# Patient Record
Sex: FEMALE | Race: WHITE | NOT HISPANIC OR LATINO | Employment: FULL TIME | URBAN - METROPOLITAN AREA
[De-identification: names, ages, dates, MRNs, and addresses within clinical notes are randomized per-mention and may not be internally consistent; named-entity substitution may affect disease eponyms.]

---

## 2017-01-23 ENCOUNTER — OFFICE VISIT (OUTPATIENT)
Dept: NEUROLOGY | Facility: MEDICAL CENTER | Age: 47
End: 2017-01-23
Payer: COMMERCIAL

## 2017-01-23 ENCOUNTER — TELEPHONE (OUTPATIENT)
Dept: NEUROLOGY | Facility: MEDICAL CENTER | Age: 47
End: 2017-01-23

## 2017-01-23 VITALS
DIASTOLIC BLOOD PRESSURE: 88 MMHG | SYSTOLIC BLOOD PRESSURE: 124 MMHG | TEMPERATURE: 98.2 F | WEIGHT: 207.8 LBS | BODY MASS INDEX: 32.62 KG/M2 | RESPIRATION RATE: 16 BRPM | HEART RATE: 96 BPM | HEIGHT: 67 IN | OXYGEN SATURATION: 97 %

## 2017-01-23 DIAGNOSIS — R56.9 SEIZURE (HCC): ICD-10-CM

## 2017-01-23 PROCEDURE — 99214 OFFICE O/P EST MOD 30 MIN: CPT | Performed by: NURSE PRACTITIONER

## 2017-01-23 RX ORDER — LAMOTRIGINE 200 MG/1
200 TABLET, EXTENDED RELEASE ORAL EVERY MORNING
Qty: 30 TAB | Refills: 5 | Status: SHIPPED | OUTPATIENT
Start: 2017-01-23 | End: 2017-07-25 | Stop reason: SDUPTHER

## 2017-01-23 RX ORDER — LAMOTRIGINE 25 MG/1
TABLET ORAL
Qty: 120 TAB | Refills: 5 | Status: SHIPPED | OUTPATIENT
Start: 2017-01-23 | End: 2018-01-24

## 2017-01-23 RX ORDER — LAMOTRIGINE 100 MG/1
100 TABLET, EXTENDED RELEASE ORAL EVERY MORNING
Qty: 30 TAB | Refills: 5 | Status: SHIPPED | OUTPATIENT
Start: 2017-01-23 | End: 2017-09-14 | Stop reason: SDUPTHER

## 2017-01-23 NOTE — PATIENT INSTRUCTIONS
Lamictal:    First transition from 400mg each night to 200mg 2 times per day X 4-5 days then 400mg each morning X 1 week.    Then reduce from 400mg each morning as follows:    Morning  375mg X 2 weeks  350mg X 2 weeks  325mg X 2 weeks  300mg thereafter    Call with an update-- Joanna [phone].

## 2017-01-23 NOTE — TELEPHONE ENCOUNTER
Pt saw Dr. Ordoñez in 2015, she is requesting a refill on Lamotrigine 200 MG tabs, per Dr. Ordoñez refill is denied as pt is no longer under provider care. I called pt left her a vm informing her of this. Either pt can make a f/v with one of our other providers or can get this refilled by her PCP. Pt called back states she has made an appt with Cici appt is not until 3/8/17 soonest she could get in. Also has left a message with her PCP. Her rx on file was verified with the pt. Will you please refill her rx ?

## 2017-01-23 NOTE — TELEPHONE ENCOUNTER
No, I'm sorry I cannot.  She can get a refill from PCP or perhaps the UC.  I can't assume the responsibility of someone I don't know.

## 2017-01-23 NOTE — TELEPHONE ENCOUNTER
Pt left voice mail this morning, regarding refill on the lamotrigine, called pt back. Left her a vm informing her that she was seen in our office in 2015. Per Dr. Ordoñez's response refill was denied, as pt is no longer under provider care. I informed the pt she can try to get this refilled by her PCP. Or she can see if she can be seen sooner by another provider in our office for this refill.

## 2017-01-23 NOTE — MR AVS SNAPSHOT
"Mir Cisneros   2017 2:00 PM   Office Visit   MRN: [id number]    Department:  [facility]   Dept Phone:  [phone]    Description:  Female : 1970   Provider:  ELODIA Awan           Reason for Visit     Follow-Up Localization-related focal epilepsy with complex partial seizures      Allergies as of 2017     No Known Allergies      You were diagnosed with     Seizure (CMS-HCC)   [391770]         Vital Signs     Blood Pressure Pulse Temperature Respirations Height Weight    124/88 mmHg 96 36.8 °C (98.2 °F) 16 1.702 m (5' 7\") 94.257 kg (207 lb 12.8 oz)    Body Mass Index Oxygen Saturation Smoking Status             32.54 kg/m2 97% Former Smoker         Basic Information     Date Of Birth Sex Race Ethnicity Preferred Language    1970 Female White Non- English      Your appointments     2017  9:20 AM   Established Patient Pul with SYLVIE Rahmna   [facility] (--)    [address]  [address]  [facility] [id number] [phone]            Mar 08, 2017 10:20 AM   Follow Up Visit with ELODIA Awan   [facility] (--)    [address], [address]  [facility] [id number] [phone]           You will be receiving a confirmation call a few days before your appointment from our automated call confirmation system.              Problem List              ICD-10-CM Priority Class Noted - Resolved    Asthma in adult J45.909   Unknown - Present    Seizure (CMS-HCC) R56.9   Unknown - Present    Heart burn R12   Unknown - Present    Disorder of thyroid E07.9   Unknown - Present    Sprain of right shoulder girdle S43.91XA   2014 - Present    Cervicalgia M54.2   2014 - Present    Right shoulder pain M25.511   2014 - Present    GAIL (obstructive sleep apnea) G47.33   10/31/2016 - Present    Insomnia G47.00   2016 - Present    BMI 32.0-32.9,adult Z68.32   2016 - Present  "      Health Maintenance        Date Due Completion Dates    IMM DTaP/Tdap/Td Vaccine (1 - Tdap) 8/3/1988 ---    IMM PNEUMOCOCCAL 19-64 (ADULT) MEDIUM RISK SERIES (1 of 1 - PPSV23) 8/3/1988 ---    PAP SMEAR 8/3/1990 ---    IMM INFLUENZA (1) 9/1/2016 4/30/2014    MAMMOGRAM 3/24/2017 3/24/2016, 4/18/2014, 10/28/2008, 10/28/2008, 6/9/2005            Current Immunizations     Influenza Vaccine Quad Inj (Preserved) 4/30/2014    Tuberculin Skin Test 5/13/2014, 4/30/2014      Below and/or attached are the medications your provider expects you to take. Review all of your home medications and newly ordered medications with your provider and/or pharmacist. Follow medication instructions as directed by your provider and/or pharmacist. Please keep your medication list with you and share with your provider. Update the information when medications are discontinued, doses are changed, or new medications (including over-the-counter products) are added; and carry medication information at all times in the event of emergency situations     Allergies:  No Known Allergies          Medications  Valid as of: January 23, 2017 -  4:17 PM    Generic Name Brand Name Tablet Size Instructions for use    Albuterol Sulfate (Aero Soln) albuterol 108 (90 BASE) MCG/ACT Inhale 2 Puffs by mouth every four hours as needed for Shortness of Breath (wheezing).        Cholecalciferol (Tab) Vitamin D (Cholecalciferol) 1000 UNITS Take  by mouth.        Coenzyme Q10   Take  by mouth every bedtime. Unsure of dose         Flaxseed (Linseed)   Take  by mouth every bedtime. Unsure of dose         LamoTRIgine (TABLET SR 24 HR) LamoTRIgine 200 MG TAKE 2 TABLETS BY MOUTH EVERY DAY        LamoTRIgine (TABLET SR 24 HR) LamoTRIgine 200 MG Take 200 mg by mouth every morning.        LamoTRIgine (TABLET SR 24 HR) LamoTRIgine 100 MG Take 100 mg by mouth every morning.        LamoTRIgine (Tab) LAMICTAL 25 MG Take 4 tablets po qam.  Taper per written physician instructions.         Levothyroxine Sodium (Tab) SYNTHROID 75 MCG Take 75 mcg by mouth every day.        Liothyronine Sodium (Tab) CYTOMEL 25 MCG Take 12.5 mcg by mouth every day. Takes 1/2 tab-12.5mcg        Multiple Vitamins-Minerals   Take  by mouth every bedtime.        Probiotic Product   Take  by mouth every day.        Zolpidem Tartrate (Tab) AMBIEN 5 MG Take 1-2 tablets by mouth every evening as needed for insomnia. Bring to sleep study.        .                 Medicines prescribed today were sent to:     [facility]/PHARMACY #[id number] - [place], [place] - [address] [place] [address]    [address]. [place] [address]    Phone: [phone] Fax: [phone]    Open 24 Hours?: No      Medication refill instructions:       If your prescription bottle indicates you have medication refills left, it is not necessary to call your provider’s office. Please contact your pharmacy and they will refill your medication.    If your prescription bottle indicates you do not have any refills left, you may request refills at any time through one of the following ways: The online [employer] system (except Urgent Care), by calling your provider’s office, or by asking your pharmacy to contact your provider’s office with a refill request. Medication refills are processed only during regular business hours and may not be available until the next business day. Your provider may request additional information or to have a follow-up visit with you prior to refilling your medication.   *Please Note: Medication refills are assigned a new Rx number when refilled electronically. Your pharmacy may indicate that no refills were authorized even though a new prescription for the same medication is available at the pharmacy. Please request the medicine by name with the pharmacy before contacting your provider for a refill.        Instructions    Lamictal:    First transition from 400mg each night to 200mg 2 times per day X 4-5 days then 400mg each morning X 1 week.    Then reduce  from 400mg each morning as follows:    Morning  375mg X 2 weeks  350mg X 2 weeks  325mg X 2 weeks  300mg thereafter    Call with an update-- Joanna [phone].          IdentityForge Access Code: Activation code not generated  Current IdentityForge Status: Active

## 2017-01-23 NOTE — PROGRESS NOTES
Subjective:      Mir Cisneros is a 46 y.o. female who presents with Follow-Up for Possible Focal Seizures.  Last evaluated by Dr. Ordoñez, September 2015.  Here today for evaluation of her treatment.        HPI   History, events began 13 years ago.  Had an event while sitting on a stool at the counter.  Felt as if there is slow dizziness and fell off the stool and could not speak.      She had subsequent events with the last occurring in 2015.  She denies any ongoing concerns for lapse of awareness, memory changes, emotional changes, etc.  She states that she has never had an awakening during the night due to one of these spells.  She has however experienced them in all positions including sitting, standing, and leaning down.    She has been maintained on lamotrigine for many years now and she is considering whether she can be tapered off of the medication or at least have her lamotrigine level reduced.    Brain MRI:  1.  Incidental right frontal venous angioma. No evidence of recent or remote hemorrhage or hemosiderin deposition.  2.  Otherwise, unremarkable MRI of the brain without and with contrast.    Routine EEG and 24 and will turn EEG have both been normal.    Current Outpatient Prescriptions   Medication Sig Dispense Refill   • albuterol (PROAIR HFA) 108 (90 BASE) MCG/ACT Aero Soln inhalation aerosol Inhale 2 Puffs by mouth every four hours as needed for Shortness of Breath (wheezing). 1 Inhaler 1   • LamoTRIgine 200 MG TABLET SR 24 HR TAKE 2 TABLETS BY MOUTH EVERY DAY 60 Tab 5   • Vitamin D, Cholecalciferol, 1000 UNITS TABS Take  by mouth.     • levothyroxine (SYNTHROID) 75 MCG TABS Take 75 mcg by mouth every day.     • liothyronine (CYTOMEL) 25 MCG TABS Take 12.5 mcg by mouth every day. Takes 1/2 tab-12.5mcg     • Probiotic Product (PROBIOTIC PO) Take  by mouth every day.     • Flaxseed, Linseed, (FLAXSEED OIL PO) Take  by mouth every bedtime. Unsure of dose      • Coenzyme Q10 (CO Q 10 PO) Take   "by mouth every bedtime. Unsure of dose      • Multiple Vitamin (MULTIVITAMIN PO) Take  by mouth every bedtime.     • zolpidem (AMBIEN) 5 MG Tab Take 1-2 tablets by mouth every evening as needed for insomnia. Bring to sleep study. 3 Tab 0     No current facility-administered medications for this visit.       Review of Systems   Constitutional: Negative.    HENT: Negative for hearing loss, nosebleeds and sore throat.         No recent head injury.   Eyes: Negative for double vision.        No new loss of vision.   Respiratory: Negative for cough.         No recent lung infections.   Cardiovascular: Negative for chest pain.   Gastrointestinal: Negative for nausea, vomiting, abdominal pain and diarrhea.   Genitourinary: Negative.    Musculoskeletal: Negative.    Skin: Negative.    Neurological: Negative for dizziness, seizures and headaches.   Endo/Heme/Allergies:        No history of endocrine dysfunction.  No new problems.   Psychiatric/Behavioral: Negative for depression. The patient is not nervous/anxious.         No recent mood changes.          Objective:     /88 mmHg  Pulse 96  Temp(Src) 36.8 °C (98.2 °F)  Resp 16  Ht 1.702 m (5' 7\")  Wt 94.257 kg (207 lb 12.8 oz)  BMI 32.54 kg/m2  SpO2 97%     Physical Exam   Constitutional: She is oriented to person, place, and time. She appears well-developed and well-nourished.   HENT:   Head: Normocephalic and atraumatic.   Nose: Nose normal.   Eyes: EOM are normal.   Neck: Normal range of motion.   Cardiovascular: Normal rate and regular rhythm.    Pulmonary/Chest: Effort normal.   Musculoskeletal: Normal range of motion.   Neurological: She is alert and oriented to person, place, and time. She exhibits normal muscle tone. Gait normal.   No observable changes in neurologic status.  See initial new patient examination for details.    Skin: Skin is warm.   Psychiatric: She has a normal mood and affect.           Assessment/Plan:     Probable localization onset " epilepsy:  Long-standing history of event suspicious for focal epilepsy.  She has been treated with lamotrigine for many years as an desires to taper the dose.    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  Routine EEG and 24 hour amateur EEG have both been normal.    Last known event suspicious for a seizure was in 2015.    Through lengthy conversation with this patient, the decided to begin to slowly taper her lamotrigine.  We will slowly taper from a current dose of 200 mg twice a day.  I first asked that she transition her dosing of 400 mg are all of the dose per day in each morning dosing.  She is struggling with her sleep and has most recently been given a CPAP which she is struggling to wear.  She does recognize that sleep deprivation is an issue for her.    Lengthy instructions for tapering of her lamotrigine are provided.  I asked that she call with any questions or concerns.    Return for follow-up in 2 months.   I spent 35 minutes with this patient, over fifty percent was spent counseling patient on their condition, best management practices, reviewing test results and risks and benefits of treatment.

## 2017-01-26 ASSESSMENT — ENCOUNTER SYMPTOMS
ABDOMINAL PAIN: 0
SEIZURES: 0
COUGH: 0
CONSTITUTIONAL NEGATIVE: 1
DOUBLE VISION: 0
HEADACHES: 0
DEPRESSION: 0
DIZZINESS: 0
NAUSEA: 0
MUSCULOSKELETAL NEGATIVE: 1
SORE THROAT: 0
VOMITING: 0
DIARRHEA: 0
NERVOUS/ANXIOUS: 0

## 2017-02-06 ENCOUNTER — OFFICE VISIT (OUTPATIENT)
Dept: PULMONOLOGY | Facility: HOSPICE | Age: 47
End: 2017-02-06
Payer: COMMERCIAL

## 2017-02-06 VITALS
HEIGHT: 67 IN | RESPIRATION RATE: 15 BRPM | BODY MASS INDEX: 31.71 KG/M2 | WEIGHT: 202 LBS | HEART RATE: 86 BPM | DIASTOLIC BLOOD PRESSURE: 82 MMHG | SYSTOLIC BLOOD PRESSURE: 120 MMHG | TEMPERATURE: 97.1 F

## 2017-02-06 DIAGNOSIS — G47.33 OSA (OBSTRUCTIVE SLEEP APNEA): ICD-10-CM

## 2017-02-06 DIAGNOSIS — J45.20 ASTHMA IN ADULT, MILD INTERMITTENT, UNCOMPLICATED: ICD-10-CM

## 2017-02-06 PROCEDURE — 99213 OFFICE O/P EST LOW 20 MIN: CPT | Performed by: NURSE PRACTITIONER

## 2017-02-06 RX ORDER — LEVOTHYROXINE SODIUM 88 MCG
TABLET ORAL
Refills: 5 | COMMUNITY
Start: 2016-12-16 | End: 2019-03-19

## 2017-02-06 NOTE — PATIENT INSTRUCTIONS
1. Continue CPAP nightly  2. Clean mask and tubing weekly  3. Continue albuterol if needed  4. Follow-up in 6 months, sooner if necessary

## 2017-02-06 NOTE — PROGRESS NOTES
"Chief Complaint   Patient presents with   • Follow-Up     Chip DL         HPI: This patient is a 46 y.o. female, who presents for follow-up of GAIL. PSG from 11/2016 indicated an AHI of 15.5, REM index of 59, low oxygen saturations of 82%. Titration study, December 10, 2016 indicates successful titration to CPAP 7 cm H2O. She was initiated on this at her last visit. Compliance download today indicates 90% compliance over the past 30 days, averages 5 hours 16 minutes per night AHI of 1.7. She has tried a couple of different masks since her last visit but has one that is very comfortable. She is using a nasal mask. She does feel that she sleeps better at night, has more energy during the day. Denies morning headaches. Overall she is happy and benefiting from therapy. She has also started taking her Lamictal in the mornings, which she feels has helped with her insomnia. She has a history of asthma. No recent PFTs. She denies URIs. Denies symptoms of dyspnea, cough or wheeze. She has proair HFA to use as needed but has not required use in some time.    Past Medical History   Diagnosis Date   • Breath shortness    • Angioma, venous    • Asthma in adult      mild   • Anesthesia      \"severe nausea\"   • Seizure (CMS-[facility])      \"simple, partial\", last seizure 01/2013   • Unspecified disorder of thyroid      hypothyroid   • Heart burn    • Pain 05-09-12     neck to left wrist, 4/10   • Pain 5/27/2014     right shoulder   • Hypothyroidism    • Chickenpox        Social History   Substance Use Topics   • Smoking status: Former Smoker -- 0.25 packs/day for 5 years     Types: Cigarettes     Quit date: 01/01/1995   • Smokeless tobacco: Never Used   • Alcohol Use: Yes      Comment: 1 per week       Family History   Problem Relation Age of Onset   • Hypertension     • Asthma Mother    • Other Mother      A Fib   • Other Father      Parkinsons       Current medications as of today   Current Outpatient Prescriptions   Medication Sig " "Dispense Refill   • SYNTHROID 88 MCG Tab TAKE 1 TABLET BY MOUTH EVERY DAY ALTERNATING DAYS WITH 75MCG  5   • LamoTRIgine (LAMICTAL XR) 200 MG TABLET SR 24 HR Take 200 mg by mouth every morning. (Patient taking differently: Take 200 mg by mouth 2 Times a Day.) 30 Tab 5   • lamotrigine (LAMICTAL) 25 MG Tab Take 4 tablets po qam.  Taper per written physician instructions. 120 Tab 5   • albuterol (PROAIR HFA) 108 (90 BASE) MCG/ACT Aero Soln inhalation aerosol Inhale 2 Puffs by mouth every four hours as needed for Shortness of Breath (wheezing). 1 Inhaler 1   • Vitamin D, Cholecalciferol, 1000 UNITS TABS Take  by mouth.     • liothyronine (CYTOMEL) 25 MCG TABS Take 12.5 mcg by mouth every day. Takes 1/2 tab-12.5mcg     • Flaxseed, Linseed, (FLAXSEED OIL PO) Take  by mouth every bedtime. Unsure of dose      • Coenzyme Q10 (CO Q 10 PO) Take  by mouth every bedtime. Unsure of dose      • Multiple Vitamin (MULTIVITAMIN PO) Take  by mouth every bedtime.     • LamoTRIgine 100 MG TABLET SR 24 HR Take 100 mg by mouth every morning. 30 Tab 5   • LamoTRIgine 200 MG TABLET SR 24 HR TAKE 2 TABLETS BY MOUTH EVERY DAY 60 Tab 5   • levothyroxine (SYNTHROID) 75 MCG TABS Take 75 mcg by mouth every day.     • Probiotic Product (PROBIOTIC PO) Take  by mouth every day.       No current facility-administered medications for this visit.       Allergies: Review of patient's allergies indicates no known allergies.    Blood pressure 120/82, pulse 86, temperature 36.2 °C (97.1 °F), temperature source Temporal, resp. rate 15, height 1.702 m (5' 7.01\"), weight 91.627 kg (202 lb).      ROS:   Constitutional: Denies fevers, chills, night sweats, weight loss or fatigue  HEENT: Denies earache, difficulty hearing, tinnitus, nasal congestion, hoarseness  Cardiovascular: Denies chest pain, tightness, palpitations, orthopnea or edema  Respiratory: Denies cough, wheeze, dyspnea, hemoptysis  Sleep: See HPI  GI: Denies heartburn, dysphagia, nausea, abdominal " pain, diarrhea or constipation  : Denies frequent urination, hematuria, discharge or painful urination  Musculoskeletal: Denies back pain, painful joints, sore muscles  Neurological: Denies weakness or headaches  Skin: No rashes    Physical exam:   Appearance: Well-nourished, well-developed, in no acute distress  HEENT: Normocephalic, atraumatic, white sclera, PERRLA  Respiratory: no intercostal retractions or accessory muscle use   Lungs auscultation: Clear to auscultation bilaterally  Cardiovascular: Regular rate rhythm no murmurs, rubs or gallops  Gait: Normal  Digits: No clubbing, cyanosis  Motor: No focal deficits  Orientation: Oriented to time, person and place    Diagnosis:  1. BMI 32.0-32.9,adult     2. Asthma in adult, mild intermittent, uncomplicated     3. GAIL (obstructive sleep apnea)         Plan:    1. Continue CPAP nightly  2. Clean mask and tubing weekly  3. Continue albuterol if needed  4. Follow-up in 6 months, sooner if necessary

## 2017-02-06 NOTE — MR AVS SNAPSHOT
"Mir Naik Mayfloreskaterina   2017 9:20 AM   Office Visit   MRN: [id number]    Department:  [facility]   Dept Phone:  [phone]    Description:  Female : 1970   Provider:  SYLVIE Rahman           Reason for Visit     Follow-Up Chip DL      Allergies as of 2017     No Known Allergies      You were diagnosed with     BMI 32.0-32.9,adult   [762938]       Asthma in adult, mild intermittent, uncomplicated   [6204398]       GALI (obstructive sleep apnea)   [609111]         Vital Signs     Blood Pressure Pulse Temperature Respirations Height Weight    120/82 mmHg 86 36.2 °C (97.1 °F) (Temporal) 15 1.702 m (5' 7.01\") 91.627 kg (202 lb)    Body Mass Index Smoking Status                31.63 kg/m2 Former Smoker          Basic Information     Date Of Birth Sex Race Ethnicity Preferred Language    1970 Female White Non- English      Your appointments     Mar 08, 2017 10:20 AM   Follow Up Visit with ELODIA Awan   [facility] (--)    [address], [address]  [facility] [address] [phone]           You will be receiving a confirmation call a few days before your appointment from our automated call confirmation system.              Problem List              ICD-10-CM Priority Class Noted - Resolved    Asthma in adult J45.909   Unknown - Present    Seizure (CMS-HCC) R56.9   Unknown - Present    Heart burn R12   Unknown - Present    Disorder of thyroid E07.9   Unknown - Present    Sprain of right shoulder girdle S43.91XA   2014 - Present    Cervicalgia M54.2   2014 - Present    Right shoulder pain M25.511   2014 - Present    GAIL (obstructive sleep apnea) G47.33   10/31/2016 - Present    Insomnia G47.00   2016 - Present    BMI 32.0-32.9,adult Z68.32   2016 - Present      Health Maintenance        Date Due Completion Dates    IMM DTaP/Tdap/Td Vaccine (1 - Tdap) 8/3/1988 ---    IMM PNEUMOCOCCAL 19-64 (ADULT) MEDIUM " RISK SERIES (1 of 1 - PPSV23) 8/3/1988 ---    PAP SMEAR 8/3/1990 ---    IMM INFLUENZA (1) 9/1/2016 4/30/2014    MAMMOGRAM 3/24/2017 3/24/2016, 4/18/2014, 10/28/2008, 10/28/2008, 6/9/2005            Current Immunizations     Influenza Vaccine Quad Inj (Preserved) 4/30/2014    Tuberculin Skin Test 5/13/2014, 4/30/2014      Below and/or attached are the medications your provider expects you to take. Review all of your home medications and newly ordered medications with your provider and/or pharmacist. Follow medication instructions as directed by your provider and/or pharmacist. Please keep your medication list with you and share with your provider. Update the information when medications are discontinued, doses are changed, or new medications (including over-the-counter products) are added; and carry medication information at all times in the event of emergency situations     Allergies:  No Known Allergies          Medications  Valid as of: February 06, 2017 -  9:36 AM    Generic Name Brand Name Tablet Size Instructions for use    Albuterol Sulfate (Aero Soln) albuterol 108 (90 BASE) MCG/ACT Inhale 2 Puffs by mouth every four hours as needed for Shortness of Breath (wheezing).        Cholecalciferol (Tab) Vitamin D (Cholecalciferol) 1000 UNITS Take  by mouth.        Coenzyme Q10   Take  by mouth every bedtime. Unsure of dose         Flaxseed (Linseed)   Take  by mouth every bedtime. Unsure of dose         LamoTRIgine (TABLET SR 24 HR) LamoTRIgine 200 MG TAKE 2 TABLETS BY MOUTH EVERY DAY        LamoTRIgine (TABLET SR 24 HR) LamoTRIgine 200 MG Take 200 mg by mouth every morning.        LamoTRIgine (TABLET SR 24 HR) LamoTRIgine 100 MG Take 100 mg by mouth every morning.        LamoTRIgine (Tab) LAMICTAL 25 MG Take 4 tablets po qam.  Taper per written physician instructions.        Levothyroxine Sodium (Tab) SYNTHROID 75 MCG Take 75 mcg by mouth every day.        Levothyroxine Sodium (Tab) SYNTHROID 88 MCG TAKE 1 TABLET  BY MOUTH EVERY DAY ALTERNATING DAYS WITH 75MCG        Liothyronine Sodium (Tab) CYTOMEL 25 MCG Take 12.5 mcg by mouth every day. Takes 1/2 tab-12.5mcg        Multiple Vitamins-Minerals   Take  by mouth every bedtime.        Probiotic Product   Take  by mouth every day.        .                 Medicines prescribed today were sent to:     [facility]/PHARMACY #[id number] - [place], [place] - [address]    [address] [address] [place] [address]    Phone: [phone] Fax: [phone]    Open 24 Hours?: No      Medication refill instructions:       If your prescription bottle indicates you have medication refills left, it is not necessary to call your provider’s office. Please contact your pharmacy and they will refill your medication.    If your prescription bottle indicates you do not have any refills left, you may request refills at any time through one of the following ways: The online Friendsignia system (except Urgent Care), by calling your provider’s office, or by asking your pharmacy to contact your provider’s office with a refill request. Medication refills are processed only during regular business hours and may not be available until the next business day. Your provider may request additional information or to have a follow-up visit with you prior to refilling your medication.   *Please Note: Medication refills are assigned a new Rx number when refilled electronically. Your pharmacy may indicate that no refills were authorized even though a new prescription for the same medication is available at the pharmacy. Please request the medicine by name with the pharmacy before contacting your provider for a refill.        Instructions    1. Continue CPAP nightly  2. Clean mask and tubing weekly  3. Continue albuterol if needed  4. Follow-up in 6 months, sooner if necessary          Friendsignia Access Code: Activation code not generated  Current Friends[employer]ia Status: Active

## 2017-04-14 ENCOUNTER — HOSPITAL ENCOUNTER (OUTPATIENT)
Dept: LAB | Facility: MEDICAL CENTER | Age: 47
End: 2017-04-14
Attending: FAMILY MEDICINE
Payer: COMMERCIAL

## 2017-04-14 LAB
T3FREE SERPL-MCNC: 3.21 PG/ML (ref 2.4–4.2)
T4 SERPL-MCNC: 7.8 UG/DL (ref 4–12)
TSH SERPL DL<=0.005 MIU/L-ACNC: 4.53 UIU/ML (ref 0.3–3.7)

## 2017-04-14 PROCEDURE — 84443 ASSAY THYROID STIM HORMONE: CPT

## 2017-04-14 PROCEDURE — 84436 ASSAY OF TOTAL THYROXINE: CPT

## 2017-04-14 PROCEDURE — 84481 FREE ASSAY (FT-3): CPT

## 2017-04-14 PROCEDURE — 36415 COLL VENOUS BLD VENIPUNCTURE: CPT

## 2017-08-02 ENCOUNTER — HOSPITAL ENCOUNTER (OUTPATIENT)
Dept: LAB | Facility: MEDICAL CENTER | Age: 47
End: 2017-08-02
Attending: FAMILY MEDICINE
Payer: COMMERCIAL

## 2017-08-02 LAB — TSH SERPL DL<=0.005 MIU/L-ACNC: 1.59 UIU/ML (ref 0.3–3.7)

## 2017-08-02 PROCEDURE — 84443 ASSAY THYROID STIM HORMONE: CPT

## 2017-08-02 PROCEDURE — 36415 COLL VENOUS BLD VENIPUNCTURE: CPT

## 2017-08-08 ENCOUNTER — RX ONLY (OUTPATIENT)
Age: 47
Setting detail: RX ONLY
End: 2017-08-08

## 2017-09-18 ENCOUNTER — TELEPHONE (OUTPATIENT)
Dept: NEUROLOGY | Facility: MEDICAL CENTER | Age: 47
End: 2017-09-18

## 2017-09-25 DIAGNOSIS — G40.219 PARTIAL SYMPTOMATIC EPILEPSY WITH COMPLEX PARTIAL SEIZURES, INTRACTABLE, WITHOUT STATUS EPILEPTICUS (HCC): ICD-10-CM

## 2017-09-25 DIAGNOSIS — R56.9 SEIZURE (HCC): ICD-10-CM

## 2017-09-25 NOTE — TELEPHONE ENCOUNTER
Patient phoned today asking if it would be possible to have a script for the 300mg tablets instead of 1 for the 100mg and 1 for the 200mg tablets. Patient states that her pharmacist said that the 300mg would be a better option for her. Thanks    Was the patient seen in the last year in this department? Yes  1/23/17    Does patient have an active prescription for medications requested? Yes     Received Request Via: Pharmacy     Next scheduled follow up appointment: 1/2/18

## 2017-09-26 DIAGNOSIS — R56.9 SEIZURE (HCC): ICD-10-CM

## 2017-09-26 RX ORDER — LAMOTRIGINE 100 MG/1
1 TABLET, EXTENDED RELEASE ORAL
Qty: 30 TAB | Refills: 3 | Status: CANCELLED | OUTPATIENT
Start: 2017-09-26

## 2017-09-26 RX ORDER — LAMOTRIGINE 300 MG/1
1 TABLET, EXTENDED RELEASE ORAL EVERY MORNING
Qty: 30 TAB | Refills: 3 | Status: SHIPPED | OUTPATIENT
Start: 2017-09-26 | End: 2019-03-19

## 2017-09-26 RX ORDER — LAMOTRIGINE 200 MG/1
2 TABLET, EXTENDED RELEASE ORAL
Qty: 60 TAB | Refills: 5 | OUTPATIENT
Start: 2017-09-26

## 2018-01-24 ENCOUNTER — OFFICE VISIT (OUTPATIENT)
Dept: NEUROLOGY | Facility: MEDICAL CENTER | Age: 48
End: 2018-01-24
Payer: COMMERCIAL

## 2018-01-24 VITALS
DIASTOLIC BLOOD PRESSURE: 76 MMHG | TEMPERATURE: 97.2 F | WEIGHT: 209 LBS | SYSTOLIC BLOOD PRESSURE: 122 MMHG | BODY MASS INDEX: 32.8 KG/M2 | RESPIRATION RATE: 18 BRPM | HEART RATE: 84 BPM | HEIGHT: 67 IN | OXYGEN SATURATION: 96 %

## 2018-01-24 DIAGNOSIS — R56.9 SEIZURE (HCC): ICD-10-CM

## 2018-01-24 PROCEDURE — 99214 OFFICE O/P EST MOD 30 MIN: CPT | Performed by: NURSE PRACTITIONER

## 2018-01-24 RX ORDER — LAMOTRIGINE 200 MG/1
200 TABLET, EXTENDED RELEASE ORAL EVERY MORNING
Qty: 30 TAB | Refills: 11 | Status: SHIPPED | OUTPATIENT
Start: 2018-01-24 | End: 2019-02-10 | Stop reason: SDUPTHER

## 2018-01-24 RX ORDER — LAMOTRIGINE 25 MG/1
100 TABLET ORAL DAILY
Qty: 120 TAB | Refills: 2 | Status: SHIPPED | OUTPATIENT
Start: 2018-01-24 | End: 2020-03-11

## 2018-01-24 ASSESSMENT — PATIENT HEALTH QUESTIONNAIRE - PHQ9: CLINICAL INTERPRETATION OF PHQ2 SCORE: 0

## 2018-01-24 NOTE — PROGRESS NOTES
Subjective:      Mir Cisneros is a 47 y.o. female who presents with Follow-Up (Seizure (CMS-HCC))            HPI Here for a medication refill.    History, events began 14 years ago.  Had an event while sitting on a stool at the counter.  Felt as if there is slow dizziness and fell off the stool and could not speak.       She had subsequent events with the last occurring in 2015.  She denies any ongoing concerns for lapse of awareness, memory changes, emotional changes, etc.  She states that she has never had an awakening during the night due to one of these spells.  She has however experienced them in all positions including sitting, standing, and leaning down.     She has been maintained on lamotrigine for many years now and she is considering whether she can continue to taper off of the medication or at least have her lamotrigine level reduced.     Now using CPAP which was greatly improved her health.    Current Outpatient Prescriptions   Medication Sig Dispense Refill   • LamoTRIgine 300 MG TABLET SR 24 HR Take 1 Tab by mouth every morning. 30 Tab 3   • SYNTHROID 88 MCG Tab TAKE 1 TABLET BY MOUTH EVERY DAY ALTERNATING DAYS WITH 75MCG  5   • albuterol (PROAIR HFA) 108 (90 BASE) MCG/ACT Aero Soln inhalation aerosol Inhale 2 Puffs by mouth every four hours as needed for Shortness of Breath (wheezing). 1 Inhaler 1   • Vitamin D, Cholecalciferol, 1000 UNITS TABS Take  by mouth.     • levothyroxine (SYNTHROID) 75 MCG TABS Take 75 mcg by mouth every day.     • liothyronine (CYTOMEL) 25 MCG TABS Take 12.5 mcg by mouth every day. Takes 1/2 tab-12.5mcg     • Probiotic Product (PROBIOTIC PO) Take  by mouth every day.     • Flaxseed, Linseed, (FLAXSEED OIL PO) Take  by mouth every bedtime. Unsure of dose      • Coenzyme Q10 (CO Q 10 PO) Take  by mouth every bedtime. Unsure of dose      • Multiple Vitamin (MULTIVITAMIN PO) Take  by mouth every bedtime.       No current facility-administered medications for this  "visit.        Review of Systems   Constitutional: Negative.    HENT: Negative for hearing loss, nosebleeds and sore throat.         No recent head injury.   Eyes: Negative for double vision.        No new loss of vision.   Respiratory: Negative for cough.         No recent lung infections.   Cardiovascular: Negative for chest pain.   Gastrointestinal: Negative for abdominal pain, diarrhea, nausea and vomiting.   Genitourinary: Negative.    Musculoskeletal: Negative.    Skin: Negative.    Neurological: Negative for seizures and headaches.   Endo/Heme/Allergies:        No history of endocrine dysfunction.  No new problems.   Psychiatric/Behavioral: Negative for depression. The patient is not nervous/anxious.         No recent mood changes.          Objective:     /76   Pulse 84   Temp 36.2 °C (97.2 °F)   Resp 18   Ht 1.702 m (5' 7\")   Wt 94.8 kg (209 lb)   SpO2 96%   BMI 32.73 kg/m²      Physical Exam   Constitutional: She is oriented to person, place, and time. She appears well-developed and well-nourished. No distress.   HENT:   Head: Normocephalic and atraumatic.   Nose: Nose normal.   Eyes: EOM are normal.   Neck: Normal range of motion.   Cardiovascular: Normal rate and regular rhythm.    Pulmonary/Chest: Effort normal.   Neurological: She is alert and oriented to person, place, and time. She exhibits normal muscle tone. Gait normal.   No observable changes in neurologic status.  See initial new patient examination for details.    Skin: Skin is warm.   Psychiatric: She has a normal mood and affect.               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 She has been treated with lamotrigine for many years and desires to taper the dose.     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  Routine EEG and 24 hour aEEG have both been normal.     Last known event suspicious for a seizure was " in 2015.     Through lengthy conversation with this patient, we decide to continue to taper her lamotrigine.  We will slowly taper from a current dose of LTG ER 300mg qam to 200mg qam.   Lengthy instructions for tapering of her lamotrigine are provided.  I asked that she call with any questions or concerns.     Return for follow-up in 6 months.   I spent 35 minutes with this patient, over fifty percent was spent counseling patient on their condition, best management practices, reviewing test results and risks and benefits of treatment.      EDUCATION AND COUNSELING:  -Education was provided to the patient and/or family regarding diagnosis and prognosis. The chronic and unpredictable nature of the condition was discussed. There is increased risk for additional events, which may carry potential for significant injuries and death.    -We reviewed the current antiepileptic regimen. Potential side effects of antiepileptics were discussed at length, including but no limited to: hypersensitivity reactions (rash and others, some of which can be fatal), visual field changes (some of which may be irreversible), glaucoma, diplopia, kidney stones, osteopenia/osteoporosis/bone fractures, hyperthermia/anhydrosis, tremors, ataxia, dizziness, fatigue, increased risk for falls, cardiac arrhythmias/syncope, gastrointestinal (hepatitis, pancreatitis, gastritis, ulcers), gingival hypertrophy, drowsiness, sedation, anxiety/nervousness, increased risk for suicide, increased risk for depression, and psychosis. We reviewed drug-drug interactions and their potential effect on seizure control and medication side effects.    -Patient/family educated on SUDEP (Sudden Death in Epilepsy). Counseling was provided on the importance of strict medication and follow up compliance. The patient understands the risks associated with non-adherence with the medical plan as outlined, including but not limited to an increased risk for breakthrough seizures,  which may contribute to injuries, disability, status epilepticus, and even death.    -Counseling was also provided on potential effects of alcohol and other drugs, which may lower seizure threshold and/or affect the metabolism of antiepileptic drugs. I recommend avoidance of alcohol and illegal drugs.  -Recommend proper hydration, regular exercise, proper sleep hygiene (7-8 hrs of overnight sleep, avoid sleep deprivation).    -I have made the patient aware of mandatory reporting required by the law in the [facility] regarding episodes of seizures, loss of consciousness, and/or alteration of awareness. The patient and family are responsible for reporting events to the DMV, instructions were provided. The patient verbalized understanding and states she has been driving. Other seizure precautions were discussed at length, including no diving, no skydiving, no unsupervised swimming, no Jacuzzi or bathing in bathtubs.        Pt agrees with plan.

## 2018-01-25 ASSESSMENT — ENCOUNTER SYMPTOMS
SEIZURES: 0
NAUSEA: 0
DEPRESSION: 0
VOMITING: 0
CONSTITUTIONAL NEGATIVE: 1
DOUBLE VISION: 0
NERVOUS/ANXIOUS: 0
DIARRHEA: 0
HEADACHES: 0
ABDOMINAL PAIN: 0
SORE THROAT: 0
MUSCULOSKELETAL NEGATIVE: 1
COUGH: 0

## 2018-01-28 ENCOUNTER — OFFICE VISIT (OUTPATIENT)
Dept: URGENT CARE | Facility: PHYSICIAN GROUP | Age: 48
End: 2018-01-28
Payer: COMMERCIAL

## 2018-01-28 VITALS
OXYGEN SATURATION: 99 % | DIASTOLIC BLOOD PRESSURE: 92 MMHG | HEIGHT: 67 IN | SYSTOLIC BLOOD PRESSURE: 130 MMHG | WEIGHT: 209 LBS | HEART RATE: 83 BPM | RESPIRATION RATE: 16 BRPM | TEMPERATURE: 98.6 F | BODY MASS INDEX: 32.8 KG/M2

## 2018-01-28 DIAGNOSIS — M79.10 MYALGIA: ICD-10-CM

## 2018-01-28 DIAGNOSIS — J11.1 INFLUENZA: Primary | ICD-10-CM

## 2018-01-28 PROCEDURE — 99214 OFFICE O/P EST MOD 30 MIN: CPT | Performed by: PHYSICIAN ASSISTANT

## 2018-01-28 RX ORDER — OSELTAMIVIR PHOSPHATE 75 MG/1
75 CAPSULE ORAL 2 TIMES DAILY
Qty: 10 CAP | Refills: 0 | Status: SHIPPED | OUTPATIENT
Start: 2018-01-28 | End: 2018-02-02

## 2018-01-28 ASSESSMENT — ENCOUNTER SYMPTOMS
BACK PAIN: 1
EYES NEGATIVE: 1
PSYCHIATRIC NEGATIVE: 1
MYALGIAS: 1
CHILLS: 1
GASTROINTESTINAL NEGATIVE: 1
CARDIOVASCULAR NEGATIVE: 1
FEVER: 1
SORE THROAT: 1
COUGH: 1
HEADACHES: 1

## 2018-01-28 NOTE — PATIENT INSTRUCTIONS
"Flonase and nasal saline irrigation (netti pot or Zeferino Med Sinus Rinse).  Used distilled water or boiled tap water with nasal flushes, not straight tap water.  Humidifier at bedtime.  Hot steam showers to loosen up mucous.  Cough medicine at bedtime. Delsym.  Lots of fluids, tea with honey.  Ibuprofen for headache, fever, chills.  Be sure to take with food.  Return if worsening: Yellow thicker mucus changes, worsening pain around the eyes and radiates to the teeth, and fever over 101°F.        Influenza, Adult  Influenza (\"the flu\") is a viral infection of the respiratory tract. It occurs more often in winter months because people spend more time in close contact with one another. Influenza can make you feel very sick. Influenza easily spreads from person to person (contagious).  CAUSES   Influenza is caused by a virus that infects the respiratory tract. You can catch the virus by breathing in droplets from an infected person's cough or sneeze. You can also catch the virus by touching something that was recently contaminated with the virus and then touching your mouth, nose, or eyes.  RISKS AND COMPLICATIONS  You may be at risk for a more severe case of influenza if you smoke cigarettes, have diabetes, have chronic heart disease (such as heart failure) or lung disease (such as asthma), or if you have a weakened immune system. Elderly people and pregnant women are also at risk for more serious infections. The most common problem of influenza is a lung infection (pneumonia). Sometimes, this problem can require emergency medical care and may be life threatening.  SIGNS AND SYMPTOMS   Symptoms typically last 4 to 10 days and may include:  · Fever.  · Chills.  · Headache, body aches, and muscle aches.  · Sore throat.  · Chest discomfort and cough.  · Poor appetite.  · Weakness or feeling tired.  · Dizziness.  · Nausea or vomiting.  DIAGNOSIS   Diagnosis of influenza is often made based on your history and a physical " exam. A nose or throat swab test can be done to confirm the diagnosis.  TREATMENT   In mild cases, influenza goes away on its own. Treatment is directed at relieving symptoms. For more severe cases, your health care provider may prescribe antiviral medicines to shorten the sickness. Antibiotic medicines are not effective because the infection is caused by a virus, not by bacteria.  HOME CARE INSTRUCTIONS  · Take medicines only as directed by your health care provider.  · Use a cool mist humidifier to make breathing easier.  · Get plenty of rest until your temperature returns to normal. This usually takes 3 to 4 days.  · Drink enough fluid to keep your urine clear or pale yellow.  · Cover your mouth and nose when coughing or sneezing, and wash your hands well to prevent the virus from spreading.  · Stay home from work or school until the fever is gone for at least 1 full day.  PREVENTION   An annual influenza vaccination (flu shot) is the best way to avoid getting influenza. An annual flu shot is now routinely recommended for all adults in the [place]  SEEK MEDICAL CARE IF:  · You experience chest pain, your cough worsens, or you produce more mucus.  · You have nausea, vomiting, or diarrhea.  · Your fever returns or gets worse.  SEEK IMMEDIATE MEDICAL CARE IF:  · You have trouble breathing, you become short of breath, or your skin or nails become bluish.  · You have severe pain or stiffness in the neck.  · You develop a sudden headache, or pain in the face or ear.  · You have nausea or vomiting that you cannot control.  MAKE SURE YOU:   · Understand these instructions.  · Will watch your condition.  · Will get help right away if you are not doing well or get worse.     This information is not intended to replace advice given to you by your health care provider. Make sure you discuss any questions you have with your health care provider.     Document Released: 12/15/2001 Document Revised: 01/08/2016 Document Reviewed:  03/18/2013  Elsevier Interactive Patient Education ©2016 [employer]

## 2018-01-29 NOTE — PROGRESS NOTES
"Subjective:      Mir Cisneros is a 47 y.o. female who presents with Generalized Body Aches (body aches & fatigue x1 day)            HPI  Chief Complaint   Patient presents with   • Generalized Body Aches     body aches & fatigue x1 day       HPI:  Mir Cisneros is a 47 y.o. Female who presents with daughter with onset of body aches fatigue and subjective fever and chills since this morning. Did not take temperature but did take [place] 2 times today. Worsening shoulder pain and back pain. Slight sore throat and runny nose and cough. Did not get a flu vaccine. Concerned about influenza. Headache has worsened. No history of pneumonia. Does have history of asthma with an inhaler. Some chest tightness.  Patient denies SOB, chest pain, palpitations, or n/v/d.      Past Medical History:   Diagnosis Date   • Anesthesia     \"severe nausea\"   • Angioma, venous    • Asthma in adult     mild   • Breath shortness    • Chickenpox    • Heart burn    • Hypothyroidism    • Pain 05-09-12    neck to left wrist, 4/10   • Pain 5/27/2014    right shoulder   • Seizure (CMS-[facility])     \"simple, partial\", last seizure 01/2013   • Unspecified disorder of thyroid     hypothyroid       Past Surgical History:   Procedure Laterality Date   • SHOULDER DECOMPRESSION ARTHROSCOPIC  5/29/2014    Performed by Mendel Guzman M.D. at SURGERY [facility]   • CLAVICLE DISTAL EXCISION  5/29/2014    Performed by Mendel Guzman M.D. at [facility]   • SHOULDER ARTHROSCOPY W/ ROTATOR CUFF REPAIR  5/29/2014    Performed by Mendel Guzman M.D. at [facility]   • NERVE ULNAR TRANSFER  5/11/2012    Performed by ROXY HAMILTON at SURGERY SAME DAY [facility]   • EPICONDYLAR STRIPPING  5/11/2012    Performed by ROXY HAMILTON at SURGERY SAME DAY [facility]   • DILATION AND CURETTAGE  12/2005    [facility]   • PRIMARY C SECTION  2000   • WRIST ARTHROSCOPY  1997    left wrist reconstruction   • SHOULDER " ARTHROSCOPY  1993    left acromioplasty       Family History   Problem Relation Age of Onset   • Asthma Mother    • Other Mother      A Fib   • Other Father      Parkinsons   • Hypertension       No pertinent family history.    Social History     Social History   • Marital status:      Spouse name: N/A   • Number of children: N/A   • Years of education: N/A     Occupational History   • Not on file.     Social History Main Topics   • Smoking status: Former Smoker     Packs/day: 0.25     Years: 5.00     Types: Cigarettes     Quit date: 1/1/1995   • Smokeless tobacco: Never Used   • Alcohol use Yes      Comment: 1 per week   • Drug use: No   • Sexual activity: Not on file     Other Topics Concern   • Not on file     Social History Narrative   • No narrative on file         Current Outpatient Prescriptions:   •  oseltamivir, 75 mg, Oral, BID  •  LamoTRIgine, 200 mg, Oral, QAM, 1/28/2018  •  SYNTHROID, TAKE 1 TABLET BY MOUTH EVERY DAY ALTERNATING DAYS WITH 75MCG, 1/28/2018  •  Vitamin D (Cholecalciferol), Take  by mouth., 1/28/2018  •  liothyronine, 12.5 mcg, Oral, DAILY, 1/28/2018  •  Probiotic Product (PROBIOTIC PO), Take  by mouth every day., 1/28/2018  •  Flaxseed, Linseed, (FLAXSEED OIL PO), Take  by mouth every bedtime. Unsure of dose , 1/28/2018  •  Coenzyme Q10 (CO Q 10 PO), Take  by mouth every bedtime. Unsure of dose , 1/28/2018  •  Multiple Vitamin (MULTIVITAMIN PO), Take  by mouth every bedtime., 1/28/2018  •  lamotrigine, 100 mg, Oral, DAILY  •  LamoTRIgine, 1 Tab, Oral, QAM, 1/24/2018  •  albuterol, 2 Puff, Inhalation, Q4HRS PRN, 1/24/2018  •  levothyroxine, 75 mcg, Oral, DAILY    No Known Allergies      Review of Systems   Constitutional: Positive for chills, fever and malaise/fatigue.   HENT: Positive for congestion and sore throat.    Eyes: Negative.    Respiratory: Positive for cough.    Cardiovascular: Negative.    Gastrointestinal: Negative.    Genitourinary: Negative.    Musculoskeletal:  "Positive for back pain, joint pain and myalgias.   Skin: Negative.    Neurological: Positive for headaches.   Endo/Heme/Allergies: Negative.    Psychiatric/Behavioral: Negative.           Objective:     /92   Pulse 83   Temp 37 °C (98.6 °F)   Resp 16   Ht 1.702 m (5' 7\")   Wt 94.8 kg (209 lb)   SpO2 99%   BMI 32.73 kg/m²      Physical Exam       Nursing note and vital signs reviewed.    Constitutional:  Appropriately groomed, pleasant affect, well nourished, and in no acute distress.    HEENT:  Head: Atruamatic, normocephalic.    Ears:  EAC with mild cerumen bilaterally, not erythematous.  TM’s pearly gray with cone of light present and umbo and malleolus visible bilaterally.  No bulging or fluid bubbles present in middle ear.    Eyes:  PERRLA, EOM's full, sclera white, conjunctiva not erythematous, and medial canthus without exudate bilaterally.    Nose:  Nares patent bilaterally.  Nasal mucosa edematous with clear rhinorrhea bilaterally.  Frontal and maxillary sinuses not tender to percussion.    Throat:  Oropharynx erythematous, with no enlargement of the palatine tonsils bilaterally with no exudates.    Posterior oropharynx with cobblestoning and clear to white post nasal drainage present.  Soft palate rises symmetrically bilaterally and uvula midline.  Neck supple, with mild proximal anterior cervical chain lymphadenopathy that is soft and mobile to palpation.      Lungs: Respiratory effort within normal limits. Lungs clear to auscultation bilaterally. No crackles or rhonchi noted.     Heart:  Regular rate and rhythm without rubs, murmurs, or gallops. Dorsalis pedis and radial pulses 2+ bilaterally. No lower extremity edema.    Muscle skeletal:  Gait and station wnl, non antalgic.    Derm:  No lesions or rashes. Overall good turgor pressure.     Psychiatric:  Normal judgement, mood and affect.       Assessment/Plan:     1. Influenza  oseltamivir (TAMIFLU) 75 MG Cap   2. Myalgia  oseltamivir " (TAMIFLU) 75 MG Cap    CANCELED: POCT Influenza A/B    Patient presents with flulike symptoms with onset this morning. Has been taking [place] plus. History of asthma. On exam patient does have coryza and an erythematous oropharynx. Lungs clear to auscultation bilaterally. Did discuss obtaining flu swab the patient deferred at this time. Plan to treat with Tamiflu based on symptoms. Suspect there worsen and recent dosage of alkeseltzer explains patient's afebrile status. Patient is flushed.. Prescribed Tamiflu and recommended pushing fluids and nasal saline irrigation.  \  Patient was in agreement with this treatment plan and seemed to understand without barriers. All questions were encouraged and answered.  Reviewed signs and symptoms of when to seek emergency medical care.     Please note that this dictation was created using voice recognition software.  I have made every reasonable attempt to correct obvious errors, but I expect there are errors of krista and possibly content that I did not discover before finalizing the note.

## 2018-02-09 NOTE — TELEPHONE ENCOUNTER
Patient called stating that her pharmacist told her that lamictal 25mg was available in Extended Release. Patient is wondering if she could have a script of the extended Release instead??    Was the patient seen in the last year in this department? Yes  1/24/18    Does patient have an active prescription for medications requested? Yes     Received Request Via: Patient    No follow up appointment scheduled at this time.

## 2018-02-13 DIAGNOSIS — R56.9 SEIZURE (HCC): ICD-10-CM

## 2018-02-13 RX ORDER — LAMOTRIGINE 25 MG/1
4 TABLET, EXTENDED RELEASE ORAL DAILY
Qty: 120 TAB | Refills: 5 | Status: SHIPPED | OUTPATIENT
Start: 2018-02-13 | End: 2018-08-03 | Stop reason: SDUPTHER

## 2018-02-13 RX ORDER — LAMOTRIGINE 25 MG/1
100 TABLET ORAL DAILY
Qty: 120 TAB | Refills: 2 | OUTPATIENT
Start: 2018-02-13

## 2018-02-20 DIAGNOSIS — J45.20 ASTHMA IN ADULT, MILD INTERMITTENT, UNCOMPLICATED: ICD-10-CM

## 2018-02-20 NOTE — TELEPHONE ENCOUNTER
Have we ever prescribed this med? Yes.  If yes, what date? 12/16/16    Last OV: 2/6/17 6mth    Next OV: No pending apt scheduled    DX: Asthma in adult, mild intermittent, uncomplicated (J45.20)    Medications: albuterol (PROAIR HFA) 108 (90 BASE) MCG/ACT Aero Soln inhalation aerosol

## 2018-05-09 ENCOUNTER — HOSPITAL ENCOUNTER (OUTPATIENT)
Dept: RADIOLOGY | Facility: MEDICAL CENTER | Age: 48
End: 2018-05-09
Attending: FAMILY MEDICINE
Payer: COMMERCIAL

## 2018-05-09 DIAGNOSIS — Z12.31 VISIT FOR SCREENING MAMMOGRAM: ICD-10-CM

## 2018-05-09 PROCEDURE — 77067 SCR MAMMO BI INCL CAD: CPT

## 2018-05-19 ENCOUNTER — HOSPITAL ENCOUNTER (OUTPATIENT)
Dept: LAB | Facility: MEDICAL CENTER | Age: 48
End: 2018-05-19
Attending: FAMILY MEDICINE
Payer: COMMERCIAL

## 2018-05-19 LAB
ALBUMIN SERPL BCP-MCNC: 4.2 G/DL (ref 3.2–4.9)
ALBUMIN/GLOB SERPL: 1.7 G/DL
ALP SERPL-CCNC: 70 U/L (ref 30–99)
ALT SERPL-CCNC: 12 U/L (ref 2–50)
ANION GAP SERPL CALC-SCNC: 9 MMOL/L (ref 0–11.9)
AST SERPL-CCNC: 13 U/L (ref 12–45)
BASOPHILS # BLD AUTO: 0.7 % (ref 0–1.8)
BASOPHILS # BLD: 0.07 K/UL (ref 0–0.12)
BILIRUB SERPL-MCNC: 0.9 MG/DL (ref 0.1–1.5)
BUN SERPL-MCNC: 8 MG/DL (ref 8–22)
CALCIUM SERPL-MCNC: 9 MG/DL (ref 8.5–10.5)
CHLORIDE SERPL-SCNC: 106 MMOL/L (ref 96–112)
CHOLEST SERPL-MCNC: 172 MG/DL (ref 100–199)
CO2 SERPL-SCNC: 24 MMOL/L (ref 20–33)
CREAT SERPL-MCNC: 0.85 MG/DL (ref 0.5–1.4)
EOSINOPHIL # BLD AUTO: 0.09 K/UL (ref 0–0.51)
EOSINOPHIL NFR BLD: 0.9 % (ref 0–6.9)
ERYTHROCYTE [DISTWIDTH] IN BLOOD BY AUTOMATED COUNT: 41.7 FL (ref 35.9–50)
GLOBULIN SER CALC-MCNC: 2.5 G/DL (ref 1.9–3.5)
GLUCOSE SERPL-MCNC: 92 MG/DL (ref 65–99)
HCT VFR BLD AUTO: 48.2 % (ref 37–47)
HDLC SERPL-MCNC: 62 MG/DL
HGB BLD-MCNC: 15.5 G/DL (ref 12–16)
IMM GRANULOCYTES # BLD AUTO: 0.05 K/UL (ref 0–0.11)
IMM GRANULOCYTES NFR BLD AUTO: 0.5 % (ref 0–0.9)
LDLC SERPL CALC-MCNC: 83 MG/DL
LYMPHOCYTES # BLD AUTO: 2.62 K/UL (ref 1–4.8)
LYMPHOCYTES NFR BLD: 27 % (ref 22–41)
MCH RBC QN AUTO: 28.8 PG (ref 27–33)
MCHC RBC AUTO-ENTMCNC: 32.2 G/DL (ref 33.6–35)
MCV RBC AUTO: 89.4 FL (ref 81.4–97.8)
MONOCYTES # BLD AUTO: 0.71 K/UL (ref 0–0.85)
MONOCYTES NFR BLD AUTO: 7.3 % (ref 0–13.4)
NEUTROPHILS # BLD AUTO: 6.15 K/UL (ref 2–7.15)
NEUTROPHILS NFR BLD: 63.6 % (ref 44–72)
NRBC # BLD AUTO: 0 K/UL
NRBC BLD-RTO: 0 /100 WBC
PLATELET # BLD AUTO: 311 K/UL (ref 164–446)
PMV BLD AUTO: 10.7 FL (ref 9–12.9)
POTASSIUM SERPL-SCNC: 4.5 MMOL/L (ref 3.6–5.5)
PROT SERPL-MCNC: 6.7 G/DL (ref 6–8.2)
RBC # BLD AUTO: 5.39 M/UL (ref 4.2–5.4)
SODIUM SERPL-SCNC: 139 MMOL/L (ref 135–145)
T3FREE SERPL-MCNC: 3.32 PG/ML (ref 2.4–4.2)
T4 FREE SERPL-MCNC: 0.84 NG/DL (ref 0.53–1.43)
TRIGL SERPL-MCNC: 134 MG/DL (ref 0–149)
TSH SERPL DL<=0.005 MIU/L-ACNC: 1.34 UIU/ML (ref 0.38–5.33)
WBC # BLD AUTO: 9.7 K/UL (ref 4.8–10.8)

## 2018-05-19 PROCEDURE — 80053 COMPREHEN METABOLIC PANEL: CPT

## 2018-05-19 PROCEDURE — 84481 FREE ASSAY (FT-3): CPT

## 2018-05-19 PROCEDURE — 84443 ASSAY THYROID STIM HORMONE: CPT

## 2018-05-19 PROCEDURE — 85025 COMPLETE CBC W/AUTO DIFF WBC: CPT

## 2018-05-19 PROCEDURE — 36415 COLL VENOUS BLD VENIPUNCTURE: CPT

## 2018-05-19 PROCEDURE — 84439 ASSAY OF FREE THYROXINE: CPT

## 2018-05-19 PROCEDURE — 80061 LIPID PANEL: CPT

## 2018-10-18 ENCOUNTER — IMMUNIZATION (OUTPATIENT)
Dept: SOCIAL WORK | Facility: CLINIC | Age: 48
End: 2018-10-18

## 2018-10-18 DIAGNOSIS — Z23 NEED FOR VACCINATION: ICD-10-CM

## 2018-10-18 PROCEDURE — 90686 IIV4 VACC NO PRSV 0.5 ML IM: CPT | Performed by: REGISTERED NURSE

## 2018-11-14 ENCOUNTER — TELEPHONE (OUTPATIENT)
Dept: NEUROLOGY | Facility: MEDICAL CENTER | Age: 48
End: 2018-11-14

## 2019-03-19 ENCOUNTER — OFFICE VISIT (OUTPATIENT)
Dept: NEUROLOGY | Facility: MEDICAL CENTER | Age: 49
End: 2019-03-19
Payer: COMMERCIAL

## 2019-03-19 VITALS
BODY MASS INDEX: 33.27 KG/M2 | RESPIRATION RATE: 16 BRPM | DIASTOLIC BLOOD PRESSURE: 76 MMHG | WEIGHT: 212 LBS | HEART RATE: 79 BPM | HEIGHT: 67 IN | SYSTOLIC BLOOD PRESSURE: 120 MMHG | OXYGEN SATURATION: 99 % | TEMPERATURE: 98.1 F

## 2019-03-19 DIAGNOSIS — G40.909 SEIZURE DISORDER (HCC): ICD-10-CM

## 2019-03-19 PROCEDURE — 99213 OFFICE O/P EST LOW 20 MIN: CPT | Performed by: NURSE PRACTITIONER

## 2019-03-19 RX ORDER — LAMOTRIGINE 250 MG/1
250 TABLET, EXTENDED RELEASE ORAL EVERY MORNING
Qty: 30 TAB | Refills: 12 | Status: SHIPPED | OUTPATIENT
Start: 2019-03-19 | End: 2019-04-16 | Stop reason: SDUPTHER

## 2019-03-19 ASSESSMENT — ENCOUNTER SYMPTOMS
COUGH: 0
DOUBLE VISION: 0
DEPRESSION: 0
DIARRHEA: 0
MUSCULOSKELETAL NEGATIVE: 1
VOMITING: 0
HEADACHES: 0
ABDOMINAL PAIN: 0
CONSTITUTIONAL NEGATIVE: 1
NERVOUS/ANXIOUS: 0
NAUSEA: 0
SEIZURES: 0
SORE THROAT: 0

## 2019-03-19 ASSESSMENT — PATIENT HEALTH QUESTIONNAIRE - PHQ9: CLINICAL INTERPRETATION OF PHQ2 SCORE: 0

## 2019-03-19 NOTE — PROGRESS NOTES
"Subjective:      Mir Cisneros is a 48 y.o. female who presents with Follow-Up (Seizure )            HPI She tried to reduce the Lamotrigine from 300mg to 225mg and \"just didn't feel well\".  Difficult for her to state otherwise what occurred.  No apparent or witnessed seizures.  Since increasing dose back to LTG 250mg each day she feels stable and well.    Still has her regular cycle. She is eating well, has not changed anything about her diet.    She is frustrated with her weight gain and    Current Outpatient Prescriptions   Medication Sig Dispense Refill   • LamoTRIgine 25 MG TABLET SR 24 HR TAKE 4 TABLETS BY MOUTH DAILY AS DIRECTED BY  Tab 0   • LamoTRIgine 200 MG TABLET SR 24 HR TAKE 1 TABLET BY MOUTH IN THE MORNING 30 Tab 0   • PROAIR  (90 Base) MCG/ACT Aero Soln inhalation aerosol INHALE 2 PUFFS BY MOUTH EVERY FOUR HOURS AS NEEDED FOR SHORTNESS OF BREATH (WHEEZING). 1 Inhaler 1   • lamotrigine (LAMICTAL) 25 MG Tab Take 4 Tabs by mouth every day. 120 Tab 2   • LamoTRIgine 300 MG TABLET SR 24 HR Take 1 Tab by mouth every morning. 30 Tab 3   • levothyroxine (SYNTHROID) 75 MCG TABS Take 75 mcg by mouth every day.     • Probiotic Product (PROBIOTIC PO) Take  by mouth every day.     • Flaxseed, Linseed, (FLAXSEED OIL PO) Take  by mouth every bedtime. Unsure of dose      • Coenzyme Q10 (CO Q 10 PO) Take  by mouth every bedtime. Unsure of dose      • Multiple Vitamin (MULTIVITAMIN PO) Take  by mouth every bedtime.     • SYNTHROID 88 MCG Tab TAKE 1 TABLET BY MOUTH EVERY DAY ALTERNATING DAYS WITH 75MCG  5   • Vitamin D, Cholecalciferol, 1000 UNITS TABS Take  by mouth.     • liothyronine (CYTOMEL) 25 MCG TABS Take 12.5 mcg by mouth every day. Takes 1/2 tab-12.5mcg       No current facility-administered medications for this visit.          Review of Systems   Constitutional: Negative.    HENT: Negative for hearing loss, nosebleeds and sore throat.         No recent head injury.   Eyes: Negative for " "double vision.        No new loss of vision.   Respiratory: Negative for cough.         No recent lung infections.   Cardiovascular: Negative for chest pain.   Gastrointestinal: Negative for abdominal pain, diarrhea, nausea and vomiting.   Genitourinary: Negative.    Musculoskeletal: Negative.    Skin: Negative.    Neurological: Negative for seizures and headaches.   Endo/Heme/Allergies:        No history of endocrine dysfunction.  No new problems.   Psychiatric/Behavioral: Negative for depression. The patient is not nervous/anxious.         No recent mood changes.          Objective:     /76 (BP Location: Left arm, Patient Position: Sitting, BP Cuff Size: Adult)   Pulse 79   Temp 36.7 °C (98.1 °F) (Temporal)   Resp 16   Ht 1.702 m (5' 7.01\")   Wt 96.2 kg (212 lb)   SpO2 99%   BMI 33.20 kg/m²      Physical Exam   Constitutional: She is oriented to person, place, and time. She appears well-developed and well-nourished.   HENT:   Head: Normocephalic and atraumatic.   Eyes: EOM are normal.   Neck: Normal range of motion.   Cardiovascular: Normal rate and regular rhythm.    Pulmonary/Chest: Effort normal.   Neurological: She is alert and oriented to person, place, and time. She exhibits normal muscle tone. Gait normal.   No observable changes in neurologic status.  See initial new patient examination for details.    Skin: Skin is warm.   Psychiatric: She has a normal mood and affect.               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 She has been treated with lamotrigine for many years and did not feel well with taper of LTG from 300mg daily to 200mg daily.     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  Routine EEG and 24 hour aEEG have both been normal.     Last known event suspicious for a seizure was in 2015.     We will continue LTG ER 250mg qam dose.      Return for follow-up in " one year.  Will call intercurrently if she wishes to again have  to taper Lamotrigine.       EDUCATION AND COUNSELING:  -Education was provided to the patient and/or family regarding diagnosis and prognosis. The chronic and unpredictable nature of the condition was discussed. There is increased risk for additional events, which may carry potential for significant injuries and death.    -We reviewed the current antiepileptic regimen. Potential side effects of antiepileptics were discussed at length, including but no limited to: hypersensitivity reactions (rash and others, some of which can be fatal), visual field changes (some of which may be irreversible), glaucoma, diplopia, kidney stones, osteopenia/osteoporosis/bone fractures, hyperthermia/anhydrosis, tremors, ataxia, dizziness, fatigue, increased risk for falls, cardiac arrhythmias/syncope, gastrointestinal (hepatitis, pancreatitis, gastritis, ulcers), gingival hypertrophy, drowsiness, sedation, anxiety/nervousness, increased risk for suicide, increased risk for depression, and psychosis. We reviewed drug-drug interactions and their potential effect on seizure control and medication side effects.    -Patient/family educated on SUDEP (Sudden Death in Epilepsy). Counseling was provided on the importance of strict medication and follow up compliance. The patient understands the risks associated with non-adherence with the medical plan as outlined, including but not limited to an increased risk for breakthrough seizures, which may contribute to injuries, disability, status epilepticus, and even death.    -Counseling was also provided on potential effects of alcohol and other drugs, which may lower seizure threshold and/or affect the metabolism of antiepileptic drugs. I recommend avoidance of alcohol and illegal drugs.  -Recommend proper hydration, regular exercise, proper sleep hygiene (7-8 hrs of overnight sleep, avoid sleep deprivation).    -I have made the  patient aware of mandatory reporting required by the law in the State [facility] regarding episodes of seizures, loss of consciousness, and/or alteration of awareness. The patient and family are responsible for reporting events to the DMV, instructions were provided. The patient verbalized understanding and states she has been driving. Other seizure precautions were discussed at length, including no diving, no skydiving, no unsupervised swimming, no Jacuzzi or bathing in bathtubs.          Pt agrees with plan.

## 2019-04-16 DIAGNOSIS — R56.9 SEIZURE (HCC): ICD-10-CM

## 2019-04-16 RX ORDER — LAMOTRIGINE 250 MG/1
250 TABLET, EXTENDED RELEASE ORAL EVERY MORNING
Qty: 30 TAB | Refills: 11 | Status: SHIPPED | OUTPATIENT
Start: 2019-04-16 | End: 2020-03-11 | Stop reason: SDUPTHER

## 2019-04-19 ENCOUNTER — HOSPITAL ENCOUNTER (OUTPATIENT)
Dept: LAB | Facility: MEDICAL CENTER | Age: 49
End: 2019-04-19
Attending: FAMILY MEDICINE
Payer: COMMERCIAL

## 2019-04-19 LAB
ALBUMIN SERPL BCP-MCNC: 4.2 G/DL (ref 3.2–4.9)
ALBUMIN/GLOB SERPL: 1.6 G/DL
ALP SERPL-CCNC: 60 U/L (ref 30–99)
ALT SERPL-CCNC: 15 U/L (ref 2–50)
ANION GAP SERPL CALC-SCNC: 9 MMOL/L (ref 0–11.9)
AST SERPL-CCNC: 15 U/L (ref 12–45)
BASOPHILS # BLD AUTO: 0.9 % (ref 0–1.8)
BASOPHILS # BLD: 0.07 K/UL (ref 0–0.12)
BILIRUB SERPL-MCNC: 0.8 MG/DL (ref 0.1–1.5)
BUN SERPL-MCNC: 9 MG/DL (ref 8–22)
CALCIUM SERPL-MCNC: 9.3 MG/DL (ref 8.5–10.5)
CHLORIDE SERPL-SCNC: 108 MMOL/L (ref 96–112)
CHOLEST SERPL-MCNC: 166 MG/DL (ref 100–199)
CO2 SERPL-SCNC: 26 MMOL/L (ref 20–33)
CREAT SERPL-MCNC: 0.86 MG/DL (ref 0.5–1.4)
EOSINOPHIL # BLD AUTO: 0.09 K/UL (ref 0–0.51)
EOSINOPHIL NFR BLD: 1.1 % (ref 0–6.9)
ERYTHROCYTE [DISTWIDTH] IN BLOOD BY AUTOMATED COUNT: 42.7 FL (ref 35.9–50)
FASTING STATUS PATIENT QL REPORTED: NORMAL
GLOBULIN SER CALC-MCNC: 2.6 G/DL (ref 1.9–3.5)
GLUCOSE SERPL-MCNC: 97 MG/DL (ref 65–99)
HCT VFR BLD AUTO: 46.6 % (ref 37–47)
HDLC SERPL-MCNC: 54 MG/DL
HGB BLD-MCNC: 14.9 G/DL (ref 12–16)
IMM GRANULOCYTES # BLD AUTO: 0.04 K/UL (ref 0–0.11)
IMM GRANULOCYTES NFR BLD AUTO: 0.5 % (ref 0–0.9)
LDLC SERPL CALC-MCNC: 84 MG/DL
LYMPHOCYTES # BLD AUTO: 2.3 K/UL (ref 1–4.8)
LYMPHOCYTES NFR BLD: 28.7 % (ref 22–41)
MCH RBC QN AUTO: 29.5 PG (ref 27–33)
MCHC RBC AUTO-ENTMCNC: 32 G/DL (ref 33.6–35)
MCV RBC AUTO: 92.3 FL (ref 81.4–97.8)
MONOCYTES # BLD AUTO: 0.73 K/UL (ref 0–0.85)
MONOCYTES NFR BLD AUTO: 9.1 % (ref 0–13.4)
NEUTROPHILS # BLD AUTO: 4.78 K/UL (ref 2–7.15)
NEUTROPHILS NFR BLD: 59.7 % (ref 44–72)
NRBC # BLD AUTO: 0 K/UL
NRBC BLD-RTO: 0 /100 WBC
PLATELET # BLD AUTO: 277 K/UL (ref 164–446)
PMV BLD AUTO: 10.8 FL (ref 9–12.9)
POTASSIUM SERPL-SCNC: 4.4 MMOL/L (ref 3.6–5.5)
PROT SERPL-MCNC: 6.8 G/DL (ref 6–8.2)
RBC # BLD AUTO: 5.05 M/UL (ref 4.2–5.4)
SODIUM SERPL-SCNC: 143 MMOL/L (ref 135–145)
TRIGL SERPL-MCNC: 139 MG/DL (ref 0–149)
TSH SERPL DL<=0.005 MIU/L-ACNC: 1.07 UIU/ML (ref 0.38–5.33)
WBC # BLD AUTO: 8 K/UL (ref 4.8–10.8)

## 2019-04-19 PROCEDURE — 80061 LIPID PANEL: CPT

## 2019-04-19 PROCEDURE — 85025 COMPLETE CBC W/AUTO DIFF WBC: CPT

## 2019-04-19 PROCEDURE — 80053 COMPREHEN METABOLIC PANEL: CPT

## 2019-04-19 PROCEDURE — 36415 COLL VENOUS BLD VENIPUNCTURE: CPT

## 2019-04-19 PROCEDURE — 84443 ASSAY THYROID STIM HORMONE: CPT

## 2019-05-16 ENCOUNTER — HOSPITAL ENCOUNTER (OUTPATIENT)
Dept: LAB | Facility: MEDICAL CENTER | Age: 49
End: 2019-05-16
Attending: FAMILY MEDICINE
Payer: COMMERCIAL

## 2019-05-16 LAB
CRP SERPL HS-MCNC: 0.03 MG/DL (ref 0–0.75)
ERYTHROCYTE [SEDIMENTATION RATE] IN BLOOD BY WESTERGREN METHOD: 1 MM/HOUR (ref 0–20)
RHEUMATOID FACT SER IA-ACNC: <10 IU/ML (ref 0–14)
T3FREE SERPL-MCNC: 3.59 PG/ML (ref 2.4–4.2)
T4 FREE SERPL-MCNC: 1.28 NG/DL (ref 0.53–1.43)
THYROPEROXIDASE AB SERPL-ACNC: 18.6 IU/ML (ref 0–9)
TSH SERPL DL<=0.005 MIU/L-ACNC: 1.51 UIU/ML (ref 0.38–5.33)

## 2019-05-16 PROCEDURE — 86431 RHEUMATOID FACTOR QUANT: CPT

## 2019-05-16 PROCEDURE — 84481 FREE ASSAY (FT-3): CPT

## 2019-05-16 PROCEDURE — 86038 ANTINUCLEAR ANTIBODIES: CPT

## 2019-05-16 PROCEDURE — 36415 COLL VENOUS BLD VENIPUNCTURE: CPT

## 2019-05-16 PROCEDURE — 84443 ASSAY THYROID STIM HORMONE: CPT

## 2019-05-16 PROCEDURE — 84439 ASSAY OF FREE THYROXINE: CPT

## 2019-05-16 PROCEDURE — 86140 C-REACTIVE PROTEIN: CPT

## 2019-05-16 PROCEDURE — 86376 MICROSOMAL ANTIBODY EACH: CPT

## 2019-05-16 PROCEDURE — 85652 RBC SED RATE AUTOMATED: CPT

## 2019-05-18 LAB — NUCLEAR IGG SER QL IA: NORMAL

## 2019-07-22 ENCOUNTER — APPOINTMENT (RX ONLY)
Dept: URBAN - METROPOLITAN AREA CLINIC 22 | Facility: CLINIC | Age: 49
Setting detail: DERMATOLOGY
End: 2019-07-22

## 2019-07-22 DIAGNOSIS — L82.1 OTHER SEBORRHEIC KERATOSIS: ICD-10-CM

## 2019-07-22 DIAGNOSIS — L57.0 ACTINIC KERATOSIS: ICD-10-CM

## 2019-07-22 DIAGNOSIS — L73.8 OTHER SPECIFIED FOLLICULAR DISORDERS: ICD-10-CM

## 2019-07-22 PROBLEM — E03.9 HYPOTHYROIDISM, UNSPECIFIED: Status: ACTIVE | Noted: 2019-07-22

## 2019-07-22 PROBLEM — G40.909 EPILEPSY, UNSPECIFIED, NOT INTRACTABLE, WITHOUT STATUS EPILEPTICUS: Status: ACTIVE | Noted: 2019-07-22

## 2019-07-22 PROCEDURE — ? LIQUID NITROGEN

## 2019-07-22 PROCEDURE — 17000 DESTRUCT PREMALG LESION: CPT | Mod: 59

## 2019-07-22 PROCEDURE — ? COUNSELING

## 2019-07-22 PROCEDURE — ? ELECTRODESICCATION

## 2019-07-22 PROCEDURE — 99213 OFFICE O/P EST LOW 20 MIN: CPT | Mod: 25

## 2019-07-22 PROCEDURE — 17003 DESTRUCT PREMALG LES 2-14: CPT | Mod: 59

## 2019-07-22 ASSESSMENT — LOCATION SIMPLE DESCRIPTION DERM
LOCATION SIMPLE: RIGHT CHEEK
LOCATION SIMPLE: LEFT FOREHEAD
LOCATION SIMPLE: GLABELLA
LOCATION SIMPLE: RIGHT FOREHEAD
LOCATION SIMPLE: LEFT CHEEK
LOCATION SIMPLE: LEFT EYEBROW

## 2019-07-22 ASSESSMENT — LOCATION DETAILED DESCRIPTION DERM
LOCATION DETAILED: LEFT MEDIAL MALAR CHEEK
LOCATION DETAILED: LEFT MEDIAL EYEBROW
LOCATION DETAILED: LEFT INFERIOR MEDIAL FOREHEAD
LOCATION DETAILED: RIGHT MEDIAL MALAR CHEEK
LOCATION DETAILED: GLABELLA
LOCATION DETAILED: LEFT CENTRAL MALAR CHEEK
LOCATION DETAILED: RIGHT INFERIOR MEDIAL FOREHEAD
LOCATION DETAILED: LEFT LATERAL MALAR CHEEK

## 2019-07-22 ASSESSMENT — LOCATION ZONE DERM: LOCATION ZONE: FACE

## 2019-07-22 NOTE — PROCEDURE: ELECTRODESICCATION
Consent: The patient's consent was obtained including but not limited to risks of crusting, scabbing, blistering, scarring, darker or lighter pigmentary change, recurrence, incomplete removal and infection.
Post-Care Instructions: I reviewed with the patient in detail post-care instructions. Patient is to wear sunprotection, and avoid picking at any of the treated lesions. Pt may apply Vaseline to crusted or scabbing areas
Anesthesia Type: 1% lidocaine with epinephrine
Medical Necessity Clause: This procedure was medically necessary because the lesions that were treated were:
Medical Necessity Information: It is in your best interest to select a reason for this procedure from the list below. All of these items fulfill various CMS LCD requirements except the new and changing color options.
Detail Level: Simple
Include Z78.9 (Other Specified Conditions Influencing Health Status) As An Associated Diagnosis?: No

## 2019-07-22 NOTE — PROCEDURE: LIQUID NITROGEN
Detail Level: Detailed
Render Note In Bullet Format When Appropriate: No
Duration Of Freeze Thaw-Cycle (Seconds): 3
Post-Care Instructions: I reviewed with the patient in detail post-care instructions. Patient is to wear sunprotection, and avoid picking at any of the treated lesions. Pt may apply Vaseline to crusted or scabbing areas.
Number Of Freeze-Thaw Cycles: 2 freeze-thaw cycles
Consent: The patient's consent was obtained including but not limited to risks of crusting, scabbing, blistering, scarring, darker or lighter pigmentary change, recurrence, incomplete removal and infection.

## 2019-09-04 ENCOUNTER — HOSPITAL ENCOUNTER (OUTPATIENT)
Dept: RADIOLOGY | Facility: MEDICAL CENTER | Age: 49
End: 2019-09-04
Attending: FAMILY MEDICINE
Payer: COMMERCIAL

## 2019-09-04 DIAGNOSIS — R10.32 ABDOMINAL PAIN, LEFT LOWER QUADRANT: ICD-10-CM

## 2019-09-04 PROCEDURE — 76830 TRANSVAGINAL US NON-OB: CPT

## 2019-09-08 ENCOUNTER — HOSPITAL ENCOUNTER (OUTPATIENT)
Dept: RADIOLOGY | Facility: MEDICAL CENTER | Age: 49
End: 2019-09-08
Attending: FAMILY MEDICINE
Payer: COMMERCIAL

## 2019-09-08 DIAGNOSIS — R10.32 ABDOMINAL PAIN, LEFT LOWER QUADRANT: ICD-10-CM

## 2019-09-08 PROCEDURE — 700117 HCHG RX CONTRAST REV CODE 255: Performed by: FAMILY MEDICINE

## 2019-09-08 PROCEDURE — 74177 CT ABD & PELVIS W/CONTRAST: CPT

## 2019-09-08 RX ADMIN — IOHEXOL 100 ML: 350 INJECTION, SOLUTION INTRAVENOUS at 11:50

## 2019-09-08 RX ADMIN — IOHEXOL 25 ML: 240 INJECTION, SOLUTION INTRATHECAL; INTRAVASCULAR; INTRAVENOUS; ORAL at 11:50

## 2019-09-20 ENCOUNTER — HOSPITAL ENCOUNTER (OUTPATIENT)
Dept: RADIOLOGY | Facility: MEDICAL CENTER | Age: 49
End: 2019-09-20
Attending: OBSTETRICS & GYNECOLOGY
Payer: COMMERCIAL

## 2019-09-20 DIAGNOSIS — Z12.31 VISIT FOR SCREENING MAMMOGRAM: ICD-10-CM

## 2019-09-20 PROCEDURE — 77063 BREAST TOMOSYNTHESIS BI: CPT

## 2019-09-28 ENCOUNTER — HOSPITAL ENCOUNTER (OUTPATIENT)
Dept: LAB | Facility: MEDICAL CENTER | Age: 49
End: 2019-09-28
Attending: NURSE PRACTITIONER
Payer: COMMERCIAL

## 2019-09-28 LAB
BASOPHILS # BLD AUTO: 0.9 % (ref 0–1.8)
BASOPHILS # BLD: 0.08 K/UL (ref 0–0.12)
EOSINOPHIL # BLD AUTO: 0.11 K/UL (ref 0–0.51)
EOSINOPHIL NFR BLD: 1.3 % (ref 0–6.9)
ERYTHROCYTE [DISTWIDTH] IN BLOOD BY AUTOMATED COUNT: 42.2 FL (ref 35.9–50)
HCT VFR BLD AUTO: 50.2 % (ref 37–47)
HGB BLD-MCNC: 15.9 G/DL (ref 12–16)
IMM GRANULOCYTES # BLD AUTO: 0.03 K/UL (ref 0–0.11)
IMM GRANULOCYTES NFR BLD AUTO: 0.4 % (ref 0–0.9)
LYMPHOCYTES # BLD AUTO: 2.85 K/UL (ref 1–4.8)
LYMPHOCYTES NFR BLD: 33.6 % (ref 22–41)
MCH RBC QN AUTO: 29 PG (ref 27–33)
MCHC RBC AUTO-ENTMCNC: 31.7 G/DL (ref 33.6–35)
MCV RBC AUTO: 91.4 FL (ref 81.4–97.8)
MONOCYTES # BLD AUTO: 0.76 K/UL (ref 0–0.85)
MONOCYTES NFR BLD AUTO: 9 % (ref 0–13.4)
NEUTROPHILS # BLD AUTO: 4.66 K/UL (ref 2–7.15)
NEUTROPHILS NFR BLD: 54.8 % (ref 44–72)
NRBC # BLD AUTO: 0 K/UL
NRBC BLD-RTO: 0 /100 WBC
PLATELET # BLD AUTO: 271 K/UL (ref 164–446)
PMV BLD AUTO: 10.4 FL (ref 9–12.9)
RBC # BLD AUTO: 5.49 M/UL (ref 4.2–5.4)
WBC # BLD AUTO: 8.5 K/UL (ref 4.8–10.8)

## 2019-09-28 PROCEDURE — 85025 COMPLETE CBC W/AUTO DIFF WBC: CPT

## 2019-09-28 PROCEDURE — 36415 COLL VENOUS BLD VENIPUNCTURE: CPT

## 2019-12-08 ENCOUNTER — OFFICE VISIT (OUTPATIENT)
Dept: URGENT CARE | Facility: PHYSICIAN GROUP | Age: 49
End: 2019-12-08
Payer: COMMERCIAL

## 2019-12-08 VITALS
WEIGHT: 205 LBS | BODY MASS INDEX: 32.18 KG/M2 | HEIGHT: 67 IN | SYSTOLIC BLOOD PRESSURE: 138 MMHG | HEART RATE: 80 BPM | DIASTOLIC BLOOD PRESSURE: 82 MMHG | TEMPERATURE: 97.9 F | OXYGEN SATURATION: 98 %

## 2019-12-08 DIAGNOSIS — R05.9 COUGH: ICD-10-CM

## 2019-12-08 DIAGNOSIS — J45.21 MILD INTERMITTENT ASTHMA WITH EXACERBATION: ICD-10-CM

## 2019-12-08 DIAGNOSIS — J01.00 ACUTE MAXILLARY SINUSITIS, RECURRENCE NOT SPECIFIED: ICD-10-CM

## 2019-12-08 PROCEDURE — 99214 OFFICE O/P EST MOD 30 MIN: CPT | Performed by: PHYSICIAN ASSISTANT

## 2019-12-08 RX ORDER — METHYLPREDNISOLONE 4 MG/1
TABLET ORAL
Qty: 21 TAB | Refills: 0 | Status: SHIPPED | OUTPATIENT
Start: 2019-12-08 | End: 2020-03-11

## 2019-12-08 RX ORDER — AMOXICILLIN AND CLAVULANATE POTASSIUM 875; 125 MG/1; MG/1
1 TABLET, FILM COATED ORAL 2 TIMES DAILY
Qty: 20 TAB | Refills: 0 | Status: SHIPPED | OUTPATIENT
Start: 2019-12-08 | End: 2020-03-11

## 2019-12-08 ASSESSMENT — ENCOUNTER SYMPTOMS
MYALGIAS: 1
SORE THROAT: 1
WHEEZING: 1
NAUSEA: 0
SHORTNESS OF BREATH: 0
ABDOMINAL PAIN: 0
SPUTUM PRODUCTION: 0
FEVER: 0
COUGH: 1
CHILLS: 1
VOMITING: 0
DIARRHEA: 0

## 2019-12-08 NOTE — PROGRESS NOTES
"Subjective:   Mir Cisneros  is a 49 y.o. female who presents for Sinus Problem (sinus pressure, headaches, green mucus)        Sinus Problem   This is a new problem. The current episode started in the past 7 days. Associated symptoms include chills, congestion, coughing and a sore throat. Pertinent negatives include no ear pain or shortness of breath.     Patient was clinic describing last few months of sinus congestion pressure worsening for the last 3 to 4 days noted significant increase in sinus pressure particularly over cheeks left greater than right side.  Notes purulent nasal drainage is yellow and green in nature.  She notes chills and body aches but denies fevers has had persistent coughing.  Notes past medical history of asthma.  Notes bronchospastic coughing that has been difficult to stop at times.  Denies ear pain notes some irritation throat.  Denies nausea vomiting abdominal pain diarrhea or rash.  Notes remote history of bronchitis.  Denies history of pneumonia.  Has tried over-the-counter DayQuil and NyQuil for symptom support thus far.  Does note past medical history of seasonal allergies without recent treatment.    Review of Systems   Constitutional: Positive for chills. Negative for fever.   HENT: Positive for congestion and sore throat. Negative for ear pain and nosebleeds.    Respiratory: Positive for cough and wheezing. Negative for sputum production and shortness of breath.    Gastrointestinal: Negative for abdominal pain, diarrhea, nausea and vomiting.   Musculoskeletal: Positive for myalgias.   Skin: Negative for rash.   Endo/Heme/Allergies: Positive for environmental allergies.     No Known Allergies   I have worn a mask for the entire encounter with this patient.    Objective:   /82   Pulse 80   Temp 36.6 °C (97.9 °F)   Ht 1.702 m (5' 7\")   Wt 93 kg (205 lb)   SpO2 98%   BMI 32.11 kg/m²   Physical Exam  Vitals signs and nursing note reviewed.   Constitutional:      "  General: She is not in acute distress.     Appearance: She is well-developed. She is not diaphoretic.   HENT:      Head: Normocephalic and atraumatic.      Right Ear: Ear canal and external ear normal. Tympanic membrane is bulging. Tympanic membrane is not erythematous.      Left Ear: Ear canal and external ear normal. Tympanic membrane is bulging. Tympanic membrane is not erythematous.      Nose:      Right Sinus: Maxillary sinus tenderness present. No frontal sinus tenderness.      Left Sinus: Maxillary sinus tenderness present. No frontal sinus tenderness.      Comments: L>R       Mouth/Throat:      Pharynx: Uvula midline. Posterior oropharyngeal erythema ( PND) present. No oropharyngeal exudate.      Tonsils: No tonsillar abscesses.   Eyes:      General: Lids are normal. No scleral icterus.        Right eye: No discharge.         Left eye: No discharge.      Conjunctiva/sclera: Conjunctivae normal.   Neck:      Musculoskeletal: Neck supple.   Pulmonary:      Effort: Pulmonary effort is normal. No respiratory distress.      Breath sounds: No decreased breath sounds, wheezing, rhonchi or rales.   Musculoskeletal: Normal range of motion.   Lymphadenopathy:      Cervical: Cervical adenopathy ( mild bilat) present.   Skin:     General: Skin is warm and dry.      Coloration: Skin is not pale.      Findings: No erythema.   Neurological:      Mental Status: She is alert and oriented to person, place, and time. She is not disoriented.   Psychiatric:         Speech: Speech normal.         Behavior: Behavior normal.           Assessment/Plan:   1. Acute maxillary sinusitis, recurrence not specified  - amoxicillin-clavulanate (AUGMENTIN) 875-125 MG Tab; Take 1 Tab by mouth 2 times a day.  Dispense: 20 Tab; Refill: 0    2. Mild intermittent asthma with exacerbation  - methylPREDNISolone (MEDROL DOSEPAK) 4 MG Tablet Therapy Pack; Follow schedule on package instructions.  Dispense: 21 Tab; Refill: 0    3. Cough  Supportive  care is reviewed with patient/caregiver - recommend to push PO fluids and electrolytes, Nsaids/tylenol, netti pot/saline irrig, humidifier in home, flonase, ponaris,  take full course of Rx, take with probiotics, observe for resolution  Return to clinic with lack of resolution or progression of symptoms.    OTC nsaids  Differential diagnosis, natural history, supportive care, and indications for immediate follow-up discussed.

## 2020-03-11 ENCOUNTER — OFFICE VISIT (OUTPATIENT)
Dept: NEUROLOGY | Facility: MEDICAL CENTER | Age: 50
End: 2020-03-11
Payer: COMMERCIAL

## 2020-03-11 VITALS
HEART RATE: 76 BPM | DIASTOLIC BLOOD PRESSURE: 76 MMHG | TEMPERATURE: 96.4 F | OXYGEN SATURATION: 96 % | BODY MASS INDEX: 32.18 KG/M2 | SYSTOLIC BLOOD PRESSURE: 132 MMHG | RESPIRATION RATE: 14 BRPM | HEIGHT: 67 IN | WEIGHT: 205.03 LBS

## 2020-03-11 DIAGNOSIS — R56.9 SEIZURE (HCC): ICD-10-CM

## 2020-03-11 PROCEDURE — 99213 OFFICE O/P EST LOW 20 MIN: CPT | Performed by: NURSE PRACTITIONER

## 2020-03-11 RX ORDER — LAMOTRIGINE 250 MG/1
250 TABLET, EXTENDED RELEASE ORAL EVERY MORNING
Qty: 30 TAB | Refills: 5 | Status: SHIPPED | OUTPATIENT
Start: 2020-03-11 | End: 2020-07-15 | Stop reason: SDUPTHER

## 2020-03-11 ASSESSMENT — ENCOUNTER SYMPTOMS
SORE THROAT: 0
CONSTITUTIONAL NEGATIVE: 1
ABDOMINAL PAIN: 1
DIARRHEA: 0
HEADACHES: 0
NERVOUS/ANXIOUS: 0
COUGH: 0
NAUSEA: 0
VOMITING: 0
SEIZURES: 0
DOUBLE VISION: 0
MUSCULOSKELETAL NEGATIVE: 1
DEPRESSION: 0

## 2020-03-11 ASSESSMENT — PATIENT HEALTH QUESTIONNAIRE - PHQ9: CLINICAL INTERPRETATION OF PHQ2 SCORE: 0

## 2020-03-11 ASSESSMENT — FIBROSIS 4 INDEX: FIB4 SCORE: 0.71

## 2020-03-11 NOTE — PROGRESS NOTES
"Subjective:      Mir Cisneros is a 49 y.o. female who presents with No chief complaint on file.          HPI She tried to reduce the Lamotrigine from 300mg to 225mg and \"just didn't feel well\".  Difficult for her to state otherwise what occurred.  No apparent or witnessed seizures.  Since increasing dose back to LTG 250mg each day she feels stable and well.  She would like to continue to take this same dose.     She is frustrated with her weight gain.  She has had a colonoscopy.  Still having random and vague pains.  This has been her primary concern however all testing has been benign.    Seasonal allergies with runny nose today.     Current Outpatient Medications   Medication Sig Dispense Refill   • amoxicillin-clavulanate (AUGMENTIN) 875-125 MG Tab Take 1 Tab by mouth 2 times a day. 20 Tab 0   • methylPREDNISolone (MEDROL DOSEPAK) 4 MG Tablet Therapy Pack Follow schedule on package instructions. 21 Tab 0   • LamoTRIgine 250 MG TABLET SR 24 HR Take 250 mg by mouth every morning. 30 Tab 11   • LamoTRIgine 25 MG TABLET SR 24 HR TAKE 4 TABLETS BY MOUTH DAILY AS DIRECTED BY  Tab 0   • LamoTRIgine 200 MG TABLET SR 24 HR TAKE 1 TABLET BY MOUTH IN THE MORNING 30 Tab 0   • PROAIR  (90 Base) MCG/ACT Aero Soln inhalation aerosol INHALE 2 PUFFS BY MOUTH EVERY FOUR HOURS AS NEEDED FOR SHORTNESS OF BREATH (WHEEZING). 1 Inhaler 1   • lamotrigine (LAMICTAL) 25 MG Tab Take 4 Tabs by mouth every day. 120 Tab 2   • levothyroxine (SYNTHROID) 75 MCG TABS Take 75 mcg by mouth every day.     • Probiotic Product (PROBIOTIC PO) Take  by mouth every day.     • Flaxseed, Linseed, (FLAXSEED OIL PO) Take  by mouth every bedtime. Unsure of dose      • Coenzyme Q10 (CO Q 10 PO) Take  by mouth every bedtime. Unsure of dose      • Multiple Vitamin (MULTIVITAMIN PO) Take  by mouth every bedtime.       No current facility-administered medications for this visit.          Review of Systems   Constitutional: Negative.  "   HENT: Negative for hearing loss, nosebleeds and sore throat.         No recent head injury.   Eyes: Negative for double vision.        No new loss of vision.   Respiratory: Negative for cough.         No recent lung infections.   Cardiovascular: Negative for chest pain.   Gastrointestinal: Positive for abdominal pain (random). Negative for diarrhea, nausea and vomiting.   Genitourinary: Negative.    Musculoskeletal: Negative.    Skin: Negative.    Neurological: Negative for seizures and headaches.   Endo/Heme/Allergies:        No history of endocrine dysfunction.  No new problems.   Psychiatric/Behavioral: Negative for depression. The patient is not nervous/anxious.         No recent mood changes.          Objective:     There were no vitals taken for this visit.     Physical Exam  Constitutional:       General: She is not in acute distress.  HENT:      Head: Normocephalic and atraumatic.      Nose: Rhinorrhea present.   Eyes:      Pupils: Pupils are equal, round, and reactive to light.   Neck:      Musculoskeletal: Normal range of motion.   Cardiovascular:      Rate and Rhythm: Normal rate and regular rhythm.   Pulmonary:      Effort: Pulmonary effort is normal. No respiratory distress.      Breath sounds: Normal breath sounds.   Musculoskeletal: Normal range of motion.   Skin:     General: Skin is warm and dry.   Neurological:      Mental Status: She is alert and oriented to person, place, and time.      Cranial Nerves: No cranial nerve deficit.      Motor: No abnormal muscle tone.      Coordination: Coordination normal.      Gait: Gait normal.      Deep Tendon Reflexes: Reflexes are normal and symmetric.      Comments: No observable changes in neurologic status.  See initial new patient examination for details.     Psychiatric:         Mood and Affect: Mood normal.               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 She has been  treated with lamotrigine for many years and did not feel well with taper of LTG from 300mg daily to 200mg daily.     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  Routine EEG and 24 hour aEEG have both have been normal.     Last known event suspicious for a seizure was in 2015.     We will continue LTG ER 250mg qam dose.      Return for follow-up in one year.  Will call intercurrently if she wishes to again have  to taper Lamotrigine.        EDUCATION AND COUNSELING:  -Education was provided to the patient and/or family regarding diagnosis and prognosis. The chronic and unpredictable nature of the condition was discussed. There is increased risk for additional events, which may carry potential for significant injuries and death.    -We reviewed the current antiepileptic regimen. Potential side effects of antiepileptics were discussed at length, including but no limited to: hypersensitivity reactions (rash and others, some of which can be fatal), visual field changes (some of which may be irreversible), glaucoma, diplopia, kidney stones, osteopenia/osteoporosis/bone fractures, hyperthermia/anhydrosis, tremors, ataxia, dizziness, fatigue, increased risk for falls, cardiac arrhythmias/syncope, gastrointestinal (hepatitis, pancreatitis, gastritis, ulcers), gingival hypertrophy, drowsiness, sedation, anxiety/nervousness, increased risk for suicide, increased risk for depression, and psychosis. We reviewed drug-drug interactions and their potential effect on seizure control and medication side effects.    -Patient/family educated on SUDEP (Sudden Death in Epilepsy). Counseling was provided on the importance of strict medication and follow up compliance. The patient understands the risks associated with non-adherence with the medical plan as outlined, including but not limited to an increased risk for breakthrough seizures, which may contribute to injuries, disability, status  epilepticus, and even death.    -Counseling was also provided on potential effects of alcohol and other drugs, which may lower seizure threshold and/or affect the metabolism of antiepileptic drugs. I recommend avoidance of alcohol and illegal drugs.  -Recommend proper hydration, regular exercise, proper sleep hygiene (7-8 hrs of overnight sleep, avoid sleep deprivation).    -I have made the patient aware of mandatory reporting required by the law in the State [facility] regarding episodes of seizures, loss of consciousness, and/or alteration of awareness. The patient and family are responsible for reporting events to the DMV, instructions were provided. The patient verbalized understanding and states she has been driving. Other seizure precautions were discussed at length, including no diving, no skydiving, no unsupervised swimming, no Jacuzzi or bathing in bathtubs.          Pt agrees with plan.

## 2020-06-02 ENCOUNTER — TELEPHONE (OUTPATIENT)
Dept: RADIOLOGY | Facility: MEDICAL CENTER | Age: 50
End: 2020-06-02

## 2020-06-05 ENCOUNTER — OFFICE VISIT (OUTPATIENT)
Dept: ADMISSIONS | Facility: MEDICAL CENTER | Age: 50
End: 2020-06-05
Payer: COMMERCIAL

## 2020-06-05 DIAGNOSIS — Z01.812 PRE-OPERATIVE LABORATORY EXAMINATION: ICD-10-CM

## 2020-06-05 LAB — COVID ORDER STATUS COVID19: NORMAL

## 2020-06-05 PROCEDURE — C9803 HOPD COVID-19 SPEC COLLECT: HCPCS

## 2020-06-07 LAB
SARS-COV-2 RNA RESP QL NAA+PROBE: NOT DETECTED
SPECIMEN SOURCE: NORMAL

## 2020-06-08 ENCOUNTER — HOSPITAL ENCOUNTER (OUTPATIENT)
Dept: RADIOLOGY | Facility: MEDICAL CENTER | Age: 50
End: 2020-06-08
Attending: FAMILY MEDICINE
Payer: COMMERCIAL

## 2020-06-08 DIAGNOSIS — J45.21 MILD INTERMITTENT ASTHMA WITH EXACERBATION: ICD-10-CM

## 2020-06-08 PROCEDURE — 71046 X-RAY EXAM CHEST 2 VIEWS: CPT

## 2020-06-09 ENCOUNTER — TELEPHONE (OUTPATIENT)
Dept: NEUROLOGY | Facility: MEDICAL CENTER | Age: 50
End: 2020-06-09

## 2020-06-10 ENCOUNTER — APPOINTMENT (OUTPATIENT)
Dept: RADIOLOGY | Facility: MEDICAL CENTER | Age: 50
End: 2020-06-10
Attending: NURSE PRACTITIONER
Payer: COMMERCIAL

## 2020-06-18 ENCOUNTER — HOSPITAL ENCOUNTER (OUTPATIENT)
Dept: LAB | Facility: MEDICAL CENTER | Age: 50
End: 2020-06-18
Attending: FAMILY MEDICINE
Payer: COMMERCIAL

## 2020-06-18 LAB
ALBUMIN SERPL BCP-MCNC: 4.4 G/DL (ref 3.2–4.9)
ALBUMIN/GLOB SERPL: 1.7 G/DL
ALP SERPL-CCNC: 67 U/L (ref 30–99)
ALT SERPL-CCNC: 16 U/L (ref 2–50)
ANION GAP SERPL CALC-SCNC: 9 MMOL/L (ref 7–16)
AST SERPL-CCNC: 14 U/L (ref 12–45)
BASOPHILS # BLD AUTO: 1 % (ref 0–1.8)
BASOPHILS # BLD: 0.09 K/UL (ref 0–0.12)
BILIRUB SERPL-MCNC: 0.9 MG/DL (ref 0.1–1.5)
BUN SERPL-MCNC: 7 MG/DL (ref 8–22)
CALCIUM SERPL-MCNC: 9.9 MG/DL (ref 8.5–10.5)
CHLORIDE SERPL-SCNC: 106 MMOL/L (ref 96–112)
CHOLEST SERPL-MCNC: 176 MG/DL (ref 100–199)
CO2 SERPL-SCNC: 26 MMOL/L (ref 20–33)
CREAT SERPL-MCNC: 0.92 MG/DL (ref 0.5–1.4)
EOSINOPHIL # BLD AUTO: 0.2 K/UL (ref 0–0.51)
EOSINOPHIL NFR BLD: 2.1 % (ref 0–6.9)
ERYTHROCYTE [DISTWIDTH] IN BLOOD BY AUTOMATED COUNT: 41.3 FL (ref 35.9–50)
FASTING STATUS PATIENT QL REPORTED: NORMAL
GLOBULIN SER CALC-MCNC: 2.6 G/DL (ref 1.9–3.5)
GLUCOSE SERPL-MCNC: 102 MG/DL (ref 65–99)
HCT VFR BLD AUTO: 48.9 % (ref 37–47)
HDLC SERPL-MCNC: 58 MG/DL
HGB BLD-MCNC: 16 G/DL (ref 12–16)
IMM GRANULOCYTES # BLD AUTO: 0.06 K/UL (ref 0–0.11)
IMM GRANULOCYTES NFR BLD AUTO: 0.6 % (ref 0–0.9)
LDLC SERPL CALC-MCNC: 90 MG/DL
LYMPHOCYTES # BLD AUTO: 3.35 K/UL (ref 1–4.8)
LYMPHOCYTES NFR BLD: 35.9 % (ref 22–41)
MCH RBC QN AUTO: 30.1 PG (ref 27–33)
MCHC RBC AUTO-ENTMCNC: 32.7 G/DL (ref 33.6–35)
MCV RBC AUTO: 92.1 FL (ref 81.4–97.8)
MONOCYTES # BLD AUTO: 1 K/UL (ref 0–0.85)
MONOCYTES NFR BLD AUTO: 10.7 % (ref 0–13.4)
NEUTROPHILS # BLD AUTO: 4.62 K/UL (ref 2–7.15)
NEUTROPHILS NFR BLD: 49.7 % (ref 44–72)
NRBC # BLD AUTO: 0 K/UL
NRBC BLD-RTO: 0 /100 WBC
PLATELET # BLD AUTO: 269 K/UL (ref 164–446)
PMV BLD AUTO: 10.2 FL (ref 9–12.9)
POTASSIUM SERPL-SCNC: 5 MMOL/L (ref 3.6–5.5)
PROT SERPL-MCNC: 7 G/DL (ref 6–8.2)
RBC # BLD AUTO: 5.31 M/UL (ref 4.2–5.4)
SODIUM SERPL-SCNC: 141 MMOL/L (ref 135–145)
TRIGL SERPL-MCNC: 142 MG/DL (ref 0–149)
TSH SERPL DL<=0.005 MIU/L-ACNC: 1.48 UIU/ML (ref 0.38–5.33)
WBC # BLD AUTO: 9.3 K/UL (ref 4.8–10.8)

## 2020-06-18 PROCEDURE — 84443 ASSAY THYROID STIM HORMONE: CPT

## 2020-06-18 PROCEDURE — 85025 COMPLETE CBC W/AUTO DIFF WBC: CPT

## 2020-06-18 PROCEDURE — 80053 COMPREHEN METABOLIC PANEL: CPT

## 2020-06-18 PROCEDURE — 80061 LIPID PANEL: CPT

## 2020-06-18 PROCEDURE — 36415 COLL VENOUS BLD VENIPUNCTURE: CPT

## 2020-07-15 ENCOUNTER — TELEMEDICINE (OUTPATIENT)
Dept: NEUROLOGY | Facility: MEDICAL CENTER | Age: 50
End: 2020-07-15
Payer: COMMERCIAL

## 2020-07-15 VITALS — BODY MASS INDEX: 32.1 KG/M2 | HEIGHT: 67 IN

## 2020-07-15 DIAGNOSIS — D18.00 CAVERNOUS ANGIOMA: ICD-10-CM

## 2020-07-15 DIAGNOSIS — R56.9 SEIZURE (HCC): ICD-10-CM

## 2020-07-15 DIAGNOSIS — J45.20 MILD INTERMITTENT ASTHMA IN ADULT WITHOUT COMPLICATION: ICD-10-CM

## 2020-07-15 PROCEDURE — 99213 OFFICE O/P EST LOW 20 MIN: CPT | Mod: 95,CR | Performed by: NURSE PRACTITIONER

## 2020-07-15 RX ORDER — LAMOTRIGINE 250 MG/1
250 TABLET, EXTENDED RELEASE ORAL EVERY MORNING
Qty: 30 TAB | Refills: 5 | Status: SHIPPED | OUTPATIENT
Start: 2020-07-15 | End: 2020-11-05

## 2020-07-15 NOTE — PROGRESS NOTES
Telemedicine Visit: Established Patient     This encounter was conducted via Zoom .   Verbal consent was obtained. Patient's identity was verified.    Subjective:   CC: Follow-up for Seizure Disorder  Mir Cisneros is a 49 y.o. female presenting for evaluation and management of:    Seen today for medical clearance to have a Brain MRI as scheduled on 7/22/2020.      Pharmacy has changed the lamotrigine generic brand three times in the past one year.  With the last refill, she has not felt well and has been feeling auras.  Requesting to return to name brand medication.    Asthma:  Brio long-acting inhaler was added last month when she had an asthma flare-up.  History of asthma since college years and is attributed to allergies.    Headaches: random, attributed to chronic sinus inflammation.    ROS   Denies any recent fevers or chills. No nausea or vomiting. No chest pains or shortness of breath. Sinus and post-nasal drip noted primarily in the morning hours.  Mild sore throat in the morning due to post-nasal drip.    No Known Allergies    Current medicines (including changes today)  Current Outpatient Medications   Medication Sig Dispense Refill   • LamoTRIgine 250 MG TABLET SR 24 HR Take 250 mg by mouth every morning. 30 Tab 5   • PROAIR  (90 Base) MCG/ACT Aero Soln inhalation aerosol INHALE 2 PUFFS BY MOUTH EVERY FOUR HOURS AS NEEDED FOR SHORTNESS OF BREATH (WHEEZING). 1 Inhaler 1   • levothyroxine (SYNTHROID) 75 MCG TABS Take 75 mcg by mouth every day.     • Probiotic Product (PROBIOTIC PO) Take  by mouth every day.     • Flaxseed, Linseed, (FLAXSEED OIL PO) Take  by mouth every bedtime. Unsure of dose      • Coenzyme Q10 (CO Q 10 PO) Take  by mouth every bedtime. Unsure of dose      • Multiple Vitamin (MULTIVITAMIN PO) Take  by mouth every bedtime.       No current facility-administered medications for this visit.        Patient Active Problem List    Diagnosis Date Noted   • Insomnia 12/16/2016    • BMI 32.0-32.9,adult 12/16/2016   • GAIL (obstructive sleep apnea) 10/31/2016   • Right shoulder pain 05/29/2014   • Sprain of right shoulder girdle 01/14/2014   • Cervicalgia 01/14/2014   • Asthma in adult    • Seizure (HCC)    • Heart burn    • Disorder of thyroid        Family History   Problem Relation Age of Onset   • Asthma Mother    • Other Mother         A Fib   • Other Father         Parkinsons   • Hypertension Other        She  has a past medical history of Anesthesia, Angioma, venous, Asthma in adult, Breath shortness, Chickenpox, Heart burn, Hypothyroidism, Pain (05-09-12), Pain (5/27/2014), Seizure (HCC), and Unspecified disorder of thyroid.  She  has a past surgical history that includes primary c section (2000); shoulder arthroscopy (1993); nerve ulnar transfer (5/11/2012); epicondylar stripping (5/11/2012); wrist arthroscopy (1997); dilation and curettage (12/2005); shoulder decompression arthroscopic (5/29/2014); clavicle distal excision (5/29/2014); and shoulder arthroscopy w/ rotator cuff repair (5/29/2014).       Objective:   There were no vitals taken for this visit.  Temp: 97.9 T  Weight: 201 pounds    Physical Exam:  Constitutional: Alert, no distress, well-groomed.  Skin: No rashes in visible areas.  Eye: Round. Conjunctiva clear, lids normal. No icterus.   ENMT: Lips pink without lesions, good dentition, moist mucous membranes. Phonation normal.  Neck: No masses, no thyromegaly. Moves freely without pain.  CV: Pulse as reported by patient  Respiratory: Unlabored respiratory effort, no cough or audible wheeze  Psych: Alert and oriented x3, normal affect and mood.       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 She has been treated with lamotrigine for many years and did not feel well with taper of LTG from 300mg daily to 200mg daily.  Currently taking generic  lamotrigine ER 250mg qam and has noticed auras.     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    Routine EEG and 24 hour aEEG have both have been normal.    Cleared by Neurology for upcoming MRI scheduled 7/22/2020.    Currently taking  Lamotrigine 250mg tablets-- small, oval, light coral color-- [employer].  She has felt unwell taking this generic distributor.  New Rx for name brand Lamictal to be submitted today.    May need to further increase LTG ER from 250mg to 300mg qam.      Follow-up: Return for follow-up in 6 months or sooner if needed.

## 2020-07-17 ENCOUNTER — OFFICE VISIT (OUTPATIENT)
Dept: ADMISSIONS | Facility: MEDICAL CENTER | Age: 50
End: 2020-07-17
Attending: NURSE PRACTITIONER
Payer: COMMERCIAL

## 2020-07-17 ENCOUNTER — TELEPHONE (OUTPATIENT)
Dept: RADIOLOGY | Facility: MEDICAL CENTER | Age: 50
End: 2020-07-17

## 2020-07-17 DIAGNOSIS — Z01.812 PRE-OPERATIVE LABORATORY EXAMINATION: ICD-10-CM

## 2020-07-17 LAB — COVID ORDER STATUS COVID19: NORMAL

## 2020-07-17 PROCEDURE — U0003 INFECTIOUS AGENT DETECTION BY NUCLEIC ACID (DNA OR RNA); SEVERE ACUTE RESPIRATORY SYNDROME CORONAVIRUS 2 (SARS-COV-2) (CORONAVIRUS DISEASE [COVID-19]), AMPLIFIED PROBE TECHNIQUE, MAKING USE OF HIGH THROUGHPUT TECHNOLOGIES AS DESCRIBED BY CMS-2020-01-R: HCPCS

## 2020-07-18 LAB
SARS-COV-2 RNA RESP QL NAA+PROBE: NOTDETECTED
SPECIMEN SOURCE: NORMAL

## 2020-07-22 ENCOUNTER — ANESTHESIA (OUTPATIENT)
Dept: RADIOLOGY | Facility: MEDICAL CENTER | Age: 50
End: 2020-07-22
Payer: COMMERCIAL

## 2020-07-22 ENCOUNTER — ANESTHESIA EVENT (OUTPATIENT)
Dept: RADIOLOGY | Facility: MEDICAL CENTER | Age: 50
End: 2020-07-22
Payer: COMMERCIAL

## 2020-07-22 ENCOUNTER — HOSPITAL ENCOUNTER (OUTPATIENT)
Dept: RADIOLOGY | Facility: MEDICAL CENTER | Age: 50
End: 2020-07-22
Attending: NURSE PRACTITIONER
Payer: COMMERCIAL

## 2020-07-22 VITALS
RESPIRATION RATE: 20 BRPM | BODY MASS INDEX: 32.25 KG/M2 | OXYGEN SATURATION: 98 % | WEIGHT: 205.47 LBS | HEIGHT: 67 IN | TEMPERATURE: 98.4 F | HEART RATE: 75 BPM

## 2020-07-22 DIAGNOSIS — R56.9 SEIZURE (HCC): ICD-10-CM

## 2020-07-22 PROCEDURE — 99153 MOD SED SAME PHYS/QHP EA: CPT

## 2020-07-22 PROCEDURE — A9576 INJ PROHANCE MULTIPACK: HCPCS | Performed by: NURSE PRACTITIONER

## 2020-07-22 PROCEDURE — 700117 HCHG RX CONTRAST REV CODE 255: Performed by: NURSE PRACTITIONER

## 2020-07-22 PROCEDURE — 700105 HCHG RX REV CODE 258: Performed by: ANESTHESIOLOGY

## 2020-07-22 PROCEDURE — 700111 HCHG RX REV CODE 636 W/ 250 OVERRIDE (IP): Performed by: ANESTHESIOLOGY

## 2020-07-22 RX ORDER — SODIUM CHLORIDE, SODIUM LACTATE, POTASSIUM CHLORIDE, CALCIUM CHLORIDE 600; 310; 30; 20 MG/100ML; MG/100ML; MG/100ML; MG/100ML
INJECTION, SOLUTION INTRAVENOUS CONTINUOUS
Status: DISCONTINUED | OUTPATIENT
Start: 2020-07-22 | End: 2020-07-23 | Stop reason: HOSPADM

## 2020-07-22 RX ORDER — MIDAZOLAM HYDROCHLORIDE 1 MG/ML
INJECTION INTRAMUSCULAR; INTRAVENOUS
Status: COMPLETED
Start: 2020-07-22 | End: 2020-07-22

## 2020-07-22 RX ORDER — ONDANSETRON 2 MG/ML
4 INJECTION INTRAMUSCULAR; INTRAVENOUS
Status: DISCONTINUED | OUTPATIENT
Start: 2020-07-22 | End: 2020-07-23 | Stop reason: HOSPADM

## 2020-07-22 RX ORDER — DIPHENHYDRAMINE HYDROCHLORIDE 50 MG/ML
12.5 INJECTION INTRAMUSCULAR; INTRAVENOUS
Status: DISCONTINUED | OUTPATIENT
Start: 2020-07-22 | End: 2020-07-23 | Stop reason: HOSPADM

## 2020-07-22 RX ORDER — HALOPERIDOL 5 MG/ML
1 INJECTION INTRAMUSCULAR
Status: DISCONTINUED | OUTPATIENT
Start: 2020-07-22 | End: 2020-07-23 | Stop reason: HOSPADM

## 2020-07-22 RX ORDER — SODIUM CHLORIDE, SODIUM LACTATE, POTASSIUM CHLORIDE, CALCIUM CHLORIDE 600; 310; 30; 20 MG/100ML; MG/100ML; MG/100ML; MG/100ML
INJECTION, SOLUTION INTRAVENOUS
Status: DISCONTINUED | OUTPATIENT
Start: 2020-07-22 | End: 2020-07-22 | Stop reason: SURG

## 2020-07-22 RX ADMIN — FENTANYL CITRATE 50 MCG: 50 INJECTION INTRAMUSCULAR; INTRAVENOUS at 08:01

## 2020-07-22 RX ADMIN — PROPOFOL 100 MCG/KG/MIN: 10 INJECTION, EMULSION INTRAVENOUS at 08:01

## 2020-07-22 RX ADMIN — MIDAZOLAM 2 MG: 1 INJECTION INTRAMUSCULAR; INTRAVENOUS at 08:00

## 2020-07-22 RX ADMIN — SODIUM CHLORIDE, POTASSIUM CHLORIDE, SODIUM LACTATE AND CALCIUM CHLORIDE: 600; 310; 30; 20 INJECTION, SOLUTION INTRAVENOUS at 07:57

## 2020-07-22 RX ADMIN — GADOTERIDOL 20 ML: 279.3 INJECTION, SOLUTION INTRAVENOUS at 09:04

## 2020-07-22 ASSESSMENT — FIBROSIS 4 INDEX: FIB4 SCORE: 0.65

## 2020-07-22 ASSESSMENT — PAIN SCALES - GENERAL: PAIN_LEVEL: 0

## 2020-07-22 NOTE — ANESTHESIA POSTPROCEDURE EVALUATION
Patient: Mir Cisneros    Procedure Summary     Date:  07/22/20 Room / Location:  [facility] MRI  [address]    Anesthesia Start:  0757 Anesthesia Stop:  0857    Procedure:  MR-BRAIN-WITH & W/O Diagnosis:       Seizure (HCC)      (3T seizure protocol as comparison study for monitoring venous angioma)    Scheduled Providers:   Responsible Provider:  Heike Muniz M.D.    Anesthesia Type:  MAC ASA Status:  2          Final Anesthesia Type: MAC  Last vitals  /64   Temp   98.4   Pulse 85   Resp     16   SpO2   95     Anesthesia Post Evaluation    Patient location during evaluation: PACU  Patient participation: complete - patient participated  Level of consciousness: awake and alert and sleepy but conscious  Pain score: 0    Airway patency: patent  Anesthetic complications: no  Cardiovascular status: hemodynamically stable  Respiratory status: acceptable  Hydration status: euvolemic    PONV: none           Nurse Pain Score: 0 (NPRS)

## 2020-07-22 NOTE — ANESTHESIA TIME REPORT
Anesthesia Start and Stop Event Times     Date Time Event    7/22/2020 0749 Ready for Procedure     0757 Anesthesia Start     0857 Anesthesia Stop        Responsible Staff  07/22/20    Name Role Begin End    Heike Muniz M.D. Anesth 0757 0857        Preop Diagnosis (Free Text):  Pre-op Diagnosis             Preop Diagnosis (Codes):    Post op Diagnosis  Seizure (HCC)      Premium Reason  Non-Premium    Comments:

## 2020-07-22 NOTE — ANESTHESIA PREPROCEDURE EVALUATION
Relevant Problems   ANESTHESIA   (+) GAIL (obstructive sleep apnea)      PULMONARY   (+) Asthma in adult      NEURO   (+) Seizure (HCC)       Physical Exam    Airway   Mallampati: I  TM distance: >3 FB  Neck ROM: full       Cardiovascular - normal exam  Rhythm: regular  Rate: normal  (-) murmur     Dental - normal exam           Pulmonary - normal exam  Breath sounds clear to auscultation     Abdominal - normal exam     Neurological - normal exam                 Anesthesia Plan    ASA 2       Plan - MAC             Induction: intravenous    Postoperative Plan: Postoperative administration of opioids is intended.    Pertinent diagnostic labs and testing reviewed    Informed Consent:    Anesthetic plan and risks discussed with patient.    Use of blood products discussed with: patient whom consented to blood products.

## 2020-07-22 NOTE — PROGRESS NOTES
Patient to MRI Outpatient Dept.  Patient informed process and plan of care during this visit.  Anesthesiologist, Dr Muniz spoke c Patient and discussed provider's plan of care.  MRI brain c & s contrast completed.  Patient tolerated diet and acitivities once awake and appropriate.  DC instructions discussed. Questions encouraged.  Questions answered &/or deferred to provider.  Pt and spouse spoke c Dr Muniz prior to leaving.  Patient DC'd in stable condition c responsible adult

## 2020-07-22 NOTE — DISCHARGE INSTRUCTIONS
MRI ADULT DISCHARGE INSTRUCTIONS    You have been medicated today for your scan. Please follow the instructions below to ensure your safe recovery. If you have any questions or problems, feel free to call us at [phone] or [phone].     1.   Have someone stay with you to assist you as needed.    2.   Do not drive or operate any mechanical devices.    3.   Do not perform any activity that requires concentration. Make no major decisions over the next 24 hours.     4.   Be careful changing positions from laying down to sitting or standing, as you may become dizzy.     5.   Do not drink alcohol for 48 hours.    6.   There are no restrictions for eating your normal meals. Drink fluids.    7.   You may continue your usual medications for pain, tranquilizers, muscle relaxants or sedatives when awake.     8.   Tomorrow, you may continue your normal daily activities.     9.   Pressure dressing on 10 - 15 minutes. If swelling or bleeding occurs when removed, continue placing direct pressure on injection site for another 5 minutes, or until bleeding stops.     Midazolam (VERSED)  What is this medicine?  You were given MIDAZOLAM (KAILA ibarra) for your procedure today. This medication is a benzodiazepine. It is used to cause relaxation or sleep before surgery and to block the memory of the procedure.  This medicine may be used for other purposes; ask your health care provider or pharmacist if you have questions.  What side effects may I notice from receiving this medicine?  Side effects that you should report to your doctor or health care professional as soon as possible:  • allergic reactions like skin rash, itching or hives, swelling of the face, lips, or tongue  • breathing problems  • confusion  • dizziness or lightheadedness  • fast, irregular heartbeat  • halluninations during recovery  • numbness or tingling in the hands or feet  • pain, redness, or swelling at site where injected  • seizures  Side effects that usually  do not require medical attention (report to your doctor or health care professional if they continue or are bothersome):  • coughing  • headache  • hiccups  • involuntary eye and muscle movements  • loss of memory of events just before, during, and after use  • nausea, vomiting  • speech problems  • tiredness  • trouble sleeping or nightmares  This list may not describe all possible side effects. Call your doctor for medical advice about side effects. You may report side effects to [facility] at [phone]-VOM-[phone].    Fentanyl  What is this medicine?  You were given FENTANYL (FEN ta nil) for your procedure today, it is a pain reliever. It is used to treat breakthrough pain that your long acting pain medicine does not control. Do not use this medicine for a pain that will go away in a few days like pain from surgery, doctor or dentist visits.   This medicine may be used for other purposes; ask your health care provider or pharmacist if you have questions.  What side effects may I notice from receiving this medicine?  Side effects that you should report to your doctor or health care professional as soon as possible:  • allergic reactions like skin rash, itching or hives, swelling of the face, lips, or tongue  • breathing problems  • changes in vision  • confusion  • dry mouth  • feeling faint, lightheaded  • hallucination  • irregular heartbeat  • mouth pain, sores  • problems with balance, talking, walking  • trouble passing urine or change in the amount of urine  • unusual bleeding or bruising  • unusually weak or tired  Side effects that usually do not require medical attention (report to your doctor or health care professional if they continue or are bothersome):  • dizzy  • headache  • loss of appetite  • nausea, vomiting  • sweating  • tingling in mouth  This list may not describe all possible side effects. Call your doctor for medical advice about side effects. You may report side effects to FDA at  1-800-FDA-1088.        I have been informed of and understand the above discharge instructions.

## 2020-09-17 ENCOUNTER — HOSPITAL ENCOUNTER (OUTPATIENT)
Dept: LAB | Facility: MEDICAL CENTER | Age: 50
End: 2020-09-17
Payer: COMMERCIAL

## 2020-09-17 LAB
COVID ORDER STATUS COVID19: NORMAL
SARS-COV-2 RNA RESP QL NAA+PROBE: NOTDETECTED
SPECIMEN SOURCE: NORMAL

## 2020-09-30 ENCOUNTER — HOSPITAL ENCOUNTER (OUTPATIENT)
Dept: LAB | Facility: MEDICAL CENTER | Age: 50
End: 2020-09-30
Payer: COMMERCIAL

## 2020-10-02 ENCOUNTER — HOSPITAL ENCOUNTER (OUTPATIENT)
Dept: RADIOLOGY | Facility: MEDICAL CENTER | Age: 50
End: 2020-10-02
Attending: FAMILY MEDICINE
Payer: COMMERCIAL

## 2020-10-02 DIAGNOSIS — Z12.31 VISIT FOR SCREENING MAMMOGRAM: ICD-10-CM

## 2020-10-02 PROCEDURE — 77067 SCR MAMMO BI INCL CAD: CPT

## 2020-10-08 ENCOUNTER — HOSPITAL ENCOUNTER (OUTPATIENT)
Dept: LAB | Facility: MEDICAL CENTER | Age: 50
End: 2020-10-08
Payer: COMMERCIAL

## 2020-10-08 LAB — COVID ORDER STATUS COVID19: NORMAL

## 2020-10-09 LAB
SARS-COV-2 RNA RESP QL NAA+PROBE: NOTDETECTED
SPECIMEN SOURCE: NORMAL

## 2020-10-14 ENCOUNTER — HOSPITAL ENCOUNTER (OUTPATIENT)
Dept: LAB | Facility: MEDICAL CENTER | Age: 50
End: 2020-10-14
Payer: COMMERCIAL

## 2020-10-14 LAB — COVID ORDER STATUS COVID19: NORMAL

## 2020-10-15 LAB
SARS-COV-2 RNA RESP QL NAA+PROBE: NOTDETECTED
SPECIMEN SOURCE: NORMAL

## 2020-10-27 ENCOUNTER — HOSPITAL ENCOUNTER (OUTPATIENT)
Dept: LAB | Facility: MEDICAL CENTER | Age: 50
End: 2020-10-27
Payer: COMMERCIAL

## 2020-10-27 LAB — COVID ORDER STATUS COVID19: NORMAL

## 2020-10-27 PROCEDURE — U0003 INFECTIOUS AGENT DETECTION BY NUCLEIC ACID (DNA OR RNA); SEVERE ACUTE RESPIRATORY SYNDROME CORONAVIRUS 2 (SARS-COV-2) (CORONAVIRUS DISEASE [COVID-19]), AMPLIFIED PROBE TECHNIQUE, MAKING USE OF HIGH THROUGHPUT TECHNOLOGIES AS DESCRIBED BY CMS-2020-01-R: HCPCS

## 2020-10-28 LAB
SARS-COV-2 RNA RESP QL NAA+PROBE: NOTDETECTED
SPECIMEN SOURCE: NORMAL

## 2020-10-30 ENCOUNTER — TELEMEDICINE (OUTPATIENT)
Dept: TELEHEALTH | Facility: TELEMEDICINE | Age: 50
End: 2020-10-30
Payer: COMMERCIAL

## 2020-10-30 DIAGNOSIS — J01.01 ACUTE RECURRENT MAXILLARY SINUSITIS: ICD-10-CM

## 2020-10-30 PROCEDURE — 99214 OFFICE O/P EST MOD 30 MIN: CPT | Mod: 95,CR | Performed by: NURSE PRACTITIONER

## 2020-10-30 RX ORDER — DOXYCYCLINE HYCLATE 100 MG
100 TABLET ORAL 2 TIMES DAILY
Qty: 14 TAB | Refills: 0 | Status: SHIPPED | OUTPATIENT
Start: 2020-10-30 | End: 2020-11-06

## 2020-10-30 ASSESSMENT — ENCOUNTER SYMPTOMS
CHILLS: 0
SORE THROAT: 0
HEMOPTYSIS: 0
COUGH: 0
SPUTUM PRODUCTION: 0
SHORTNESS OF BREATH: 0
SINUS PAIN: 1
WHEEZING: 0
FEVER: 0

## 2020-10-30 NOTE — PROGRESS NOTES
Subjective:      Mir Cisneros is a 50 y.o. female who presents with No chief complaint on file.            Mir presents for a virtual visit.  Identification was verified.  Patient was informed that encounter would be conducted over a secure, encrypted network and consent was obtained.  She reports a months long history of nasal congestion and sinus pressure.  She has had several sinus infections in the past 6 months with no significant prior history.  She had sinusitis in June and was prescribed back to back courses of Augmentin with short term relief.  When her symptoms returned in July, she thinks she was prescribed Levaquin.  Since then she has had nasal congestion but the pain and pressure was gone until this week.  Now she has progressively worsening Left maxillary sinus pain.  She denies any fever, chills, myalgias, otalgia, sore throat or cough.  She is taking Albuterol prn for her asthma.  She just started using Flonase within the past 2 weeks.  She has an order for a sinus CT placed by her PCP and plans on getting that next month.          Review of Systems   Constitutional: Positive for malaise/fatigue. Negative for chills and fever.   HENT: Positive for congestion and sinus pain. Negative for ear pain and sore throat.    Respiratory: Negative for cough, hemoptysis, sputum production, shortness of breath and wheezing.    Skin: Negative for rash.     Medications, Allergies, and current problem list reviewed today in Epic     Objective:     There were no vitals taken for this visit.     Physical Exam  Constitutional:       General: She is not in acute distress.     Appearance: She is well-developed. She is ill-appearing. She is not diaphoretic.      Comments: Mir appears fatigued and congested nasally.   HENT:      Head: Normocephalic.      Nose: Congestion and rhinorrhea present.      Comments: Left maxillary sinus TTP.     Mouth/Throat:      Pharynx: No oropharyngeal exudate or posterior  oropharyngeal erythema.   Eyes:      Conjunctiva/sclera: Conjunctivae normal.   Neck:      Musculoskeletal: Neck supple.      Thyroid: No thyromegaly.      Vascular: No JVD.      Trachea: No tracheal deviation.   Pulmonary:      Effort: Pulmonary effort is normal.   Skin:     Findings: No erythema or rash.   Neurological:      Mental Status: She is alert and oriented to person, place, and time.                 Assessment/Plan:        1. Acute recurrent maxillary sinusitis  - doxycycline (VIBRAMYCIN) 100 MG Tab; Take 1 Tab by mouth 2 times a day for 7 days.  Dispense: 14 Tab; Refill: 0    Discussed exam findings with Mir.  Differential reviewed including allergic, viral, inflammatory etiology.  Take full course of antibiotics.  GI and photosensitivity side effects discussed.  OTC Flonase daily and sudafed prn symptom management.  Maintain adequate po hydration.  RTC in 5-7 days if symptoms persist, sooner if worse.  Follow up with PCP and for sinus CT as previously directed.  Mir verbalized understanding of and agreed with plan of care.

## 2020-11-07 ENCOUNTER — HOSPITAL ENCOUNTER (OUTPATIENT)
Dept: LAB | Facility: MEDICAL CENTER | Age: 50
End: 2020-11-07
Payer: COMMERCIAL

## 2020-11-08 LAB — COVID ORDER STATUS COVID19: NORMAL

## 2020-11-09 LAB
SARS-COV-2 RNA RESP QL NAA+PROBE: NOTDETECTED
SPECIMEN SOURCE: NORMAL

## 2020-11-16 ENCOUNTER — HOSPITAL ENCOUNTER (OUTPATIENT)
Dept: RADIOLOGY | Facility: MEDICAL CENTER | Age: 50
End: 2020-11-16
Attending: FAMILY MEDICINE
Payer: COMMERCIAL

## 2020-11-16 DIAGNOSIS — J01.91 ACUTE RECURRENT SINUSITIS, UNSPECIFIED LOCATION: ICD-10-CM

## 2020-11-16 PROCEDURE — 70486 CT MAXILLOFACIAL W/O DYE: CPT

## 2020-11-18 ENCOUNTER — HOSPITAL ENCOUNTER (OUTPATIENT)
Dept: LAB | Facility: MEDICAL CENTER | Age: 50
End: 2020-11-18
Payer: COMMERCIAL

## 2020-11-24 ENCOUNTER — HOSPITAL ENCOUNTER (OUTPATIENT)
Dept: LAB | Facility: MEDICAL CENTER | Age: 50
End: 2020-11-24
Payer: COMMERCIAL

## 2020-11-25 LAB — COVID ORDER STATUS COVID19: NORMAL

## 2020-11-26 LAB
SARS-COV-2 RNA RESP QL NAA+PROBE: NOTDETECTED
SPECIMEN SOURCE: NORMAL

## 2020-11-28 ENCOUNTER — HOSPITAL ENCOUNTER (OUTPATIENT)
Dept: LAB | Facility: MEDICAL CENTER | Age: 50
End: 2020-11-28
Payer: COMMERCIAL

## 2020-12-02 ENCOUNTER — OFFICE VISIT (OUTPATIENT)
Dept: SLEEP MEDICINE | Facility: MEDICAL CENTER | Age: 50
End: 2020-12-02
Payer: COMMERCIAL

## 2020-12-02 VITALS
HEART RATE: 82 BPM | WEIGHT: 207 LBS | SYSTOLIC BLOOD PRESSURE: 128 MMHG | DIASTOLIC BLOOD PRESSURE: 84 MMHG | BODY MASS INDEX: 31.37 KG/M2 | OXYGEN SATURATION: 95 % | HEIGHT: 68 IN

## 2020-12-02 DIAGNOSIS — G47.33 OSA (OBSTRUCTIVE SLEEP APNEA): ICD-10-CM

## 2020-12-02 DIAGNOSIS — J45.40 MODERATE PERSISTENT ASTHMA NOT DEPENDENT ON SYSTEMIC STEROIDS: ICD-10-CM

## 2020-12-02 DIAGNOSIS — J45.40 MODERATE PERSISTENT ASTHMA IN ADULT WITHOUT COMPLICATION: ICD-10-CM

## 2020-12-02 DIAGNOSIS — J32.0 CHRONIC MAXILLARY SINUSITIS: ICD-10-CM

## 2020-12-02 PROCEDURE — 99244 OFF/OP CNSLTJ NEW/EST MOD 40: CPT | Performed by: INTERNAL MEDICINE

## 2020-12-02 RX ORDER — FLUTICASONE PROPIONATE 50 MCG
1 SPRAY, SUSPENSION (ML) NASAL DAILY
COMMUNITY
End: 2022-10-13

## 2020-12-02 RX ORDER — LEVOTHYROXINE SODIUM 88 MCG
88 TABLET ORAL
COMMUNITY
Start: 2020-11-23

## 2020-12-02 RX ORDER — BUDESONIDE AND FORMOTEROL FUMARATE DIHYDRATE 160; 4.5 UG/1; UG/1
2 AEROSOL RESPIRATORY (INHALATION) 2 TIMES DAILY
COMMUNITY
Start: 2020-11-12 | End: 2021-03-23 | Stop reason: SDUPTHER

## 2020-12-02 ASSESSMENT — ENCOUNTER SYMPTOMS
COUGH: 1
SPUTUM PRODUCTION: 1
WHEEZING: 1
HEMOPTYSIS: 0
SHORTNESS OF BREATH: 1

## 2020-12-02 ASSESSMENT — FIBROSIS 4 INDEX: FIB4 SCORE: 0.66

## 2020-12-02 NOTE — PROGRESS NOTES
"Pulmonary Clinic- Initial Consult    Date of Service: 12/2/2020    Referring Physician: Janet Diaz M.D.    Reason for Consult: Severe persistent asthma    Chief Complaint:   Chief Complaint   Patient presents with   • Establish Care     Referred by Dr Janet Diaz for Severe persisent Asthma/SOB on exertion.former smoker   • Other     CXR 06/08/20     HPI:   Mir Cisneros is a very pleasant 50 y.o. female distant former tobacco smoker 2 pack years, quit 1995 who is followed by Janet Diaz M.D. and is referred to the pulmonary clinic for asthma. She has a history of chronic sinusitis, focal epilepsy, and GAIL (PSG 2016 AHI 15.5), on CPAP and followed in the sleep clinic but last seen 2017.    Mir has had mild intermittent asthma since college, very mild and well-controlled rare use of rescue inhaler.  Her symptoms in the past has been exacerbated by stress and seasonal allergies. In the last 12 months symptoms have been worsening with cough and shortness of breath. No new changes in home/living/work life, although she is working more from home now due to the coronavirus pandemic. Had an ER visit in 4/2020 and 6/2020, CXR at that time clear.  CT of the sinuses showed mucosal thickening and mucous retention cyst/polyps within the maxillary sinuses.    Current regimen: Symbicort 160.  Did not tolerate Breo.  Overall feels some improvement in symptoms with Symbicort.  Using rescue inhaler 1-2x/month.  Also prescribed Singulair but has not yet started.    Exacerbations within the past 12 months: 2    Past Medical History:   Diagnosis Date   • Allergic rhinitis    • Anesthesia     \"severe nausea\"   • Angioma, venous    • Asthma in adult     mild   • Back pain    • Breath shortness    • Chickenpox    • Cough    • Daytime sleepiness    • Epilepsy (HCC)    • Heart burn    • Hypothyroidism    • Kidney stone    • Nasal drainage    • Pain 05-09-12    neck to left wrist, 4/10   • Pain 5/27/2014    right shoulder   • " "Painful joint    • Rhinitis    • Seizure (HCC)     \"simple, partial\", last seizure 2013   • Shortness of breath    • Sleep apnea    • Sore muscles    • Sore throat, chronic    • Unspecified disorder of thyroid     hypothyroid   • Wears glasses    • Wheezing        Past Surgical History:   Procedure Laterality Date   • SHOULDER DECOMPRESSION ARTHROSCOPIC  2014    Performed by Mendel Guzman M.D. at SURGERY [facility]   • CLAVICLE DISTAL EXCISION  2014    Performed by Mendel Guzman M.D. at SURGERY [facility]   • SHOULDER ARTHROSCOPY W/ ROTATOR CUFF REPAIR  2014    Performed by Mendel Guzman M.D. at SURGERY [facility]   • NERVE ULNAR TRANSFER  2012    Performed by ROXY HAMILTON at SURGERY SAME DAY [facility]   • EPICONDYLAR STRIPPING  2012    Performed by ROXY HAMILTON at SURGERY SAME DAY [facility]   • DILATION AND CURETTAGE  2005    [facility]   • PRIMARY C SECTION     • WRIST ARTHROSCOPY      left wrist reconstruction   • SHOULDER ARTHROSCOPY      left acromioplasty       Social History     Socioeconomic History   • Marital status:      Spouse name: Not on file   • Number of children: Not on file   • Years of education: Not on file   • Highest education level: Not on file   Occupational History   • Not on file   Social Needs   • Financial resource strain: Not on file   • Food insecurity     Worry: Not on file     Inability: Not on file   • Transportation needs     Medical: Not on file     Non-medical: Not on file   Tobacco Use   • Smoking status: Former Smoker     Packs/day: 0.25     Years: 5.00     Pack years: 1.25     Types: Cigarettes     Quit date: 2000     Years since quittin.9   • Smokeless tobacco: Never Used   Substance and Sexual Activity   • Alcohol use: Yes     Comment: 1 per week   • Drug use: No   • Sexual activity: Not on file   Lifestyle   • Physical activity     Days per week: Not on file     Minutes per session: Not on " file   • Stress: Not on file   Relationships   • Social connections     Talks on phone: Not on file     Gets together: Not on file     Attends Voodoo service: Not on file     Active member of club or organization: Not on file     Attends meetings of clubs or organizations: Not on file     Relationship status: Not on file   • Intimate partner violence     Fear of current or ex partner: Not on file     Emotionally abused: Not on file     Physically abused: Not on file     Forced sexual activity: Not on file   Other Topics Concern   • Not on file   Social History Narrative   • Not on file        Musician, violinist  Plays for the [employer]    Family History   Problem Relation Age of Onset   • Asthma Mother    • Other Mother         A Fib   • Other Father         Parkinsons   • Hypertension Other      Current Outpatient Medications on File Prior to Visit   Medication Sig Dispense Refill   • SYNTHROID 88 MCG Tab Take 88 mcg by mouth Every morning on an empty stomach.     • SYMBICORT 160-4.5 MCG/ACT Aerosol Inhale 2 Puffs every day.     • fluticasone (FLONASE) 50 MCG/ACT nasal spray Administer 1 Spray into affected nostril(S) every day.     • LamoTRIgine 250 MG TABLET SR 24 HR TAKE 1 TABLET BY MOUTH EVERY MORNING 90 Tab 1   • PROAIR  (90 Base) MCG/ACT Aero Soln inhalation aerosol INHALE 2 PUFFS BY MOUTH EVERY FOUR HOURS AS NEEDED FOR SHORTNESS OF BREATH (WHEEZING). 1 Inhaler 1   • Probiotic Product (PROBIOTIC PO) Take  by mouth every day.     • Flaxseed, Linseed, (FLAXSEED OIL PO) Take  by mouth every bedtime. Unsure of dose      • Coenzyme Q10 (CO Q 10 PO) Take  by mouth every bedtime. Unsure of dose      • Multiple Vitamin (MULTIVITAMIN PO) Take  by mouth every bedtime.     • levothyroxine (SYNTHROID) 75 MCG TABS Take 75 mcg by mouth every day.       No current facility-administered medications on file prior to visit.      Allergies: Patient has no known allergies.    ROS:   Review of Systems  "  Respiratory: Positive for cough, sputum production, shortness of breath and wheezing. Negative for hemoptysis.    All other systems reviewed and are negative.    Vitals:  /84 (BP Location: Left arm, Patient Position: Sitting, BP Cuff Size: Adult)   Pulse 82   Ht 1.727 m (5' 8\")   Wt 93.9 kg (207 lb)   SpO2 95%     Physical Exam:  Physical Exam  Vitals signs and nursing note reviewed.   Constitutional:       General: She is not in acute distress.     Appearance: Normal appearance. She is well-developed. She is not diaphoretic.   Eyes:      General: No scleral icterus.        Right eye: No discharge.         Left eye: No discharge.      Conjunctiva/sclera: Conjunctivae normal.      Pupils: Pupils are equal, round, and reactive to light.   Neck:      Thyroid: No thyromegaly.      Vascular: No JVD.   Cardiovascular:      Rate and Rhythm: Normal rate and regular rhythm.      Heart sounds: Normal heart sounds. No murmur. No gallop.    Pulmonary:      Effort: Pulmonary effort is normal. No respiratory distress.      Breath sounds: Normal breath sounds. No wheezing or rales.   Abdominal:      General: There is no distension.      Palpations: Abdomen is soft.      Tenderness: There is no abdominal tenderness. There is no guarding.   Musculoskeletal:         General: No tenderness.   Lymphadenopathy:      Cervical: No cervical adenopathy.   Skin:     General: Skin is warm.      Capillary Refill: Capillary refill takes less than 2 seconds.      Findings: No erythema or rash.   Neurological:      Mental Status: She is alert and oriented to person, place, and time.      Cranial Nerves: No cranial nerve deficit.      Sensory: No sensory deficit.      Motor: No abnormal muscle tone.   Psychiatric:         Behavior: Behavior normal.         Laboratory Data:    PFTs as reviewed by me personally show:  None for review at time of consultation    Imaging as reviewed by me personally show:    CXR 6/2020 reviewed, " unremarkable    Assessment/Plan:    Problem List Items Addressed This Visit     Moderate persistent asthma not dependent on systemic steroids     Symbicort 160/Singulair  Rescue use 1-2x/month, no nighttime awakenings  Comorbidities: chronic rhinosinusitis, mild obesity, GAIL    Plan:  -Continue current inhaler regimen, currently working, rare ELIER use  -Counseled about sinus hygiene regimen  -PFTs ordered  -Requesting referral to allergist  -Pneumovax next visit (none available today in clinic)         Relevant Medications    SYMBICORT 160-4.5 MCG/ACT Aerosol    Chronic maxillary sinusitis     CT sinus 11/2020: mucosal thickening and mucous retention cyst/polyps within the maxillary sinuses.    - saline nasal rinses BID followed by flonase  - allergy         GAIL (obstructive sleep apnea)     GAIL (PSG 2016 AHI 15.5), on CPAP and followed in the sleep clinic but last seen 2017.         BMI 31.0-31.9,adult     Comorbidity associated with difficult to control asthma  Discussed caloric intake control, intermittent fasting, exercise strategies              Return in about 3 months (around 3/2/2021).     This note was generated using voice recognition software which has a chance of producing errors of grammar and possibly content.  I have made every reasonable attempt to find and correct any obvious errors, but it should be expected that some may not be found prior to finalization of this note.  __________  Harshad Aguirre MD  Pulmonary and Critical Care Medicine  [facility]

## 2020-12-03 NOTE — ASSESSMENT & PLAN NOTE
Comorbidity associated with difficult to control asthma  Discussed caloric intake control, intermittent fasting, exercise strategies

## 2020-12-03 NOTE — ASSESSMENT & PLAN NOTE
Symbicort 160/Singulair  Rescue use 1-2x/month, no nighttime awakenings  Comorbidities: chronic rhinosinusitis, mild obesity, GAIL    Plan:  -Continue current inhaler regimen, currently working, rare ELIER use  -Counseled about sinus hygiene regimen  -PFTs ordered  -Requesting referral to allergist  -Pneumovax next visit (none available today in clinic)

## 2020-12-03 NOTE — ASSESSMENT & PLAN NOTE
CT sinus 11/2020: mucosal thickening and mucous retention cyst/polyps within the maxillary sinuses.    - saline nasal rinses BID followed by flonase  - allergy

## 2020-12-09 ENCOUNTER — HOSPITAL ENCOUNTER (OUTPATIENT)
Dept: LAB | Facility: MEDICAL CENTER | Age: 50
End: 2020-12-09
Payer: COMMERCIAL

## 2020-12-17 ENCOUNTER — HOSPITAL ENCOUNTER (OUTPATIENT)
Dept: LAB | Facility: MEDICAL CENTER | Age: 50
End: 2020-12-17
Payer: COMMERCIAL

## 2020-12-17 LAB — COVID ORDER STATUS COVID19: NORMAL

## 2020-12-18 LAB
SARS-COV-2 RNA RESP QL NAA+PROBE: NOTDETECTED
SPECIMEN SOURCE: NORMAL

## 2020-12-24 ENCOUNTER — HOSPITAL ENCOUNTER (OUTPATIENT)
Dept: LAB | Facility: MEDICAL CENTER | Age: 50
End: 2020-12-24
Payer: COMMERCIAL

## 2020-12-24 LAB — COVID ORDER STATUS COVID19: NORMAL

## 2020-12-26 LAB
SARS-COV-2 RNA RESP QL NAA+PROBE: NOTDETECTED
SPECIMEN SOURCE: NORMAL

## 2020-12-28 DIAGNOSIS — Z23 NEED FOR VACCINATION: ICD-10-CM

## 2020-12-29 ENCOUNTER — IMMUNIZATION (OUTPATIENT)
Dept: FAMILY PLANNING/WOMEN'S HEALTH CLINIC | Facility: IMMUNIZATION CENTER | Age: 50
End: 2020-12-29
Attending: FAMILY MEDICINE
Payer: COMMERCIAL

## 2020-12-29 DIAGNOSIS — Z23 ENCOUNTER FOR VACCINATION: Primary | ICD-10-CM

## 2020-12-29 DIAGNOSIS — Z23 NEED FOR VACCINATION: ICD-10-CM

## 2020-12-29 PROCEDURE — 0011A MODERNA SARS-COV-2 VACCINE: CPT

## 2020-12-29 PROCEDURE — 91301 MODERNA SARS-COV-2 VACCINE: CPT

## 2021-01-04 ENCOUNTER — HOSPITAL ENCOUNTER (OUTPATIENT)
Dept: LAB | Facility: MEDICAL CENTER | Age: 51
End: 2021-01-04
Payer: COMMERCIAL

## 2021-01-14 ENCOUNTER — HOSPITAL ENCOUNTER (OUTPATIENT)
Dept: LAB | Facility: MEDICAL CENTER | Age: 51
End: 2021-01-14
Payer: COMMERCIAL

## 2021-01-30 PROCEDURE — 91301 MODERNA SARS-COV-2 VACCINE: CPT

## 2021-01-30 PROCEDURE — 0012A MODERNA SARS-COV-2 VACCINE: CPT

## 2021-01-31 ENCOUNTER — IMMUNIZATION (OUTPATIENT)
Dept: FAMILY PLANNING/WOMEN'S HEALTH CLINIC | Facility: IMMUNIZATION CENTER | Age: 51
End: 2021-01-31
Payer: COMMERCIAL

## 2021-01-31 DIAGNOSIS — Z23 ENCOUNTER FOR VACCINATION: Primary | ICD-10-CM

## 2021-03-01 ENCOUNTER — NON-PROVIDER VISIT (OUTPATIENT)
Dept: SLEEP MEDICINE | Facility: MEDICAL CENTER | Age: 51
End: 2021-03-01
Attending: INTERNAL MEDICINE
Payer: COMMERCIAL

## 2021-03-01 VITALS — WEIGHT: 206 LBS | HEIGHT: 66 IN | BODY MASS INDEX: 33.11 KG/M2

## 2021-03-01 PROCEDURE — 94726 PLETHYSMOGRAPHY LUNG VOLUMES: CPT | Performed by: INTERNAL MEDICINE

## 2021-03-01 PROCEDURE — 94729 DIFFUSING CAPACITY: CPT | Performed by: INTERNAL MEDICINE

## 2021-03-01 PROCEDURE — 94060 EVALUATION OF WHEEZING: CPT | Performed by: INTERNAL MEDICINE

## 2021-03-01 ASSESSMENT — PULMONARY FUNCTION TESTS
FEV1/FVC_PERCENT_CHANGE: -50
FEV1/FVC_PERCENT_PREDICTED: 102
FEV1/FVC: 85.33
FVC_PERCENT_PREDICTED: 81
FVC_LLN: 3.07
FEV1/FVC_PERCENT_LLN: 67
FEV1/FVC_PERCENT_PREDICTED: 80
FEV1_PERCENT_CHANGE: -2
FEV1_PERCENT_PREDICTED: 86
FVC: 3.08
FEV1/FVC: 82
FEV1_PREDICTED: 2.93
FEV1/FVC_PERCENT_CHANGE: 4
FEV1_PERCENT_PREDICTED: 87
FEV1/FVC_PREDICTED: 80
FEV1: 2.56
FEV1/FVC: 82
FEV1/FVC_PERCENT_PREDICTED: 106
FEV1/FVC_PERCENT_PREDICTED: 107
FVC_PREDICTED: 3.67
FEV1/FVC_PERCENT_PREDICTED: 104
FEV1_LLN: 2.44
FVC: 3
FEV1_PERCENT_CHANGE: 1
FEV1/FVC: 85
FVC_PERCENT_PREDICTED: 83
FEV1: 2.53

## 2021-03-01 ASSESSMENT — FIBROSIS 4 INDEX: FIB4 SCORE: 0.66

## 2021-03-01 NOTE — PROCEDURES
Technician: JIMMIE Tripp    Technician Comment:  Good patient effort & cooperation.  The results of this test meet the ATS/ERS standards for acceptability & reproducibility.  Test was performed on the [employer] [facility] Body Plethysmograph-Elite DX system.  Predicted values were GLI-2012 for spirometry, GLI-2017 for DLCO, ITS for Lung Volumes.  The DLCO was uncorrected for Hgb.  A bronchodilator of Ventolin HFA -2puffs via spacer administered.  DLCO performed during dilation period.    Interpretation:  1.  Baseline spirometry shows normal airflows.  2.  No significant bronchodilator response.  3.  Lung volumes are within normal limits other than ERV which may be due to patient body habitus.  4.  Diffusion capacity is at the upper limits of normal at 120% predicted.  Normal pulmonary function testing with normal flow volume loop.  This does not rule out reactive airways disease.  Suggest clinical correlation.

## 2021-03-11 ENCOUNTER — HOSPITAL ENCOUNTER (OUTPATIENT)
Dept: LAB | Facility: MEDICAL CENTER | Age: 51
End: 2021-03-11
Payer: COMMERCIAL

## 2021-03-23 ENCOUNTER — OFFICE VISIT (OUTPATIENT)
Dept: SLEEP MEDICINE | Facility: MEDICAL CENTER | Age: 51
End: 2021-03-23
Payer: COMMERCIAL

## 2021-03-23 VITALS
WEIGHT: 205.38 LBS | BODY MASS INDEX: 33.01 KG/M2 | DIASTOLIC BLOOD PRESSURE: 84 MMHG | OXYGEN SATURATION: 100 % | SYSTOLIC BLOOD PRESSURE: 126 MMHG | HEIGHT: 66 IN | HEART RATE: 85 BPM

## 2021-03-23 DIAGNOSIS — J45.40 MODERATE PERSISTENT ASTHMA NOT DEPENDENT ON SYSTEMIC STEROIDS: ICD-10-CM

## 2021-03-23 DIAGNOSIS — J45.40 MODERATE PERSISTENT ASTHMA IN ADULT WITHOUT COMPLICATION: ICD-10-CM

## 2021-03-23 DIAGNOSIS — J32.0 CHRONIC MAXILLARY SINUSITIS: ICD-10-CM

## 2021-03-23 DIAGNOSIS — G47.33 OSA (OBSTRUCTIVE SLEEP APNEA): ICD-10-CM

## 2021-03-23 DIAGNOSIS — Z23 NEED FOR VACCINATION: ICD-10-CM

## 2021-03-23 PROCEDURE — 90471 IMMUNIZATION ADMIN: CPT | Performed by: INTERNAL MEDICINE

## 2021-03-23 PROCEDURE — 99214 OFFICE O/P EST MOD 30 MIN: CPT | Mod: 25 | Performed by: INTERNAL MEDICINE

## 2021-03-23 PROCEDURE — 90732 PPSV23 VACC 2 YRS+ SUBQ/IM: CPT | Performed by: INTERNAL MEDICINE

## 2021-03-23 RX ORDER — BUDESONIDE AND FORMOTEROL FUMARATE DIHYDRATE 160; 4.5 UG/1; UG/1
2 AEROSOL RESPIRATORY (INHALATION) 2 TIMES DAILY
Qty: 1 EACH | Refills: 11 | Status: SHIPPED | OUTPATIENT
Start: 2021-03-23 | End: 2022-05-04 | Stop reason: SDUPTHER

## 2021-03-23 ASSESSMENT — ENCOUNTER SYMPTOMS
SENSORY CHANGE: 0
SPUTUM PRODUCTION: 0
SINUS PAIN: 0
DIARRHEA: 0
EYE DISCHARGE: 0
ABDOMINAL PAIN: 0
COUGH: 0
CHILLS: 0
MYALGIAS: 0
DIZZINESS: 0
PSYCHIATRIC NEGATIVE: 1
VOMITING: 0
WEIGHT LOSS: 0
NAUSEA: 0
SHORTNESS OF BREATH: 0
ORTHOPNEA: 0
STRIDOR: 0
FOCAL WEAKNESS: 0
HEADACHES: 0
WHEEZING: 0
LOSS OF CONSCIOUSNESS: 0
SORE THROAT: 0
FEVER: 0
EYE PAIN: 0

## 2021-03-23 ASSESSMENT — FIBROSIS 4 INDEX: FIB4 SCORE: 0.66

## 2021-03-23 NOTE — ASSESSMENT & PLAN NOTE
CT sinus 11/2020: mucosal thickening and mucous retention cyst/polyps within the maxillary sinuses.     - saline nasal rinses BID followed by flonase  - f/u ENT, allergy

## 2021-03-23 NOTE — PROGRESS NOTES
"Pulmonary Clinic Note    Chief Complaint:  Chief Complaint   Patient presents with   • Asthma     Last seen 12/02/20   • Results     PFT 03/01/21, Allergy OV 01/15/21     HPI:   Mir Cisneros is a very pleasant 50 y.o. female distant former tobacco smoker 2 pack years, quit 1995 who returns to the clinic for follow-up of moderate persistent asthma.  Initial consultation in 12/2020.   She has a history of chronic sinusitis, focal epilepsy, and GAIL (PSG 2016 AHI 15.5), on CPAP last seen in sleep clinic 2017.     Mir has had mild intermittent asthma since college, very mild and well-controlled rare use of rescue inhaler.  Her symptoms in the past have been exacerbated by stress and seasonal allergies. In the last 12 months, symptoms have been worsening with cough and shortness of breath. No new changes in home/living/work life, although she is working more from home now due to the coronavirus pandemic. Had an ER visit in 4/2020 and 6/2020, CXR at that time clear.  CT of the sinuses showed mucosal thickening and mucous retention cyst/polyps within the maxillary sinuses.     Current regimen: Symbicort 160.  Did not tolerate Breo.  Overall feels some improvement in symptoms with Symbicort.  Using rescue inhaler 1-2x/month.  Also prescribed Singulair but has not yet started.     Exacerbations within the past 12 months: 2    Interval events since last visit in 12/2020:  Compliant with Symbicort 160, never started Singulair.  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, which has created work related stress    PFT: FEV1 2.53 86%, proportional reduction in forced volumes, no obstruction or BD response, low ERV    Referred to allergy last visit-recommended antihistamine prior to spring season    Weight stable @ 205#    Past Medical History:   Diagnosis Date   • Allergic rhinitis    • Anesthesia     \"severe nausea\"   • " "Angioma, venous    • Asthma in adult     mild   • Back pain    • Breath shortness    • Chickenpox    • Cough    • Daytime sleepiness    • Epilepsy ([facility])    • Heart burn    • Hypothyroidism    • Kidney stone    • Nasal drainage    • Pain 12    neck to left wrist, 4/10   • Pain 2014    right shoulder   • Painful joint    • Rhinitis    • Seizure ([facility])     \"simple, partial\", last seizure 2013   • Shortness of breath    • Sleep apnea    • Sore muscles    • Sore throat, chronic    • Unspecified disorder of thyroid     hypothyroid   • Wears glasses    • Wheezing        Past Surgical History:   Procedure Laterality Date   • SHOULDER DECOMPRESSION ARTHROSCOPIC  2014    Performed by Mendel Guzman M.D. at SURGERY [facility]   • CLAVICLE DISTAL EXCISION  2014    Performed by Mendel Guzman M.D. at SURGERY [facility]   • SHOULDER ARTHROSCOPY W/ ROTATOR CUFF REPAIR  2014    Performed by Mendel Guzman M.D. at SURGERY [facility]   • NERVE ULNAR TRANSFER  2012    Performed by ROXY HAMILTON at SURGERY SAME DAY [facility]   • EPICONDYLAR STRIPPING  2012    Performed by ROXY HAMILTON at SURGERY SAME DAY [facility]   • DILATION AND CURETTAGE  2005    D & C   • PRIMARY C SECTION     • WRIST ARTHROSCOPY      left wrist reconstruction   • SHOULDER ARTHROSCOPY      left acromioplasty       Social History     Socioeconomic History   • Marital status:      Spouse name: Not on file   • Number of children: Not on file   • Years of education: Not on file   • Highest education level: Not on file   Occupational History   • Not on file   Tobacco Use   • Smoking status: Former Smoker     Packs/day: 0.25     Years: 5.00     Pack years: 1.25     Types: Cigarettes     Quit date:      Years since quittin.2   • Smokeless tobacco: Never Used   Substance and Sexual Activity   • Alcohol use: Yes     Comment: 1 per week   • Drug use: No   • Sexual activity: " Not on file   Other Topics Concern   • Not on file   Social History Narrative   • Not on file     Social Determinants of Health     Financial Resource Strain:    • Difficulty of Paying Living Expenses:    Food Insecurity:    • Worried About Running Out of Food in the Last Year:    • Ran Out of Food in the Last Year:    Transportation Needs:    • Lack of Transportation (Medical):    • Lack of Transportation (Non-Medical):    Physical Activity:    • Days of Exercise per Week:    • Minutes of Exercise per Session:    Stress:    • Feeling of Stress :    Social Connections:    • Frequency of Communication with Friends and Family:    • Frequency of Social Gatherings with Friends and Family:    • Attends Baptism Services:    • Active Member of Clubs or Organizations:    • Attends Club or Organization Meetings:    • Marital Status:    Intimate Partner Violence:    • Fear of Current or Ex-Partner:    • Emotionally Abused:    • Physically Abused:    • Sexually Abused:           Family History   Problem Relation Age of Onset   • Asthma Mother    • Other Mother         A Fib   • Other Father         Parkinsons   • Hypertension Other        Current Outpatient Medications on File Prior to Visit   Medication Sig Dispense Refill   • SYNTHROID 88 MCG Tab Take 88 mcg by mouth Every morning on an empty stomach.     • fluticasone (FLONASE) 50 MCG/ACT nasal spray Administer 1 Spray into affected nostril(S) every day.     • LamoTRIgine 250 MG TABLET SR 24 HR TAKE 1 TABLET BY MOUTH EVERY MORNING 90 Tab 1   • PROAIR  (90 Base) MCG/ACT Aero Soln inhalation aerosol INHALE 2 PUFFS BY MOUTH EVERY FOUR HOURS AS NEEDED FOR SHORTNESS OF BREATH (WHEEZING). 1 Inhaler 1   • levothyroxine (SYNTHROID) 75 MCG TABS Take 75 mcg by mouth every day.     • Probiotic Product (PROBIOTIC PO) Take  by mouth every day.     • Flaxseed, Linseed, (FLAXSEED OIL PO) Take  by mouth every bedtime. Unsure of dose      • Coenzyme Q10 (CO Q 10 PO) Take  by mouth  "every bedtime. Unsure of dose      • Multiple Vitamin (MULTIVITAMIN PO) Take  by mouth every bedtime.       No current facility-administered medications on file prior to visit.       Allergies: Patient has no known allergies.      ROS:   Review of Systems   Constitutional: Negative for chills, fever and weight loss.   HENT: Negative for congestion, sinus pain and sore throat.    Eyes: Negative for pain and discharge.   Respiratory: Negative for cough, sputum production, shortness of breath, wheezing and stridor.    Cardiovascular: Negative for chest pain, orthopnea and leg swelling.   Gastrointestinal: Negative for abdominal pain, diarrhea, nausea and vomiting.   Genitourinary: Negative for dysuria, frequency and urgency.   Musculoskeletal: Negative for myalgias.   Skin: Negative for rash.   Neurological: Negative for dizziness, sensory change, focal weakness, loss of consciousness and headaches.   Psychiatric/Behavioral: Negative.         Stress   All other systems reviewed and are negative.    Vitals:  /84 (BP Location: Left arm, Patient Position: Sitting, BP Cuff Size: Adult)   Pulse 85   Ht 1.676 m (5' 6\")   Wt 93.2 kg (205 lb 6 oz)   SpO2 100%     Physical Exam:  Physical Exam  Vitals and nursing note reviewed.   Constitutional:       General: She is not in acute distress.     Appearance: Normal appearance. She is well-developed. She is obese. She is not ill-appearing or diaphoretic.      Comments: Very pleasant   Eyes:      General: No scleral icterus.        Right eye: No discharge.         Left eye: No discharge.      Conjunctiva/sclera: Conjunctivae normal.      Pupils: Pupils are equal, round, and reactive to light.   Neck:      Thyroid: No thyromegaly.      Vascular: No JVD.   Cardiovascular:      Rate and Rhythm: Normal rate and regular rhythm.      Heart sounds: Normal heart sounds. No murmur. No gallop.    Pulmonary:      Effort: Pulmonary effort is normal. No respiratory distress.      " Breath sounds: Normal breath sounds. No wheezing or rales.   Abdominal:      General: There is no distension.      Palpations: Abdomen is soft.      Tenderness: There is no abdominal tenderness. There is no guarding.   Musculoskeletal:         General: No tenderness.   Lymphadenopathy:      Cervical: No cervical adenopathy.   Skin:     General: Skin is warm.      Capillary Refill: Capillary refill takes less than 2 seconds.      Findings: No erythema or rash.   Neurological:      Mental Status: She is alert and oriented to person, place, and time.      Cranial Nerves: No cranial nerve deficit.      Sensory: No sensory deficit.      Motor: No abnormal muscle tone.   Psychiatric:         Behavior: Behavior normal.       Laboratory Data:    PFTs as reviewed by me personally show:  3/2021: FEV1 2.53 86%, proportional reduction in forced volumes, no obstruction or BD response, low ERV    Imaging as reviewed by me personally show:    CXR 6/2020 reviewed, unremarkable    Assessment/Plan:    Problem List Items Addressed This Visit     Moderate persistent asthma not dependent on systemic steroids     Symbicort 160  Rescue use 1-2x/month, no nighttime awakenings  Comorbidities: chronic rhinosinusitis, mild obesity, GAIL     Plan:  -Continue current inhaler regimen, currently working, rare ELIER use  -Cont sinus hygiene regimen + allegra  -repeat PFTs 3/2022  -f/u ENT, allergy  -Pneumovax today, completed COVID vacc series 12/2020.         Relevant Medications    SYMBICORT 160-4.5 MCG/ACT Aerosol    Other Relevant Orders    Pneumococal Polysaccharide Vaccine 23-Valent =>3YO SQ/IM (Completed)    PULMONARY FUNCTION TESTS -Test requested: Complete Pulmonary Function Test    Chronic maxillary sinusitis     CT sinus 11/2020: mucosal thickening and mucous retention cyst/polyps within the maxillary sinuses.     - saline nasal rinses BID followed by flonase  - f/u ENT, allergy         GAIL (obstructive sleep apnea)     GAIL (PSG 2016 AHI  15.5), on CPAP and followed in the sleep clinic but last seen 2017.  - referral placed to reestablish care         Relevant Orders    REFERRAL TO PULMONARY AND SLEEP MEDICINE    BMI 31.0-31.9,adult     Comorbidity associated with difficult to control asthma  Discussed caloric intake control, intermittent fasting, exercise strategies           Other Visit Diagnoses     Moderate persistent asthma in adult without complication        Relevant Medications    SYMBICORT 160-4.5 MCG/ACT Aerosol    Need for vaccination            Return in about 6 months (around 9/23/2021).     This note was generated using voice recognition software which has a chance of producing errors of grammar and possibly content.  I have made every reasonable attempt to find and correct any obvious errors, but it should be expected that some may not be found prior to finalization of this note.  __________  Harshad Aguirre MD  Pulmonary and Critical Care Medicine  [facility]

## 2021-03-23 NOTE — ASSESSMENT & PLAN NOTE
GAIL (PSG 2016 AHI 15.5), on CPAP and followed in the sleep clinic but last seen 2017.  - referral placed to reestablish care

## 2021-03-23 NOTE — ASSESSMENT & PLAN NOTE
Symbicort 160  Rescue use 1-2x/month, no nighttime awakenings  Comorbidities: chronic rhinosinusitis, mild obesity, GAIL     Plan:  -Continue current inhaler regimen, currently working, rare ELIER use  -Cont sinus hygiene regimen + allegra  -repeat PFTs 3/2022  -f/u ENT, allergy  -Pneumovax today, completed COVID vacc series 12/2020.

## 2021-03-26 ENCOUNTER — HOSPITAL ENCOUNTER (OUTPATIENT)
Dept: LAB | Facility: MEDICAL CENTER | Age: 51
End: 2021-03-26
Payer: COMMERCIAL

## 2021-03-26 PROCEDURE — U0005 INFEC AGEN DETEC AMPLI PROBE: HCPCS

## 2021-03-26 PROCEDURE — U0003 INFECTIOUS AGENT DETECTION BY NUCLEIC ACID (DNA OR RNA); SEVERE ACUTE RESPIRATORY SYNDROME CORONAVIRUS 2 (SARS-COV-2) (CORONAVIRUS DISEASE [COVID-19]), AMPLIFIED PROBE TECHNIQUE, MAKING USE OF HIGH THROUGHPUT TECHNOLOGIES AS DESCRIBED BY CMS-2020-01-R: HCPCS

## 2021-06-04 ENCOUNTER — OFFICE VISIT (OUTPATIENT)
Dept: URGENT CARE | Facility: PHYSICIAN GROUP | Age: 51
End: 2021-06-04
Payer: COMMERCIAL

## 2021-06-04 VITALS
HEART RATE: 101 BPM | TEMPERATURE: 98.5 F | OXYGEN SATURATION: 98 % | HEIGHT: 67 IN | WEIGHT: 206 LBS | SYSTOLIC BLOOD PRESSURE: 154 MMHG | DIASTOLIC BLOOD PRESSURE: 94 MMHG | BODY MASS INDEX: 32.33 KG/M2 | RESPIRATION RATE: 16 BRPM

## 2021-06-04 DIAGNOSIS — N61.0 MASTITIS, LEFT, ACUTE: ICD-10-CM

## 2021-06-04 PROCEDURE — 99213 OFFICE O/P EST LOW 20 MIN: CPT | Performed by: FAMILY MEDICINE

## 2021-06-04 RX ORDER — CEPHALEXIN 500 MG/1
500 CAPSULE ORAL 3 TIMES DAILY
Qty: 30 CAPSULE | Refills: 0 | Status: SHIPPED | OUTPATIENT
Start: 2021-06-04 | End: 2021-06-14

## 2021-06-04 ASSESSMENT — FIBROSIS 4 INDEX: FIB4 SCORE: 0.66

## 2021-06-04 NOTE — PROGRESS NOTES
"Subjective:      Mir Cisneros is a 50 y.o. female who presents with Breast Pain (L breast pain worsening)            50-year-old presenting for evaluation of left breast tissue pain for the past couple of days.  Also noticed mild reddish hue around the left breast as well and had tenderness on palpation of the areolar area as well.  No discharge reported.  She denies any fever chills.  No injury.  She denies any chest pain per se.  Review of system otherwise negative.  She is leaving for [place] for vacation in a week.      ROS       Objective:     /94 (BP Location: Left arm, Patient Position: Sitting, BP Cuff Size: Large adult)   Pulse (!) 101   Temp 36.9 °C (98.5 °F) (Temporal)   Resp 16   Ht 1.702 m (5' 7\")   Wt 93.4 kg (206 lb)   SpO2 98%   BMI 32.26 kg/m²      Physical Exam  Constitutional:       General: She is not in acute distress.     Appearance: She is not ill-appearing, toxic-appearing or diaphoretic.   HENT:      Head: Normocephalic and atraumatic.   Cardiovascular:      Rate and Rhythm: Normal rate.   Pulmonary:      Effort: Pulmonary effort is normal. No respiratory distress.      Breath sounds: No stridor.   Chest:      Chest wall: No mass, lacerations, deformity, swelling, crepitus or edema.      Breasts: Breasts are symmetrical.         Right: No swelling, bleeding, inverted nipple, mass or nipple discharge.         Left: Tenderness present. No swelling, bleeding, inverted nipple, mass or nipple discharge.          Comments: Mild reddish hue noted in the outlined area.  No nodules or masses felt on the left breast as compared to the right.  Palpation of the areola causes some discomfort but no discharge noted.  Skin:     General: Skin is warm.      Coloration: Skin is not jaundiced or pale.   Neurological:      Mental Status: She is alert and oriented to person, place, and time.   Psychiatric:         Thought Content: Thought content normal.              Assessment/Plan:       " ASSESSMENT:PLAN:  1. Mastitis, left, acute  - cephALEXin (KEFLEX) 500 MG Cap; Take 1 capsule by mouth 3 times a day for 10 days.  Dispense: 30 capsule; Refill: 0      Continue symptomatic care  Plan per orders and instructions  Warning signs reviewed  Follow up if not significantly improved as expected in 2-3 days, sooner if any worsening or new symptoms

## 2021-06-04 NOTE — PATIENT INSTRUCTIONS
Follow up if not significantly improved as expected in 2-3 days, sooner if any worsening or new symptoms      Mastitis    Mastitis is irritation and swelling (inflammation) in an area of the breast. It is often caused by an infection that occurs when germs (bacteria) enter the skin. This most often happens to breastfeeding mothers, but it can happen to other women too as well as some men.  Follow these instructions at home:  Medicines  · Take over-the-counter and prescription medicines only as told by your doctor.  · If you were prescribed an antibiotic medicine, take it as told by your doctor. Do not stop taking it even if you start to feel better.  General instructions  · Do not wear a tight or underwire bra. Wear a soft support bra.  · Drink more fluids, especially if you have a fever.  · Get plenty of rest.  If you are breastfeeding:    · Keep emptying your breasts by breastfeeding or by using a breast pump.  · Keep your nipples clean and dry.  · During breastfeeding, empty the first breast before going to the other breast. Use a breast pump if your baby is not emptying your breasts.  · Massage your breasts during feeding or pumping as told by your doctor.  · If told, put moist heat on the affected area of your breast right before breastfeeding or pumping. Use the heat source that your doctor tells you to use.  · If told, put ice on the affected area of your breast right after breastfeeding or pumping:  ? Put ice in a plastic bag.  ? Place a towel between your skin and the bag.  ? Leave the ice on for 20 minutes.  · If you go back to work, pump your breasts while at work.  · Avoid letting your breasts get overly filled with milk (engorged).  Contact a doctor if:  · You have pus-like fluid leaking from your breast.  · You have a fever.  · Your symptoms do not get better within 2 days.  Get help right away if:  · Your pain and swelling are getting worse.  · Your pain is not helped by medicine.  · You have a red  line going from your breast toward your armpit.  Summary  · Mastitis is irritation and swelling in an area of the breast.  · If you were prescribed an antibiotic medicine, do not stop taking it even if you start to feel better.  · Drink more fluids and get plenty of rest.  · Contact a doctor if your symptoms do not get better within 2 days.  This information is not intended to replace advice given to you by your health care provider. Make sure you discuss any questions you have with your health care provider.  Document Released: 12/06/2010 Document Revised: 11/30/2018 Document Reviewed: 01/09/2018  [employer]vier Patient Education © 2020 [employer]

## 2021-06-10 ENCOUNTER — HOSPITAL ENCOUNTER (OUTPATIENT)
Dept: LAB | Facility: MEDICAL CENTER | Age: 51
End: 2021-06-10
Payer: COMMERCIAL

## 2021-07-20 NOTE — PROGRESS NOTES
"Follow-up visit    Patient: Mir Cisneros  : 1970    Referring Physician: No ref. provider found   Janet Diaz M.D.    CC: seizures    IMPRESSION:    1. Seizure type and frequency of seizures- presumed focal seizure aware   Last  seizure   2. Etiology/Syndrome- unknown but likely not related to MRI finding   3. EEG findings- normal   4. Brain imaging MRI brain   Right frontal venous angioma/developmental venous anomaly again seen. No evidence of interval hemorrhage or hemosiderin deposition. No change from prior exam.    5. Antiepileptic drug side effects and counseling done     6. Surgery consideration vns done   7. Safety issues counseling done   8. Reproductive, contraception, pregnancy discussion done, advised compliance      PLAN:  1. Seizure (HCC)  - LAMOTRIGINE; Future  - LamoTRIgine 250 MG TABLET SR 24 HR; TAKE 1 TABLET BY MOUTH EVERY MORNING  Dispense: 90 tablet; Refill: 0    2. Management options were discussed with patient. She is interested in lowering LTG if possible, will check level and adjust accordingly   3. No medication change is indicated at this time.   4. Seizure precautions were discussed in detail with patient: last seizure   5. Return in 6 months or sooner if needed.     HISTORY:    I had the opportunity to see Ms. Mir Cisneros in the epilepsy clinic on 2021 for follow up on seizure disorder. she came alone.  she was last seen by Cici in .     Dominant hand: right handed     In brief, her pertinent medical history is remarkable for stable right frontal venous angioma/developmental venous anomaly with most recent MRI in .     Seizure type 1 - focal seizure aware     The onset of seizure was , she was sitting on the stool and sudden onset room spinning and fell off, had difficulty speaking.   She also developed bad headache at that time.   Then she started to have \"simple partial seizure\"  The ictal behavior was stereotyped and " described as closed in and dizzy sensation for 30 minutes, fatigue afterwards. No LOC.  Frequency: she was having fairly frequent on weekly basis.  The last seizure was in 2018     Seizure type 2- none     The longest seizure free period was 3 years   The seizures were isolated. There was    no cluster of seizures,    no history of status epilepticus    Special features:  They were noted only during the wake, there was no specific timing.     Potential triggering factors were reviewed   Sleep deprivation   Alcohol   Stress   Periods   Other    Epilepsy risk factors:     -Positive: None  -Negative: Normal prenatal and  course. Normal development physically and intellectually. No febrile convulsions. No history of severe head trauma. No meningitis. No stroke. No alcohol abuse. No significant exposure to recreational drugs. There is no family history of epilepsy, or spells.       Current AEDs:  LTG  mg qam     Previous workup:    1. EEG data: 2015 (Dr. Ordoñez) normal awake and sleep EEG    2. Neuroimaging data: MRI brain 2020 Right frontal venous angioma/developmental venous anomaly again seen. No evidence of interval hemorrhage or hemosiderin deposition. No change from prior exam.    3. Recent AED level:    Prior antiepileptic medications were reviewed  Lamotrigine (Lamictal)     Antiepileptic medications most useful:    Other therapeutic interventions:   Vagus nerve stimulation   Diet   Surgery for epilepsy   Other    I reviewed the previous medical history, surgical, family and social histories in the electronic medical record.   On review of systems, 10 of 14 systems are reviewed and found to be negative except as listed above.     Current Outpatient Medications   Medication Sig Dispense Refill   • LamoTRIgine 250 MG TABLET SR 24 HR TAKE 1 TABLET BY MOUTH EVERY MORNING 90 tablet 0   • SYMBICORT 160-4.5 MCG/ACT Aerosol Inhale 2 Puffs 2 Times a Day. 1 Each 11   • SYNTHROID 88 MCG Tab Take 88 mcg by  "mouth Every morning on an empty stomach.     • fluticasone (FLONASE) 50 MCG/ACT nasal spray Administer 1 Spray into affected nostril(S) every day.     • PROAIR  (90 Base) MCG/ACT Aero Soln inhalation aerosol INHALE 2 PUFFS BY MOUTH EVERY FOUR HOURS AS NEEDED FOR SHORTNESS OF BREATH (WHEEZING). 1 Inhaler 1   • Probiotic Product (PROBIOTIC PO) Take  by mouth every day.     • Flaxseed, Linseed, (FLAXSEED OIL PO) Take  by mouth every bedtime. Unsure of dose      • Coenzyme Q10 (CO Q 10 PO) Take  by mouth every bedtime. Unsure of dose      • Multiple Vitamin (MULTIVITAMIN PO) Take  by mouth every bedtime.     • levothyroxine (SYNTHROID) 75 MCG TABS Take 75 mcg by mouth every day. (Patient not taking: Reported on 2021)       No current facility-administered medications for this visit.        No Known Allergies    Past Medical History:   Diagnosis Date   • Allergic rhinitis    • Anesthesia     \"severe nausea\"   • Angioma, venous    • Asthma in adult     mild   • Back pain    • Breath shortness    • Chickenpox    • Cough    • Daytime sleepiness    • Epilepsy (HCC)    • Heart burn    • Hypothyroidism    • Kidney stone    • Nasal drainage    • Pain 12    neck to left wrist, 4/10   • Pain 2014    right shoulder   • Painful joint    • Rhinitis    • Seizure (HCC)     \"simple, partial\", last seizure 2013   • Shortness of breath    • Sleep apnea    • Sore muscles    • Sore throat, chronic    • Unspecified disorder of thyroid     hypothyroid   • Wears glasses    • Wheezing        Social History     Socioeconomic History   • Marital status:      Spouse name: Not on file   • Number of children: Not on file   • Years of education: Not on file   • Highest education level: Not on file   Occupational History   • Not on file   Tobacco Use   • Smoking status: Former Smoker     Packs/day: 0.25     Years: 5.00     Pack years: 1.25     Types: Cigarettes     Quit date:      Years since quittin.5   • " "Smokeless tobacco: Never Used   Vaping Use   • Vaping Use: Never used   Substance and Sexual Activity   • Alcohol use: Yes     Comment: 1 per week   • Drug use: No   • Sexual activity: Not on file   Other Topics Concern   • Not on file   Social History Narrative   • Not on file     Social Determinants of Health     Financial Resource Strain:    • Difficulty of Paying Living Expenses:    Food Insecurity:    • Worried About Running Out of Food in the Last Year:    • Ran Out of Food in the Last Year:    Transportation Needs:    • Lack of Transportation (Medical):    • Lack of Transportation (Non-Medical):    Physical Activity:    • Days of Exercise per Week:    • Minutes of Exercise per Session:    Stress:    • Feeling of Stress :    Social Connections:    • Frequency of Communication with Friends and Family:    • Frequency of Social Gatherings with Friends and Family:    • Attends [employer] Services:    • Active Member of Clubs or Organizations:    • Attends Club or Organization Meetings:    • Marital Status:    Intimate Partner Violence:    • Fear of Current or Ex-Partner:    • Emotionally Abused:    • Physically Abused:    • Sexually Abused:        Family History   Problem Relation Age of Onset   • Asthma Mother    • Other Mother         A Fib   • Other Father         Parkinsons   • Hypertension Other          PHYSICAL EXAM:      /88 (BP Location: Left arm, Patient Position: Sitting, BP Cuff Size: Adult)   Pulse 85   Temp 36.1 °C (97 °F) (Temporal)   Resp 20   Ht 1.702 m (5' 7\")   Wt 93.2 kg (205 lb 7.5 oz)   SpO2 96%   BMI 32.18 kg/m²     On examination,  She appears her stated age. She has a normal, reactive affect.No apparent distress,  alert and appropriate. No labored breathing.   There is no peripheral edema. Skin: no rash or abnormalities     She gives a precise account of  her clinical symptoms. She has fluent conversational speech, without error. No dysarthria, facial movements intact. No UE " drift. I see no tremor.    Gait is normal      The total visit duration was of 45 minutes of which more than 50% was spent in coordination of care and counseling.         Freeman Mcleod MD  Diplomate, American Board of Psychiatry and Neurology   Diplomate, American Board of Psychiatry and Neurology in Epilepsy

## 2021-07-21 ENCOUNTER — OFFICE VISIT (OUTPATIENT)
Dept: NEUROLOGY | Facility: MEDICAL CENTER | Age: 51
End: 2021-07-21
Attending: PSYCHIATRY & NEUROLOGY
Payer: COMMERCIAL

## 2021-07-21 VITALS
HEIGHT: 67 IN | RESPIRATION RATE: 20 BRPM | WEIGHT: 205.47 LBS | BODY MASS INDEX: 32.25 KG/M2 | TEMPERATURE: 97 F | DIASTOLIC BLOOD PRESSURE: 88 MMHG | HEART RATE: 85 BPM | SYSTOLIC BLOOD PRESSURE: 126 MMHG | OXYGEN SATURATION: 96 %

## 2021-07-21 DIAGNOSIS — R56.9 SEIZURE (HCC): Primary | ICD-10-CM

## 2021-07-21 PROCEDURE — 99212 OFFICE O/P EST SF 10 MIN: CPT | Performed by: PSYCHIATRY & NEUROLOGY

## 2021-07-21 PROCEDURE — 99202 OFFICE O/P NEW SF 15 MIN: CPT | Performed by: PSYCHIATRY & NEUROLOGY

## 2021-07-21 PROCEDURE — 99215 OFFICE O/P EST HI 40 MIN: CPT | Performed by: PSYCHIATRY & NEUROLOGY

## 2021-07-21 RX ORDER — LAMOTRIGINE 250 MG/1
TABLET, EXTENDED RELEASE ORAL
Qty: 90 TABLET | Refills: 0 | Status: SHIPPED | OUTPATIENT
Start: 2021-07-21 | End: 2021-11-04 | Stop reason: SDUPTHER

## 2021-07-21 RX ORDER — LIOTHYRONINE SODIUM 5 UG/1
5 TABLET ORAL
COMMUNITY
Start: 2021-05-04 | End: 2022-04-28

## 2021-07-21 ASSESSMENT — FIBROSIS 4 INDEX: FIB4 SCORE: 0.66

## 2021-07-21 ASSESSMENT — PATIENT HEALTH QUESTIONNAIRE - PHQ9: CLINICAL INTERPRETATION OF PHQ2 SCORE: 0

## 2021-07-30 ENCOUNTER — HOSPITAL ENCOUNTER (OUTPATIENT)
Dept: RADIOLOGY | Facility: MEDICAL CENTER | Age: 51
End: 2021-07-30
Attending: FAMILY MEDICINE
Payer: COMMERCIAL

## 2021-07-30 DIAGNOSIS — M81.0 SENILE OSTEOPOROSIS: ICD-10-CM

## 2021-07-30 PROCEDURE — 77080 DXA BONE DENSITY AXIAL: CPT

## 2021-10-11 ENCOUNTER — OFFICE VISIT (OUTPATIENT)
Dept: SLEEP MEDICINE | Facility: MEDICAL CENTER | Age: 51
End: 2021-10-11
Payer: COMMERCIAL

## 2021-10-11 VITALS
RESPIRATION RATE: 16 BRPM | DIASTOLIC BLOOD PRESSURE: 84 MMHG | HEIGHT: 67 IN | HEART RATE: 79 BPM | WEIGHT: 210 LBS | SYSTOLIC BLOOD PRESSURE: 124 MMHG | BODY MASS INDEX: 32.96 KG/M2 | OXYGEN SATURATION: 97 %

## 2021-10-11 DIAGNOSIS — Z23 NEED FOR VACCINATION: Primary | ICD-10-CM

## 2021-10-11 DIAGNOSIS — G47.33 OSA (OBSTRUCTIVE SLEEP APNEA): ICD-10-CM

## 2021-10-11 PROCEDURE — 99203 OFFICE O/P NEW LOW 30 MIN: CPT | Mod: 25 | Performed by: FAMILY MEDICINE

## 2021-10-11 PROCEDURE — 90471 IMMUNIZATION ADMIN: CPT | Performed by: FAMILY MEDICINE

## 2021-10-11 PROCEDURE — 90686 IIV4 VACC NO PRSV 0.5 ML IM: CPT | Performed by: FAMILY MEDICINE

## 2021-10-11 ASSESSMENT — FIBROSIS 4 INDEX: FIB4 SCORE: 0.68

## 2021-10-11 NOTE — PROGRESS NOTES
"  [facility]  Consult Note     Date: 10/11/2021 / Time: 8:53 AM    Patient ID:   Name:             Mir Cisneros   YOB: 1970  Age:                 51 y.o.  female   MRN:               [id number]      Thank you for requesting a sleep medicine consultation on Mir Cisneros at the sleep center.she presents today with the chief complaints of GAIL and to establish as a new pt. She was previously seen by Justine Schultz. Pt was diagnosed with GAIL on 11/23/2016 and has been on CPAP 7 cm since then.The initial presenting symptoms was excessive daytime fatigue and sleepiness.She is referred by Dr. Aguirre for evaluation and treatment of sleep disorder breathing.     HISTORY OF PRESENT ILLNESS:       At night,  Mir Cisneros goes to bed around 11 pm on weekdays and 12 am on the weekends. She gets out of bed at 7 am on weekdays and at 8-9 am on the weekends.  She  Averages about 7-8 hrs of sleep on a good night and 6 hrs on a bad night. She falls asleep within 30 minutes. She wakes up about 3-6 times during the night due to bathroom use or no obvious reason.On average It takes her 10 min to fall back asleep.     She is aware of snoring but denies breathing pauses/gasping or choking in sleep.  She  denies any symptoms of restless legs syndrome such as an \"urge to move\"  She  legs in the evening or bedtime. She  denies any symptoms of narcolepsy such as sleep paralysis or cataplexy, or any symptoms to suggest parasomnias such as sleep walking or acting out of dreams. However, she occasionally have a nightmare of dying. It happens when she is under stress. It happens couple times a month.     Overall, she does finds her sleep refreshing if she gets enough hours of sleep and while she was using the CPAP. In terms of  excessive daytime sleepiness,  She complains of sleepiness while  at work however denies while reading, watching TV or while driving. [place] sleepiness scale score is " 8/24.She does take regular naps. The naps are usually 30 min long.She drinks about 3-4 caffeinated beverages per day. The 30 day compliance was downloaded which shows inadequate compliance with more that 4 hr usage about 13%. The AHI is has improved to 0/hr. The mask leak is normal.  She is currently not using due worsening of asthma which could likely be due to the issues with of the Respironics recall.      She was diagnosed with sleep apnea on 11/23/2016.  It showed mild to moderate obstructive sleep apnea hypopnea was found. The apnea hypopnea index was 15.5/hr. The supine position aggravated the sleep disordered breathing as reflected by the supine sleep index of 34.4. REM sleep also worsened the sleep disordered breathing as reflected by the REM index of 62.3. The lowest saturation during sleep was 82% but saturations were less than 90% only 2.6% of the recording.She had a CPAP titration on 12/11/2016. The patient was titrated from 5 to 7 cm of water pressure. She did best on CPAP of 7 where she achieved both supine and REM sleep. The resultant AHI was 0.6, the minimum saturation was 93%, and the mean saturation 94.9%.    Other comorbid conditions include moderate persistent asthma, chronic sinusitis and seizure. PFT: FEV1 2.53 86%, proportional reduction in forced volumes, no obstruction or BD response, low ERV. CT sinus 11/2020: mucosal thickening and mucous retention cyst/polyps within the maxillary sinuses.  REVIEW OF SYSTEMS:       Constitutional: Denies fevers, Denies weight changes  Eyes: Denies changes in vision, no eye pain  Ears/Nose/Throat/Mouth: Denies nasal congestion or sore throat   Cardiovascular: Denies chest pain or palpitations   Respiratory: Denies shortness of breath , Denies cough  Gastrointestinal/Hepatic: Denies abdominal pain, nausea, vomiting, diarrhea, constipation or GI bleeding   Genitourinary: Denies bladder dysfunction, dysuria or frequency  Musculoskeletal/Rheum: Denies  joint  pain and swelling   Skin/Breast: Denies rash  Neurological: Denies headache, confusion, memory loss or focal weakness/parasthesias  Psychiatric: denies mood disorder     Comprehensive review of systems form is reviewed with the patient and is attached in the EMR.     PMH:  has a past medical history of Allergic rhinitis, Anesthesia, Angioma, venous, Asthma in adult, Back pain, Breath shortness, Chickenpox, Cough, Daytime sleepiness, Epilepsy (HCC), Heart burn, Hypothyroidism, Kidney stone, Nasal drainage, Pain (05-09-12), Pain (5/27/2014), Painful joint, Rhinitis, Seizure (HCC), Shortness of breath, Sleep apnea, Sore muscles, Sore throat, chronic, Unspecified disorder of thyroid, Wears glasses, and Wheezing.  MEDS:   Current Outpatient Medications:   •  LamoTRIgine 250 MG TABLET SR 24 HR, TAKE 1 TABLET BY MOUTH EVERY MORNING, Disp: 90 tablet, Rfl: 0  •  SYMBICORT 160-4.5 MCG/ACT Aerosol, Inhale 2 Puffs 2 Times a Day., Disp: 1 Each, Rfl: 11  •  SYNTHROID 88 MCG Tab, Take 88 mcg by mouth Every morning on an empty stomach., Disp: , Rfl:   •  fluticasone (FLONASE) 50 MCG/ACT nasal spray, Administer 1 Spray into affected nostril(S) every day., Disp: , Rfl:   •  Probiotic Product (PROBIOTIC PO), Take  by mouth every day., Disp: , Rfl:   •  Flaxseed, Linseed, (FLAXSEED OIL PO), Take  by mouth every bedtime. Unsure of dose , Disp: , Rfl:   •  Coenzyme Q10 (CO Q 10 PO), Take  by mouth every bedtime. Unsure of dose , Disp: , Rfl:   •  Multiple Vitamin (MULTIVITAMIN PO), Take  by mouth every bedtime., Disp: , Rfl:   •  Fluticasone Furoate-Vilanterol (BREO ELLIPTA) 100-25 MCG/INH AEROSOL POWDER, BREATH ACTIVATED, , Disp: , Rfl:   •  liothyronine (CYTOMEL) 5 MCG Tab, Take 5 mcg by mouth every day., Disp: , Rfl:   •  PROAIR  (90 Base) MCG/ACT Aero Soln inhalation aerosol, INHALE 2 PUFFS BY MOUTH EVERY FOUR HOURS AS NEEDED FOR SHORTNESS OF BREATH (WHEEZING). (Patient not taking: Reported on 10/11/2021), Disp: 1 Inhaler,  "Rfl: 1  ALLERGIES: No Known Allergies  SURGHX:   Past Surgical History:   Procedure Laterality Date   • SHOULDER DECOMPRESSION ARTHROSCOPIC  5/29/2014    Performed by Mendel Guzman M.D. at SURGERY [facility]   • CLAVICLE DISTAL EXCISION  5/29/2014    Performed by Mendel Guzman M.D. at SURGERY [facility]   • SHOULDER ARTHROSCOPY W/ ROTATOR CUFF REPAIR  5/29/2014    Performed by Mendel Guzman M.D. at SURGERY [facility]   • NERVE ULNAR TRANSFER  5/11/2012    Performed by ROXY HAMILTON at SURGERY SAME DAY [facility]   • EPICONDYLAR STRIPPING  5/11/2012    Performed by ROXY HAMILTON at SURGERY SAME DAY [facility]   • DILATION AND CURETTAGE  12/2005    [facility]   • PRIMARY C SECTION  2000   • WRIST ARTHROSCOPY  1997    left wrist reconstruction   • SHOULDER ARTHROSCOPY  1993    left acromioplasty     SOCHX:  reports that she quit smoking about 21 years ago. Her smoking use included cigarettes. She has a 1.25 pack-year smoking history. She has never used smokeless tobacco. She reports current alcohol use. She reports that she does not use drugs.   FH:   Family History   Problem Relation Age of Onset   • Asthma Mother    • Other Mother         A Fib   • Other Father         Parkinsons   • Hypertension Other        Physical Exam:  Vitals/ General Appearance:   Weight/BMI: Body mass index is 32.89 kg/m².  /84 (BP Location: Left arm, Patient Position: Sitting, BP Cuff Size: Adult)   Pulse 79   Resp 16   Ht 1.702 m (5' 7\")   Wt 95.3 kg (210 lb)   SpO2 97%   Vitals:    10/11/21 0845   BP: 124/84   BP Location: Left arm   Patient Position: Sitting   BP Cuff Size: Adult   Pulse: 79   Resp: 16   SpO2: 97%   Weight: 95.3 kg (210 lb)   Height: 1.702 m (5' 7\")           Constitutional: Alert, no distress, well-groomed.  Skin: No rashes in visible areas.  Eye: Round. Conjunctiva clear, lids normal. No icterus.   ENMT: Lips pink without lesions, good dentition, moist mucous membranes. Phonation " normal.  Neck: No masses, no thyromegaly. Moves freely without pain.  CV: Pulse as reported by patient  Respiratory: Unlabored respiratory effort, no cough or audible wheeze  Psych: Alert and oriented x3, normal affect and mood.   INVESTIGATIONS:       ASSESSMENT AND PLAN       1.Obstructive Sleep Apnea      The pathophysiology of sleep anea and the increased risk of cardiovascular morbidity from untreated sleep apnea is discussed in detail with the patient.She is urged to avoid supine sleep, weight gain and alcoholic beverages since all of these can worsen sleep apnea. She is cautioned against drowsy driving. If She feels sleepy while driving, She must pull over for a break/nap, rather than persist on the road, in the interest of She own safety and that of others on the road.   Plan   -Risk and benefits of continuing versus withholding therapy was discussed in detail since she has worsening of her asthma and respiratory symptoms in last couple months I recommended to withhold therapy since it could be due to the polyurethane foam down.  She understands the risk of withholding the therapy. After informed discussion new CPAP 7 cm is ordered today   - F/u in 8-10 weeks to assess the efficiacy of recommended pressure    - compliance download was reviewed and discussed with the pt   - compliance was reinforced     2. Regarding treatment of other past medical problems and general health maintenance,  She is urged to follow up with PCP.    3.  Flu vaccine was administered today

## 2021-10-13 ENCOUNTER — APPOINTMENT (RX ONLY)
Dept: URBAN - METROPOLITAN AREA CLINIC 22 | Facility: CLINIC | Age: 51
Setting detail: DERMATOLOGY
End: 2021-10-13

## 2021-10-13 ENCOUNTER — HOSPITAL ENCOUNTER (OUTPATIENT)
Dept: RADIOLOGY | Facility: MEDICAL CENTER | Age: 51
End: 2021-10-13
Attending: FAMILY MEDICINE
Payer: COMMERCIAL

## 2021-10-13 DIAGNOSIS — L73.8 OTHER SPECIFIED FOLLICULAR DISORDERS: ICD-10-CM

## 2021-10-13 DIAGNOSIS — L57.0 ACTINIC KERATOSIS: ICD-10-CM

## 2021-10-13 DIAGNOSIS — L81.4 OTHER MELANIN HYPERPIGMENTATION: ICD-10-CM

## 2021-10-13 DIAGNOSIS — Z71.89 OTHER SPECIFIED COUNSELING: ICD-10-CM

## 2021-10-13 DIAGNOSIS — Z12.31 VISIT FOR SCREENING MAMMOGRAM: ICD-10-CM

## 2021-10-13 PROCEDURE — 77063 BREAST TOMOSYNTHESIS BI: CPT

## 2021-10-13 PROCEDURE — 17000 DESTRUCT PREMALG LESION: CPT

## 2021-10-13 PROCEDURE — ? COUNSELING

## 2021-10-13 PROCEDURE — ? ADDITIONAL NOTES

## 2021-10-13 PROCEDURE — 17003 DESTRUCT PREMALG LES 2-14: CPT

## 2021-10-13 PROCEDURE — 99213 OFFICE O/P EST LOW 20 MIN: CPT | Mod: 25

## 2021-10-13 PROCEDURE — ? SUNSCREEN RECOMMENDATIONS

## 2021-10-13 PROCEDURE — ? LIQUID NITROGEN

## 2021-10-13 ASSESSMENT — LOCATION SIMPLE DESCRIPTION DERM
LOCATION SIMPLE: LEFT ZYGOMA
LOCATION SIMPLE: LEFT NOSE
LOCATION SIMPLE: RIGHT ZYGOMA
LOCATION SIMPLE: LEFT TEMPLE
LOCATION SIMPLE: NOSE
LOCATION SIMPLE: RIGHT CHEEK

## 2021-10-13 ASSESSMENT — LOCATION DETAILED DESCRIPTION DERM
LOCATION DETAILED: LEFT NASAL SIDEWALL
LOCATION DETAILED: LEFT INFERIOR TEMPLE
LOCATION DETAILED: RIGHT CENTRAL ZYGOMA
LOCATION DETAILED: NASAL DORSUM
LOCATION DETAILED: LEFT CENTRAL ZYGOMA
LOCATION DETAILED: RIGHT INFERIOR CENTRAL MALAR CHEEK

## 2021-10-13 ASSESSMENT — LOCATION ZONE DERM
LOCATION ZONE: NOSE
LOCATION ZONE: FACE

## 2021-10-13 NOTE — PROCEDURE: ADDITIONAL NOTES
Detail Level: Zone
Additional Notes: A few lesions treated with LN2.
Render Risk Assessment In Note?: no

## 2021-10-13 NOTE — PROCEDURE: LIQUID NITROGEN
Number Of Freeze-Thaw Cycles: 2 freeze-thaw cycles
Show Applicator Variable?: Yes
Post-Care Instructions: I reviewed with the patient in detail post-care instructions. Patient is to wear sunprotection, and avoid picking at any of the treated lesions. Pt may apply Vaseline to crusted or scabbing areas.
Render Note In Bullet Format When Appropriate: No
Duration Of Freeze Thaw-Cycle (Seconds): 3
Consent: The patient's consent was obtained including but not limited to risks of crusting, scabbing, blistering, scarring, darker or lighter pigmentary change, recurrence, incomplete removal and infection.
Detail Level: Detailed

## 2022-04-28 ENCOUNTER — OFFICE VISIT (OUTPATIENT)
Dept: URGENT CARE | Facility: PHYSICIAN GROUP | Age: 52
End: 2022-04-28
Payer: COMMERCIAL

## 2022-04-28 VITALS
HEIGHT: 67 IN | RESPIRATION RATE: 14 BRPM | DIASTOLIC BLOOD PRESSURE: 80 MMHG | SYSTOLIC BLOOD PRESSURE: 128 MMHG | WEIGHT: 200 LBS | BODY MASS INDEX: 31.39 KG/M2 | TEMPERATURE: 97.9 F | HEART RATE: 82 BPM | OXYGEN SATURATION: 95 %

## 2022-04-28 DIAGNOSIS — J01.00 ACUTE NON-RECURRENT MAXILLARY SINUSITIS: ICD-10-CM

## 2022-04-28 PROCEDURE — 99213 OFFICE O/P EST LOW 20 MIN: CPT | Performed by: INTERNAL MEDICINE

## 2022-04-28 RX ORDER — AMOXICILLIN AND CLAVULANATE POTASSIUM 875; 125 MG/1; MG/1
1 TABLET, FILM COATED ORAL 2 TIMES DAILY
Qty: 20 TABLET | Refills: 0 | Status: SHIPPED | OUTPATIENT
Start: 2022-04-28 | End: 2022-05-08

## 2022-04-28 RX ORDER — TRIAZOLAM 0.12 MG/1
TABLET ORAL
COMMUNITY
Start: 2022-02-02 | End: 2022-10-13

## 2022-04-28 ASSESSMENT — ENCOUNTER SYMPTOMS
FEVER: 1
COUGH: 1
SINUS PAIN: 1
SINUS PRESSURE: 1

## 2022-04-28 ASSESSMENT — FIBROSIS 4 INDEX: FIB4 SCORE: 0.68

## 2022-04-28 NOTE — PROGRESS NOTES
Chief Complaint:      HPI:      Mir Cisneros is a 51 y.o. female with   Sinusitis  This is a new problem. The current episode started 1 to 4 weeks ago. The problem has been gradually worsening since onset. The maximum temperature recorded prior to her arrival was 100.4 - 100.9 F. The fever has been present for 1 to 2 days. Her pain is at a severity of 8/10. The pain is moderate. Associated symptoms include congestion, coughing and sinus pressure. Treatments tried: albuterol, nasal rinse, flonase. The treatment provided no relief.   She is vaccinated for Covid 19 and received a booster.  She already completed 3 home Covid tests.  She declined additional testing         ROS:   Review of Systems   Constitutional: Positive for fever.   HENT: Positive for congestion, sinus pressure and sinus pain.    Respiratory: Positive for cough.         Past Medical History:  She   Patient Active Problem List    Diagnosis Date Noted   • Chronic maxillary sinusitis 12/02/2020   • Cavernous angioma 07/15/2020   • Insomnia 12/16/2016   • BMI 31.0-31.9,adult 12/16/2016   • GAIL (obstructive sleep apnea) 10/31/2016   • Right shoulder pain 05/29/2014   • Cervicalgia 01/14/2014   • Moderate persistent asthma not dependent on systemic steroids    • Seizure (HCC)    • Heart burn    • Disorder of thyroid      Past Surgical History:  She   Past Surgical History:   Procedure Laterality Date   • SHOULDER DECOMPRESSION ARTHROSCOPIC  5/29/2014    Performed by Mendel Guzman M.D. at SURGERY [facility]   • CLAVICLE DISTAL EXCISION  5/29/2014    Performed by Mendel Guzman M.D. at SURGERY [facility]   • SHOULDER ARTHROSCOPY W/ ROTATOR CUFF REPAIR  5/29/2014    Performed by Mendel Guzman M.D. at SURGERY [facility]   • NERVE ULNAR TRANSFER  5/11/2012    Performed by ROXY HAMILTON at SURGERY SAME DAY [facility]   • EPICONDYLAR STRIPPING  5/11/2012    Performed by ROXY HAMILTON at SURGERY SAME DAY [facility]   •  DILATION AND CURETTAGE  2005    D & C   • PRIMARY C SECTION     • WRIST ARTHROSCOPY      left wrist reconstruction   • SHOULDER ARTHROSCOPY      left acromioplasty      Allergies:  She Patient has no known allergies.   Current Medications:  She   Current Outpatient Medications:   •  triazolam (HALCION) 0.125 MG tablet, , Disp: , Rfl:   •  LamoTRIgine 250 MG TABLET SR 24 HR, TAKE 1 TABLET BY MOUTH EVERY MORNING, Disp: 90 Tablet, Rfl: 3  •  Fluticasone Furoate-Vilanterol (BREO ELLIPTA) 100-25 MCG/INH AEROSOL POWDER, BREATH ACTIVATED, , Disp: , Rfl:   •  SYMBICORT 160-4.5 MCG/ACT Aerosol, Inhale 2 Puffs 2 Times a Day., Disp: 1 Each, Rfl: 11  •  SYNTHROID 88 MCG Tab, Take 88 mcg by mouth Every morning on an empty stomach., Disp: , Rfl:   •  fluticasone (FLONASE) 50 MCG/ACT nasal spray, Administer 1 Spray into affected nostril(S) every day., Disp: , Rfl:   •  PROAIR  (90 Base) MCG/ACT Aero Soln inhalation aerosol, INHALE 2 PUFFS BY MOUTH EVERY FOUR HOURS AS NEEDED FOR SHORTNESS OF BREATH (WHEEZING). (Patient not taking: Reported on 10/11/2021), Disp: 1 Inhaler, Rfl: 1  •  Probiotic Product (PROBIOTIC PO), Take  by mouth every day., Disp: , Rfl:   •  Flaxseed, Linseed, (FLAXSEED OIL PO), Take  by mouth every bedtime. Unsure of dose , Disp: , Rfl:   •  Coenzyme Q10 (CO Q 10 PO), Take  by mouth every bedtime. Unsure of dose , Disp: , Rfl:   •  Multiple Vitamin (MULTIVITAMIN PO), Take  by mouth every bedtime., Disp: , Rfl:   Social History:  She   Social History     Socioeconomic History   • Marital status:      Spouse name: Not on file   • Number of children: Not on file   • Years of education: Not on file   • Highest education level: Not on file   Occupational History   • Not on file   Tobacco Use   • Smoking status: Former Smoker     Packs/day: 0.25     Years: 5.00     Pack years: 1.25     Types: Cigarettes     Quit date:      Years since quittin.3   • Smokeless tobacco: Never Used  "  Vaping Use   • Vaping Use: Never used   Substance and Sexual Activity   • Alcohol use: Yes     Comment: 3-4 TIMES A WEEK    • Drug use: No   • Sexual activity: Not on file   Other Topics Concern   • Not on file   Social History Narrative   • Not on file     Social Determinants of Health     Financial Resource Strain: Not on file   Food Insecurity: Not on file   Transportation Needs: Not on file   Physical Activity: Not on file   Stress: Not on file   Social Connections: Not on file   Intimate Partner Violence: Not on file   Housing Stability: Not on file      Family History:   Her   Family History   Problem Relation Age of Onset   • Asthma Mother    • Other Mother         A Fib   • Other Father         Parkinsons   • Hypertension Other         PHYSICAL EXAM:    Vital signs: /80 (BP Location: Right arm, Patient Position: Sitting, BP Cuff Size: Adult)   Pulse 82   Temp 36.6 °C (97.9 °F) (Temporal)   Resp 14   Ht 1.702 m (5' 7\")   Wt 90.7 kg (200 lb)   SpO2 95%   BMI 31.32 kg/m²   Physical Exam  Constitutional:       Appearance: She is obese.   HENT:      Head: Normocephalic and atraumatic.      Comments: Positive for bilateral maxillary sinus tenderness     Nose: Congestion present. No rhinorrhea.      Mouth/Throat:      Mouth: Mucous membranes are moist.      Pharynx: Oropharynx is clear.   Eyes:      Extraocular Movements: Extraocular movements intact.      Conjunctiva/sclera: Conjunctivae normal.      Pupils: Pupils are equal, round, and reactive to light.   Cardiovascular:      Rate and Rhythm: Normal rate and regular rhythm.      Pulses: Normal pulses.      Heart sounds: Normal heart sounds.   Pulmonary:      Effort: Pulmonary effort is normal.      Breath sounds: Normal breath sounds.   Neurological:      Mental Status: She is alert.         Labs:  No results found for: HBA1C   Lab Results   Component Value Date/Time    CHOLSTRLTOT 176 06/18/2020 0723    TRIGLYCERIDE 142 06/18/2020 0723    HDL 58 " 06/18/2020 0723    LDL 90 06/18/2020 0723     No results found for: MICROALBCALC, MALBCRT, MALBEXCR, SQDRRQ94, MICROALBUR, MICRALB, UMICROALBUM, MICROALBTIM   Lab Results   Component Value Date/Time    CREATININE 0.92 06/18/2020 07:23 AM    CREATININE 0.8 01/25/2007 04:15 PM           ASSESSMENT/PLAN:   1. Acute non-recurrent maxillary sinusitis  Start Augmentin  Recommended she continue nasal decongestants  Continue Tylenol for fever and pain  May also use NSAIDs for pain  May return if symptoms do not improve      Return if symptoms worsen or fail to improve.       This patient during there office visit was started on new medication.  Side effects of new medications were discussed with the patient today in the office. The patient was supplied paperwork on this new medication.    Red flags discussed and when to RTC or seek care in the ER  Supportive care, differential diagnoses, and indications for immediate follow-up discussed with patient.    Pathogenesis of diagnosis discussed including typical length and natural progression.       Instructed to return to  or nearest emergency department if symptoms fail to improve, for any change in condition, further concerns, or new concerning symptoms.  Patient states understanding of the plan of care and discharge instructions.      Maximino Alejandre MD, FACE, [facility]  04/28/22

## 2022-05-13 NOTE — PROGRESS NOTES
"Pulmonary Clinic Note    Date of Visit: 5/16/2022     Chief Complaint:  No chief complaint on file.    HPI:   Mir Cisneros is a very pleasant 51 y.o. year old female former smoker (1.25 pack-years, quit in 1995), with a PMHx of asthma, chronic sinusitis, focal epilepsy, and GAIL (PSG 2016 AHI 15.5), on CPAP last seen in sleep clinic 2017 who presented to the Pulmonary Clinic for a regular follow up. Last seen in the office on 2/23/2021 with Dr. Aguirre.     Pulmonary history per Dr. Aguirre's HPI:  \"Mir has had mild intermittent asthma since college, very mild and well-controlled rare use of rescue inhaler.  Her symptoms in the past have been exacerbated by stress and seasonal allergies. In the last 12 months, symptoms have been worsening with cough and shortness of breath. No new changes in home/living/work life, although she is working more from home now due to the coronavirus pandemic. Had an ER visit in 4/2020 and 6/2020, CXR at that time clear.  CT of the sinuses showed mucosal thickening and mucous retention cyst/polyps within the maxillary sinuses.     Current regimen: Symbicort 160.  Did not tolerate Breo.  Overall feels some improvement in symptoms with Symbicort.  Using rescue inhaler 1-2x/month.  Also prescribed Singulair but has not yet started. Using Allegra and Flonase, saline rinses have helped dramatically. Referred to allergy-recommended antihistamine prior to spring season\"    PFTs on 3/1/2021 shows normal pulmonary functioning with a FVC of 3.08 L or 83%, FEV1 2.53 L or 86%, FEV1/FVC 82%, %, TLC 94%, and DLCO 120% predicted.    Today***    Exacerbations this year:    MMRC Grade:   0- Breathless only during strenuous exercise  1- Short of breath when hurrying or going up a small hill  2- Walks slower than friends due to breathlessness, has to stop at own pace  3- Stops to catch breath on level ground after 100m  4- Breathless with ambulating around house or " "ADLs    Sx  symbicort  Albuterol  allegra  ENT?  PFTs???--- porder  Nasal rinses  VACC- pneumo          Past Medical History:   Diagnosis Date   • Allergic rhinitis    • Anesthesia     \"severe nausea\"   • Angioma, venous    • Asthma in adult     mild   • Back pain    • Breath shortness    • Chickenpox    • Cough    • Daytime sleepiness    • Epilepsy ([facility])    • Heart burn    • Hypothyroidism    • Kidney stone    • Nasal drainage    • Pain 05-09-12    neck to left wrist, 4/10   • Pain 5/27/2014    right shoulder   • Painful joint    • Rhinitis    • Seizure ([facility])     \"simple, partial\", last seizure 01/2013   • Shortness of breath    • Sleep apnea    • Sore muscles    • Sore throat, chronic    • Unspecified disorder of thyroid     hypothyroid   • Wears glasses    • Wheezing      Past Surgical History:   Procedure Laterality Date   • SHOULDER DECOMPRESSION ARTHROSCOPIC  5/29/2014    Performed by Mendel Guzman M.D. at SURGERY [facility]   • CLAVICLE DISTAL EXCISION  5/29/2014    Performed by Mendel Guzman M.D. at SURGERY [facility]   • SHOULDER ARTHROSCOPY W/ ROTATOR CUFF REPAIR  5/29/2014    Performed by Mendel Guzman M.D. at SURGERY [facility]   • NERVE ULNAR TRANSFER  5/11/2012    Performed by ROXY HAMILTON at SURGERY [facility]   • EPICONDYLAR STRIPPING  5/11/2012    Performed by ROXY HAMILTON at SURGERY [facility] DAY [facility]   • DILATION AND CURETTAGE  12/2005    D & C   • PRIMARY C SECTION  2000   • WRIST ARTHROSCOPY  1997    left wrist reconstruction   • SHOULDER ARTHROSCOPY  1993    left acromioplasty     Social History     Socioeconomic History   • Marital status:      Spouse name: Not on file   • Number of children: Not on file   • Years of education: Not on file   • Highest education level: Not on file   Occupational History   • Not on file   Tobacco Use   • Smoking status: Former Smoker     Packs/day: 0.25     Years: 5.00     Pack years: 1.25     Types: Cigarettes "     Quit date:      Years since quittin.3   • Smokeless tobacco: Never Used   Vaping Use   • Vaping Use: Never used   Substance and Sexual Activity   • Alcohol use: Yes     Comment: 3-4 TIMES A WEEK    • Drug use: No   • Sexual activity: Not on file   Other Topics Concern   • Not on file   Social History Narrative   • Not on file     Social Determinants of Health     Financial Resource Strain: Not on file   Food Insecurity: Not on file   Transportation Needs: Not on file   Physical Activity: Not on file   Stress: Not on file   Social Connections: Not on file   Intimate Partner Violence: Not on file   Housing Stability: Not on file        Family History   Problem Relation Age of Onset   • Asthma Mother    • Other Mother         A Fib   • Other Father         Parkinsons   • Hypertension Other      Current Outpatient Medications on File Prior to Visit   Medication Sig Dispense Refill   • SYMBICORT 160-4.5 MCG/ACT Aerosol Inhale 2 Puffs 2 times a day. 2 Each 0   • triazolam (HALCION) 0.125 MG tablet      • LamoTRIgine 250 MG TABLET SR 24 HR TAKE 1 TABLET BY MOUTH EVERY MORNING 90 Tablet 3   • SYNTHROID 88 MCG Tab Take 88 mcg by mouth Every morning on an empty stomach.     • fluticasone (FLONASE) 50 MCG/ACT nasal spray Administer 1 Spray into affected nostril(S) every day.     • Probiotic Product (PROBIOTIC PO) Take  by mouth every day.     • Flaxseed, Linseed, (FLAXSEED OIL PO) Take  by mouth every bedtime. Unsure of dose      • Coenzyme Q10 (CO Q 10 PO) Take  by mouth every bedtime. Unsure of dose      • Multiple Vitamin (MULTIVITAMIN PO) Take  by mouth every bedtime.       No current facility-administered medications on file prior to visit.     Allergies: Patient has no known allergies.    ROS:   ROS    Vitals:  There were no vitals taken for this visit.    Physical Exam:  Physical Exam    Laboratory Data:  PFTs (Date: 3/1/2021)-    Impression:  1.  Baseline spirometry shows normal airflows.  2.  No  significant bronchodilator response.  3.  Lung volumes are within normal limits other than ERV which may be due to patient body habitus.  4.  Diffusion capacity is at the upper limits of normal at 120% predicted.  Normal pulmonary function testing with normal flow volume loop.  This does not rule out reactive airways disease.  Suggest clinical correlation.    CXR: (Date: 6/8/2020)-    Impression:  No active disease.      Assessment and Plan:    Problem List Items Addressed This Visit    None         Diagnostic studies have been reviewed with the patient.    No follow-ups on file.     This note was generated using voice recognition software which has a chance of producing errors of grammar and possibly content.  I have made every reasonable attempt to find and correct any obvious errors, but it should be expected that some may not be found prior to finalization of this note.    Time spent in record review prior to patient arrival, reviewing results, and in face-to-face encounter totaled *** min.  __________  SIVA Haynes  Pulmonary Medicine  [facility]

## 2022-05-16 ENCOUNTER — TELEMEDICINE (OUTPATIENT)
Dept: SLEEP MEDICINE | Facility: MEDICAL CENTER | Age: 52
End: 2022-05-16
Payer: COMMERCIAL

## 2022-05-16 VITALS — BODY MASS INDEX: 31.39 KG/M2 | HEIGHT: 67 IN | WEIGHT: 200 LBS

## 2022-05-16 DIAGNOSIS — J32.0 CHRONIC MAXILLARY SINUSITIS: ICD-10-CM

## 2022-05-16 DIAGNOSIS — J45.40 MODERATE PERSISTENT ASTHMA NOT DEPENDENT ON SYSTEMIC STEROIDS: ICD-10-CM

## 2022-05-16 PROCEDURE — 99214 OFFICE O/P EST MOD 30 MIN: CPT | Mod: 95

## 2022-05-16 RX ORDER — FLUTICASONE PROPIONATE AND SALMETEROL 250; 50 UG/1; UG/1
1 POWDER RESPIRATORY (INHALATION) 2 TIMES DAILY
Qty: 3 EACH | Refills: 3 | Status: SHIPPED | OUTPATIENT
Start: 2022-05-16 | End: 2023-08-11

## 2022-05-16 ASSESSMENT — FIBROSIS 4 INDEX: FIB4 SCORE: 0.68

## 2022-05-16 ASSESSMENT — PATIENT HEALTH QUESTIONNAIRE - PHQ9: CLINICAL INTERPRETATION OF PHQ2 SCORE: 0

## 2022-05-16 NOTE — ASSESSMENT & PLAN NOTE
PFTs on 3/1/2021 shows a normal pulmonary function test with a FVC of 3.08 L or 83%, FEV1 2.53 L or 86%, FEV1/FVC 82%, %, TLC 94%, and DLCO 120% predicted.  She is currently on Symbicort 160 during fall and spring with rare use of albuterol.  She states there is a seasonal allergy component to her asthma, as she does not need her inhaler during winter or summer.  She is currently taking Zyrtec which seems to have a good effect.  -- Switched patient to [employer] since her insurance needs a prior authorization for Symbicort.  If there is a significant cost to the Advair we will obtain a prior authorization for Symbicort and give her a sample to cover her in the meantime.  -- Continue albuterol as needed  -- Continue Zyrtec  -- Continue sinus rinses twice daily followed by Flonase

## 2022-05-16 NOTE — PROGRESS NOTES
"Telemedicine Video Visit: Established Patient   This visit was conducted via Zoom using secure and encrypted videoconferencing technology. The patient was in a private location in the [facility]. The patient's identity was confirmed and verbal consent was obtained for this virtual visit. Given the importance of social distancing and other strategies recommended to reduce the risk of COVID-19 transmission, I am providing medical care to this patient via audio/video visit in place of an in person visit at the request of the patient. Verbal consent to telehealth, risks, benefits, and consequences were discussed. Patient retains the right to withdraw at any time. All existing confidentiality protections apply. The patient has access to all transmitted medical information. No dissemination of any patient images or information to other entities without further written consent.    Place of Service: Work at [place], [place]    Subjective:     Date of Visit: 5/16/2022     Chief Complaint   Patient presents with   • Asthma     Last seen 03/23/21     HPI:   Mir Cisneros is a very pleasant 51 y.o. year old female former smoker (1.25 pack-years, quit in 1995), with a PMHx of asthma, chronic sinusitis, focal epilepsy, and GAIL (PSG 2016 AHI 15.5), on CPAP last seen in sleep clinic 2017 who presented to the Pulmonary Clinic for a regular follow up. Last seen in the office on 2/23/2021 with Dr. Aguirre.     Pulmonary history per Dr. Aguirre's HPI:  \"Mir has had mild intermittent asthma since college, very mild and well-controlled rare use of rescue inhaler.  Her symptoms in the past have been exacerbated by stress and seasonal allergies. In the last 12 months, symptoms have been worsening with cough and shortness of breath. No new changes in home/living/work life, although she is working more from home now due to the coronavirus pandemic. Had an ER visit in 4/2020 and 6/2020, CXR at that time clear.  CT of " "the sinuses showed mucosal thickening and mucous retention cyst/polyps within the maxillary sinuses.     Current regimen: Symbicort 160.  Did not tolerate Breo.  Overall feels some improvement in symptoms with Symbicort.  Using rescue inhaler 1-2x/month.  Also prescribed Singulair but has not yet started. Using Allegra and Flonase, saline rinses have helped dramatically. Referred to allergy-recommended antihistamine prior to spring season\"    PFTs on 3/1/2021 shows normal pulmonary functioning with a FVC of 3.08 L or 83%, FEV1 2.53 L or 86%, FEV1/FVC 82%, %, TLC 94%, and DLCO 120% predicted.    Today states that she only uses the Symbicort seasonally for fall and spring seasons.  She does find good effect with the Symbicort and rarely uses albuterol, only a couple of times in the past several weeks.  She is currently on Zyrtec, which seems to help with her allergy symptoms.  Her sinusitis is much improved since her last visit with the nasal rinses, Flonase, and Zyrtec.  She did see an allergist which suggested allergy shots but the patient decided to hold off at the time.  Allergy panel did show a positive reactions to John and mold.  Symptomatically, she reports having a dry cough.  Denies any wheezing or shortness of breath.  She is fully vaccinated.  She tried to refill her Symbicort, but her insurance required a prior authorization.     Exacerbations this year:  0    MMRC Grade: 0- Breathless only during strenuous exercise      ROS   Constitutional: Denies fever, chills, sweats,  weight loss, fatigue  Cardiovascular: Denies chest pain, tightness, palpitations, swelling in legs/feet  Respiratory: Denies shortness of breath, cough, sputum, wheezing, painful breathing   Gastrointestinal: Denies  difficulty swallowing, nausea, abdominal pain, diarrhea, constipation, heartburn.  Musculoskeletal: Denies painful joints, sore muscles,     No Known Allergies    Current medicines (including changes today)  Current " Outpatient Medications   Medication Sig Dispense Refill   • fluticasone-salmeterol (ADVAIR DISKUS) 250-50 MCG/ACT AEROSOL POWDER, BREATH ACTIVATED Inhale 1 Puff  in the morning and 1 Puff in the evening. Rinse mouth after each use. 3 Each 3   • triazolam (HALCION) 0.125 MG tablet      • LamoTRIgine 250 MG TABLET SR 24 HR TAKE 1 TABLET BY MOUTH EVERY MORNING 90 Tablet 3   • SYNTHROID 88 MCG Tab Take 88 mcg by mouth Every morning on an empty stomach.     • fluticasone (FLONASE) 50 MCG/ACT nasal spray Administer 1 Spray into affected nostril(S) every day.     • Probiotic Product (PROBIOTIC PO) Take  by mouth every day.     • Flaxseed, Linseed, (FLAXSEED OIL PO) Take  by mouth every bedtime. Unsure of dose      • Coenzyme Q10 (CO Q 10 PO) Take  by mouth every bedtime. Unsure of dose      • Multiple Vitamin (MULTIVITAMIN PO) Take  by mouth every bedtime.       No current facility-administered medications for this visit.     Patient Active Problem List    Diagnosis Date Noted   • Moderate persistent asthma not dependent on systemic steroids      Priority: High   • Chronic maxillary sinusitis 12/02/2020     Priority: Medium   • Cavernous angioma 07/15/2020   • Insomnia 12/16/2016   • BMI 31.0-31.9,adult 12/16/2016   • GAIL (obstructive sleep apnea) 10/31/2016   • Right shoulder pain 05/29/2014   • Cervicalgia 01/14/2014   • Seizure (HCC)    • Heart burn    • Disorder of thyroid      Family History   Problem Relation Age of Onset   • Asthma Mother    • Other Mother         A Fib   • Other Father         Parkinsons   • Hypertension Other      She  has a past medical history of Allergic rhinitis, Anesthesia, Angioma, venous, Asthma in adult, Back pain, Breath shortness, Chickenpox, Cough, Daytime sleepiness, Epilepsy ([facility]), Heart burn, Hypothyroidism, Kidney stone, Nasal drainage, Pain (05-09-12), Pain (5/27/2014), Painful joint, Rhinitis, Seizure ([facility]), Shortness of breath, Sleep apnea, Sore muscles, Sore throat, chronic,  "Unspecified disorder of thyroid, Wears glasses, and Wheezing.  She  has a past surgical history that includes primary c section (2000); shoulder arthroscopy (1993); nerve ulnar transfer (5/11/2012); epicondylar stripping (5/11/2012); wrist arthroscopy (1997); dilation and curettage (12/2005); shoulder decompression arthroscopic (5/29/2014); clavicle distal excision (5/29/2014); and shoulder arthroscopy w/ rotator cuff repair (5/29/2014).       Objective:   Vitals obtained by patient:  Ht 1.702 m (5' 7\") Comment: per pt  Wt 90.7 kg (200 lb) Comment: per pt  BMI 31.32 kg/m²     PHYSICAL EXAM: Limited due to telemedicine visit   Constitutional: Alert, no distress, well-groomed.  Skin: No rashes in visible areas.  Eye: Round. Conjunctiva clear, lids normal. No icterus.   ENMT: Lips pink without lesions, good dentition, moist mucous membranes. Phonation normal.  Neck: No masses, no thyromegaly. Moves freely without pain.  CV: Pulse as reported by patient  Respiratory: Unlabored respiratory effort, no cough or audible wheeze  Psych: Alert and oriented x3, normal affect and mood.     Laboratory Data:  PFTs (Date: 3/1/2021)-    Impression:  1.  Baseline spirometry shows normal airflows.  2.  No significant bronchodilator response.  3.  Lung volumes are within normal limits other than ERV which may be due to patient body habitus.  4.  Diffusion capacity is at the upper limits of normal at 120% predicted.  Normal pulmonary function testing with normal flow volume loop.  This does not rule out reactive airways disease.  Suggest clinical correlation.    CXR: (Date: 6/8/2020)-    Impression:  No active disease.    Assessment and Plan:   The following treatment plan was discussed:     Problem List Items Addressed This Visit     Moderate persistent asthma not dependent on systemic steroids     PFTs on 3/1/2021 shows a normal pulmonary function test with a FVC of 3.08 L or 83%, FEV1 2.53 L or 86%, FEV1/FVC 82%, %, TLC 94%, " and DLCO 120% predicted.  She is currently on Symbicort 160 during fall and spring with rare use of albuterol.  She states there is a seasonal allergy component to her asthma, as she does not need her inhaler during winter or summer.  She is currently taking Zyrtec which seems to have a good effect.  -- Switched patient to Advair since her insurance needs a prior authorization for Symbicort.  If there is a significant cost to the Advair we will obtain a prior authorization for Symbicort and give her a sample to cover her in the meantime.  -- Continue albuterol as needed  -- Continue Zyrtec  -- Continue sinus rinses twice daily followed by Flonase           Relevant Medications    fluticasone-salmeterol (ADVAIR DISKUS) 250-50 MCG/ACT AEROSOL POWDER, BREATH ACTIVATED    Chronic maxillary sinusitis     CT sinus in November 2020 showed mucosal thickening and mucous retention cyst/polyps within the maxillary sinuses.  She does follow-up with the ENT and allergy.  -- Continue saline rinses twice daily followed by Flonase  -- Continue Zyrtec                 Diagnostic studies have been reviewed with the patient.    Follow-up: Return in about 1 year (around 5/16/2023), or if symptoms worsen or fail to improve.    Face to Face Video Visit:   I spent 33 minutes with patient/guardian and I conducted this visit with audio and video present.    This note was generated using voice recognition software which has a chance of producing errors of grammar and content.  I have made every reasonable attempt to find and correct any errors, but it should be expected that some may not be found prior to finalization of this note.  __________  SIVA Haynes  Pulmonary & Sleep Medicine  [facility]

## 2022-05-16 NOTE — ASSESSMENT & PLAN NOTE
CT sinus in November 2020 showed mucosal thickening and mucous retention cyst/polyps within the maxillary sinuses.  She does follow-up with the ENT and allergy.  -- Continue saline rinses twice daily followed by Flonase  -- Continue Zyrtec

## 2022-06-13 ENCOUNTER — TELEPHONE (OUTPATIENT)
Dept: SLEEP MEDICINE | Facility: MEDICAL CENTER | Age: 52
End: 2022-06-13
Payer: COMMERCIAL

## 2022-09-16 DIAGNOSIS — R56.9 SEIZURE (HCC): ICD-10-CM

## 2022-09-16 RX ORDER — LAMOTRIGINE 250 MG/1
TABLET, EXTENDED RELEASE ORAL
Qty: 90 TABLET | Refills: 3 | Status: SHIPPED | OUTPATIENT
Start: 2022-09-16 | End: 2023-08-11 | Stop reason: SDUPTHER

## 2022-10-13 ENCOUNTER — TELEMEDICINE (OUTPATIENT)
Dept: TELEHEALTH | Facility: TELEMEDICINE | Age: 52
End: 2022-10-13
Payer: COMMERCIAL

## 2022-10-13 VITALS — HEIGHT: 67 IN | BODY MASS INDEX: 30.61 KG/M2 | WEIGHT: 195 LBS

## 2022-10-13 DIAGNOSIS — J32.9 RHINOSINUSITIS: ICD-10-CM

## 2022-10-13 PROCEDURE — 99213 OFFICE O/P EST LOW 20 MIN: CPT | Mod: 95 | Performed by: FAMILY MEDICINE

## 2022-10-13 RX ORDER — DOXYCYCLINE HYCLATE 100 MG
100 TABLET ORAL EVERY 12 HOURS
Qty: 14 TABLET | Refills: 0 | Status: SHIPPED | OUTPATIENT
Start: 2022-10-13 | End: 2022-10-20

## 2022-10-13 NOTE — PROGRESS NOTES
Colleen Hester Heena Cisneros is a 52 y.o. female who presents with Sinusitis (Symptoms x 1 week)        This virtual internet evaluation was conducted via Zoom using secure and encrypted videoconferencing technology and done as best possible given this is a virtual visit. The patient was in their home in the [facility].  The patient's identity was confirmed and verbal consent was obtained for this virtual visit.    HPI:  1-2 wks w/ sinus infection. Has hx of these and feels same. + sinus pain/pressure and colorful dc (more so on Lt side). No associated NVFC    Pregnant/Breast feeding: NA    Allergies: denies     PMH: As reported by patient in chart, otherwise none reported    PSH: As reported by patient in chart, otherwise none reported    Meds: As reported by patient in chart, otherwise none reported      O: ( Mode of consult: Video visit).  - Constitutional: Alert, oriented, no visible distress and non-toxic appearing.  - Skin: No pallor or jaundice appreciated. No rashes in visible areas.  - Eye: No obvious discharge, no eyelid swelling or color change, no obvious conjunctival injection.  - ENT: Lips pink without lesions or periorbital lesions, Phonation normal (not muffled, no stridor/trismus/drooling or tripoding).  - Neck: No obvious masses/swelling/thyromegaly or tracheal deviation observed. Moves freely without pain.  - CV: skin hue appears normal, not pale or diaphoretic.  - Respiratory: Unlabored respiratory effort and no signs of respiratory distress, no appreciable tachypnea, conversing normally without difficulty, no cough or audible wheeze or stridor.  - Psych: Alert and oriented, affect, judgement and mood appear normal.      A/P:       1. Rhinosinusitis  doxycycline (VIBRAMYCIN) 100 MG Tab            Discharge Instructions:  THERE ARE SIGNIFICANT LIMITATIONS TO A VISIT THROUGH TELEMEDICINE. Certainly if not improving in the next 48-72 hours or developing new/concerning symptoms or  getting/feeling worse (such as nausea, vomiting, fever/chills, chest pain, shortness of breath) go to a walk-in clinic or ER immediately for in-person evaluation.     Patient voiced understanding and agreed to plan. Please see your PCP on an annual basis and kindly call/visit your PCP ASAP to follow up on this visit and make sure you are improving and no further additional treatment or ongoing work-up is warranted.    CARE RENDERED DURING COVID PANDEMIC UNDER CRISIS STANDARDS

## 2022-10-17 ENCOUNTER — HOSPITAL ENCOUNTER (OUTPATIENT)
Dept: RADIOLOGY | Facility: MEDICAL CENTER | Age: 52
End: 2022-10-17
Attending: FAMILY MEDICINE
Payer: COMMERCIAL

## 2022-10-17 DIAGNOSIS — Z12.31 VISIT FOR SCREENING MAMMOGRAM: ICD-10-CM

## 2022-10-17 PROCEDURE — 77063 BREAST TOMOSYNTHESIS BI: CPT

## 2023-08-09 NOTE — PROGRESS NOTES
"[facility]    Patient name: Mir Cisneros  YOB: 1970  MRN: [id number]  Date of visit: 2023     HPI:    Mir Cisneros is a 53 y.o. right handed woman who is being seen in transfer of care for seizures. Last seen by Dr. Mcleod on 21.    Per Dr. Mcleod's last note:  Dominant hand: right handed      In brief, her pertinent medical history is remarkable for stable right frontal venous angioma/developmental venous anomaly with most recent MRI in .      Seizure type 1 - focal seizure aware      The onset of seizure was , she was sitting on the stool and sudden onset room spinning and fell off, had difficulty speaking.   She also developed bad headache at that time.   Then she started to have \"simple partial seizure\"  The ictal behavior was stereotyped and described as closed in for 30 seconds --> post-ictal feels dizzy sensation for 30 minutes, fatigue afterwards. No LOC.  Frequency: she was having fairly frequent on weekly basis.  The last seizure was in         The longest seizure free period was 3 years   The seizures were isolated. There was               no cluster of seizures,               no history of status epilepticus     Special features:  They were noted only during the wake, there was no specific timing.      Potential triggering factors were reviewed              Sleep deprivation              Alcohol              Stress              Periods              Other     Epilepsy risk factors:      -Positive: None  -Negative: Normal prenatal and  course. Normal development physically and intellectually. No febrile convulsions. No history of severe head trauma. No meningitis. No stroke. No alcohol abuse. No significant exposure to recreational drugs. There is no family history of epilepsy, or spells.         Current AEDs:  LTG  mg qam      Previous workup:     1. EEG data: 2015 (Dr. Ordoñez) normal awake and sleep EEG     2. " "Neuroimaging data: MRI brain 2020 Right frontal venous angioma/developmental venous anomaly again seen. No evidence of interval hemorrhage or hemosiderin deposition. No change from prior exam.     3. Prior antiepileptic medications were reviewed  Lamotrigine (Lamictal)            Interval history:  The patient is unaccompanied to the visit and provides a history independently.  I discussed and verified the above history with the patient.    She has been doing well overall with no breakthrough seizures.  Her last seizure was in 2018.    She is hoping to be able to come off of the Lamictal if possible.  The diagnosis of epilepsy was made based on the presence of a postictal period though it was not fully clear whether the episodes she was having were seizures.     No problems with mood currently.     She is currently driving.     ROS:   Constitutional: No fevers or chills.  Eyes: No blurry vision or eye pain.  ENT: No dysphagia or hearing loss.  Respiratory: No cough or shortness of breath.  Cardiovascular: No chest pain or palpitations.  GI: No nausea, vomiting, or diarrhea.  : No urinary incontinence or dysuria.  Musculoskeletal: No joint swelling or arthralgias.  Skin: No skin rashes.  Neuro: No headaches, dizziness, or tremors.  Endocrine: No heat or cold intolerance. No polydipsia or polyuria.  Psych: No depression or anxiety.  Heme/Lymph: No easy bruising or swollen lymph nodes.  All other review of systems were reviewed and were negative.     Past Medical History:   Past Medical History:   Diagnosis Date    Pain 5/27/2014    right shoulder    Pain 05-09-12    neck to left wrist, 4/10    Allergic rhinitis     Anesthesia     \"severe nausea\"    Angioma, venous     Asthma in adult     mild    Back pain     Breath shortness     Chickenpox     Cough     Daytime sleepiness     Epilepsy (HCC)     Heart burn     Hypothyroidism     Kidney stone     Nasal drainage     Painful joint     Rhinitis     Seizure (HCC)     " "\"simple, partial\", last seizure 2013    Shortness of breath     Sleep apnea     Sore muscles     Sore throat, chronic     Unspecified disorder of thyroid     hypothyroid    Wears glasses     Wheezing        Past Surgical History:   Past Surgical History:   Procedure Laterality Date    SHOULDER DECOMPRESSION ARTHROSCOPIC  2014    Performed by Mendel Guzman M.D. at SURGERY [facility] ORS    CLAVICLE DISTAL EXCISION  2014    Performed by Mendel Guzman M.D. at SURGERY [facility] ORS    SHOULDER ARTHROSCOPY W/ ROTATOR CUFF REPAIR  2014    Performed by Mendel Guzman M.D. at SURGERY [facility] OR[facility]    NERVE ULNAR TRANSFER  2012    Performed by ROXY HAMILTON at SURGERY SAME DAY [facility] ORS    EPICONDYLAR STRIPPING  2012    Performed by ROXY HAMILTON at SURGERY SAME DAY [facility] ORS    DILATION AND CURETTAGE  2005    D & C    PRIMARY C SECTION      WRIST ARTHROSCOPY      left wrist reconstruction    SHOULDER ARTHROSCOPY      left acromioplasty       Social History:   Social History     Socioeconomic History    Marital status:      Spouse name: Not on file    Number of children: Not on file    Years of education: Not on file    Highest education level: Not on file   Occupational History    Not on file   Tobacco Use    Smoking status: Former     Packs/day: 0.25     Years: 5.00     Pack years: 1.25     Types: Cigarettes     Quit date:      Years since quittin.6    Smokeless tobacco: Never   Vaping Use    Vaping Use: Never used   Substance and Sexual Activity    Alcohol use: Yes     Comment: 3-4 TIMES A WEEK     Drug use: No    Sexual activity: Not on file   Other Topics Concern    Not on file   Social History Narrative    Not on file     Social Determinants of Health     Financial Resource Strain: Not on file   Food Insecurity: Not on file   Transportation Needs: Not on file   Physical Activity: Not on file   Stress: Not on file   Social Connections: " Not on file   Intimate Partner Violence: Not on file   Housing Stability: Not on file       Family Hx:   Family History   Problem Relation Age of Onset    Asthma Mother     Other Mother         A Fib    Other Father         Parkinsons    Hypertension Other        Current Medications:   Current Outpatient Medications:     LamoTRIgine 250 MG TABLET SR 24 HR, TAKE 1 TABLET BY MOUTH EVERY MORNING, Disp: 90 Tablet, Rfl: 3    SYNTHROID 88 MCG Tab, Take 88 mcg by mouth Every morning on an empty stomach., Disp: , Rfl:     Probiotic Product (PROBIOTIC PO), Take  by mouth every day., Disp: , Rfl:     Flaxseed, Linseed, (FLAXSEED OIL PO), Take  by mouth every bedtime. Unsure of dose , Disp: , Rfl:     Coenzyme Q10 (CO Q 10 PO), Take  by mouth every bedtime. Unsure of dose , Disp: , Rfl:     Multiple Vitamin (MULTIVITAMIN PO), Take  by mouth every bedtime., Disp: , Rfl:     Allergies: No Known Allergies      Physical Exam:   Ambulatory Vitals  Vitals:    08/11/23 1019   BP: 128/78   Pulse: 76   Temp: 36.9 °C (98.5 °F)   SpO2: 97%       Constitutional: Well-developed, well-nourished, good hygiene. Appears stated age.  Cardiovascular: RRR, with no murmurs, rubs or gallops. No carotid bruits. No peripheral edema, pedal pulses intact.   Respiratory: Lungs CTA B/L, no W/R/R.   Skin: Warm, dry, intact. No rashes observed.  Neurologic:   Mental Status: Awake, alert, oriented x 3.   Speech: Fluent with normal prosody.   Memory: Able to recall recent and remote events accurately.    Concentration: Attentive. Able to focus on history and follow multi-step commands.   Fund of Knowledge: Appropriate.   Cranial Nerves:    CN II: PERRL     CN III, IV, VI: EOMI without nystagmus    CN V: Facial sensation intact and symmetric in all 3 trigeminal distributions    CN VII: No facial asymmetry    CN VIII: Hearing intact to voice    CN IX and X: Palate elevates symmetrically, gag reflex not tested    CN XI: Symmetric shoulder shrug     CN XII:  Tongue midline   Motor: 5/5 in upper and lower extremities bilaterally   Sensory: Intact light touch diffusely    Coordination: No evidence of past-pointing on finger to nose testing, no dysdiadochokinesia. Heel to shin intact.    DTR's: 2+ throughout    Gait: ambulates steadily without assistive device. Able to perform tandem gait.    Movements: No resting tremors or abnormal movements observed.   Musculoskeletal:    Strength: as above   Tone: Normal bulk and tone   Joints: No swelling    Studies:      Labs reviewed:      Imaging:     MRI brain wwo contrast, 3T epilepsy protocol 7/22/2020  IMPRESSION:     1.  Right frontal venous angioma/developmental venous anomaly again seen. No evidence of interval hemorrhage or hemosiderin deposition. No change from prior exam.  2.  Remainder of the study is unremarkable.    EEG Results:     Routine EEG 9/4/2015  INTERPRETATION:  This is a normal awake and asleep EEG.     COMMENTS:  A normal EEG, does not rule out the presence of epilepsy.  Clinical   correlation is necessary.         Assessment/Plan:   Mir Cisneros is a 53 y.o. with possible epilepsy who is being seen in follow-up regarding seizures.  She has events without loss of consciousness that involve  an abnormal sensation of closing and followed by ~30 minutes of a dizzy sensation.  She has not had any events in 2018.  She had a normal routine EEG in 2015.  MRI epilepsy protocol was notable for a right frontal developmental venous anomaly which has been thought to be incidental rather than epileptogenic.  Given long-term seizure freedom and the possibility that these events are not epileptic in etiology, I do agree it would be reasonable to trial a wean of the Lamictal.  I have ordered a routine EEG to screen for epileptiform abnormalities.  If the findings are concerning for a risk of seizures, would recommend against weaning Lamictal.  Otherwise since the diagnosis is not clear we will plan for an  epilepsy monitoring unit admission to try to capture and characterize events and attempt to wean/stop Lamictal.  The plan was discussed in detail with patient.  I will get in touch with her about the results of the routine EEG and determine how she would like to proceed.  Lamictal refill provided.  Screening labs: CBC, CMP, vitamin D level, lamotrigine level. No driving restrictions given seizure freedom.    Follow-up in 6 months or sooner if needed.      Alexa English M.D.   Diplomate, Neurology with Special Qualification in Epilepsy, American Board of Psychiatry and Neurology   of Clinical Neurology, [facility]  Level III Epilepsy Center, Department of Neurology at [facility]   8/9/2023      During today's encounter we discussed available treatment options and their individual side effect profiles. Total encounter time caring for patient today 45 minutes.

## 2023-08-11 ENCOUNTER — OFFICE VISIT (OUTPATIENT)
Dept: NEUROLOGY | Facility: MEDICAL CENTER | Age: 53
End: 2023-08-11
Attending: STUDENT IN AN ORGANIZED HEALTH CARE EDUCATION/TRAINING PROGRAM
Payer: COMMERCIAL

## 2023-08-11 VITALS
HEART RATE: 76 BPM | BODY MASS INDEX: 32.87 KG/M2 | TEMPERATURE: 98.5 F | DIASTOLIC BLOOD PRESSURE: 78 MMHG | WEIGHT: 209.44 LBS | SYSTOLIC BLOOD PRESSURE: 128 MMHG | HEIGHT: 67 IN | OXYGEN SATURATION: 97 %

## 2023-08-11 DIAGNOSIS — R56.9 SEIZURE (HCC): ICD-10-CM

## 2023-08-11 PROCEDURE — 3078F DIAST BP <80 MM HG: CPT | Performed by: STUDENT IN AN ORGANIZED HEALTH CARE EDUCATION/TRAINING PROGRAM

## 2023-08-11 PROCEDURE — 99215 OFFICE O/P EST HI 40 MIN: CPT | Performed by: STUDENT IN AN ORGANIZED HEALTH CARE EDUCATION/TRAINING PROGRAM

## 2023-08-11 PROCEDURE — 3074F SYST BP LT 130 MM HG: CPT | Performed by: STUDENT IN AN ORGANIZED HEALTH CARE EDUCATION/TRAINING PROGRAM

## 2023-08-11 PROCEDURE — 99212 OFFICE O/P EST SF 10 MIN: CPT | Performed by: STUDENT IN AN ORGANIZED HEALTH CARE EDUCATION/TRAINING PROGRAM

## 2023-08-11 RX ORDER — LAMOTRIGINE 250 MG/1
TABLET, EXTENDED RELEASE ORAL
Qty: 90 TABLET | Refills: 3 | Status: SHIPPED
Start: 2023-08-11 | End: 2024-03-25

## 2023-08-11 ASSESSMENT — PATIENT HEALTH QUESTIONNAIRE - PHQ9: CLINICAL INTERPRETATION OF PHQ2 SCORE: 0

## 2023-08-27 ENCOUNTER — TELEMEDICINE (OUTPATIENT)
Dept: TELEHEALTH | Facility: TELEMEDICINE | Age: 53
End: 2023-08-27
Payer: COMMERCIAL

## 2023-08-27 VITALS — HEIGHT: 67 IN | WEIGHT: 190 LBS | BODY MASS INDEX: 29.82 KG/M2

## 2023-08-27 DIAGNOSIS — J32.9 SINUSITIS, UNSPECIFIED CHRONICITY, UNSPECIFIED LOCATION: ICD-10-CM

## 2023-08-27 DIAGNOSIS — J34.89 SINUS PRESSURE: ICD-10-CM

## 2023-08-27 PROCEDURE — 99213 OFFICE O/P EST LOW 20 MIN: CPT | Mod: 95 | Performed by: FAMILY MEDICINE

## 2023-08-27 RX ORDER — AMOXICILLIN AND CLAVULANATE POTASSIUM 875; 125 MG/1; MG/1
1 TABLET, FILM COATED ORAL 2 TIMES DAILY
Qty: 14 TABLET | Refills: 0 | Status: SHIPPED | OUTPATIENT
Start: 2023-08-27 | End: 2023-09-03

## 2023-08-27 RX ORDER — FLUTICASONE PROPIONATE 50 MCG
2 SPRAY, SUSPENSION (ML) NASAL DAILY
Qty: 1 G | Refills: 1 | Status: SHIPPED | OUTPATIENT
Start: 2023-08-27

## 2023-08-27 ASSESSMENT — ENCOUNTER SYMPTOMS
COUGH: 1
VOMITING: 0
SHORTNESS OF BREATH: 0
SORE THROAT: 0
FEVER: 0
SINUS PAIN: 1
SINUS PRESSURE: 1

## 2023-08-27 NOTE — PATIENT INSTRUCTIONS
Sinus Infection, Adult  A sinus infection is soreness and swelling (inflammation) of your sinuses. Sinuses are hollow spaces in the bones around your face. They are located:  Around your eyes.  In the middle of your forehead.  Behind your nose.  In your cheekbones.  Your sinuses and nasal passages are lined with a fluid called mucus. Mucus drains out of your sinuses. Swelling can trap mucus in your sinuses. This lets germs (bacteria, virus, or fungus) grow, which leads to infection. Most of the time, this condition is caused by a virus.  What are the causes?  Allergies.  Asthma.  Germs.  Things that block your nose or sinuses.  Growths in the nose (nasal polyps).  Chemicals or irritants in the air.  A fungus. This is rare.  What increases the risk?  Having a weak body defense system (immune system).  Doing a lot of swimming or diving.  Using nasal sprays too much.  Smoking.  What are the signs or symptoms?  The main symptoms of this condition are pain and a feeling of pressure around the sinuses. Other symptoms include:  Stuffy nose (congestion). This may make it hard to breathe through your nose.  Runny nose (drainage).  Soreness, swelling, and warmth in the sinuses.  A cough that may get worse at night.  Being unable to smell and taste.  Mucus that collects in the throat or the back of the nose (postnasal drip). This may cause a sore throat or bad breath.  Being very tired (fatigued).  A fever.  How is this diagnosed?  Your symptoms.  Your medical history.  A physical exam.  Tests to find out if your condition is short-term (acute) or long-term (chronic). Your doctor may:  Check your nose for growths (polyps).  Check your sinuses using a tool that has a light on one end (endoscope).  Check for allergies or germs.  Do imaging tests, such as an MRI or CT scan.  How is this treated?  Treatment for this condition depends on the cause and whether it is short-term or long-term.  If caused by a virus, your symptoms  should go away on their own within 10 days. You may be given medicines to relieve symptoms. They include:  Medicines that shrink swollen tissue in the nose.  A spray that treats swelling of the nostrils.  Rinses that help get rid of thick mucus in your nose (nasal saline washes).  Medicines that treat allergies (antihistamines).  Over-the-counter pain relievers.  If caused by bacteria, your doctor may wait to see if you will get better without treatment. You may be given antibiotic medicine if you have:  A very bad infection.  A weak body defense system.  If caused by growths in the nose, surgery may be needed.  Follow these instructions at home:  Medicines  Take, use, or apply over-the-counter and prescription medicines only as told by your doctor. These may include nasal sprays.  If you were prescribed an antibiotic medicine, take it as told by your doctor. Do not stop taking it even if you start to feel better.  Hydrate and humidify    Drink enough water to keep your pee (urine) pale yellow.  Use a cool mist humidifier to keep the humidity level in your home above 50%.  Breathe in steam for 10-15 minutes, 3-4 times a day, or as told by your doctor. You can do this in the bathroom while a hot shower is running.  Try not to spend time in cool or dry air.  Rest  Rest as much as you can.  Sleep with your head raised (elevated).  Make sure you get enough sleep each night.  General instructions    Put a warm, moist washcloth on your face 3-4 times a day, or as often as told by your doctor.  Use nasal saline washes as often as told by your doctor.  Wash your hands often with soap and water. If you cannot use soap and water, use hand .  Do not smoke. Avoid being around people who are smoking (secondhand smoke).  Keep all follow-up visits.  Contact a doctor if:  You have a fever.  Your symptoms get worse.  Your symptoms do not get better within 10 days.  Get help right away if:  You have a very bad headache.  You  cannot stop vomiting.  You have very bad pain or swelling around your face or eyes.  You have trouble seeing.  You feel confused.  Your neck is stiff.  You have trouble breathing.  These symptoms may be an emergency. Get help right away. Call 911.  Do not wait to see if the symptoms will go away.  Do not drive yourself to the hospital.  Summary  A sinus infection is swelling of your sinuses. Sinuses are hollow spaces in the bones around your face.  This condition is caused by tissues in your nose that become inflamed or swollen. This traps germs. These can lead to infection.  If you were prescribed an antibiotic medicine, take it as told by your doctor. Do not stop taking it even if you start to feel better.  Keep all follow-up visits.  This information is not intended to replace advice given to you by your health care provider. Make sure you discuss any questions you have with your health care provider.  Document Revised: 11/22/2022 Document Reviewed: 11/22/2022  Elsevier Patient Education © 2023 [employer].

## 2023-08-27 NOTE — PROGRESS NOTES
Subjective:   Mir Cisneros is a 53 y.o. female who presents for Sinus Problem (Covid test neg yesterday. Runny nose, congestion)        This evaluation was conducted via Zoom using secure and encrypted videoconferencing technology. The patient was in their home in the [facility].    The patient's identity was confirmed and verbal consent was obtained for this virtual visit.    Virtual Check-in Appointment Visit    Given the importance of social distancing and other strategies recommended to reduce the risk of COVID-19 transmission, I am providing medical care to this patient via a video virtual visit in place of an in person visit at the request of the patient.  Verbal consent to telehealth, risks, benefits, and consequences were discussed. Patient retains the right to withdraw at any time. All existing confidentiality protections apply. The patient has access to all transmitted medical information. No dissemination of any patient images or information to other entities without further written consent.            Sinus Problem  Chronicity: Reports sinus pain pressure over the past week, worsening symptoms last few days, reports recent negative COVID-19 home antigen testing. The current episode started in the past 7 days. The problem has been gradually worsening since onset. The pain is moderate. Associated symptoms include congestion, coughing (Minimal) and sinus pressure. Pertinent negatives include no shortness of breath or sore throat. (Reports similar symptoms with recurrent sinusitis and is currently being evaluated by otolaryngology) Treatments tried: Mucinex, saline rinse. The treatment provided no relief.     PMH:  has a past medical history of Allergic rhinitis, Anesthesia, Angioma, venous, Asthma in adult, Back pain, Breath shortness, Chickenpox, Cough, Daytime sleepiness, Epilepsy (HCC), Heart burn, Hypothyroidism, Kidney stone, Nasal drainage, Pain (05-09-12), Pain (5/27/2014), Painful  joint, Rhinitis, Seizure (HCC), Shortness of breath, Sleep apnea, Sore muscles, Sore throat, chronic, Unspecified disorder of thyroid, Wears glasses, and Wheezing.  MEDS:   Current Outpatient Medications:     amoxicillin-clavulanate (AUGMENTIN) 875-125 MG Tab, Take 1 Tablet by mouth 2 times a day for 7 days., Disp: 14 Tablet, Rfl: 0    fluticasone (FLONASE) 50 MCG/ACT nasal spray, Administer 2 Sprays into affected nostril(S) every day., Disp: 1 g, Rfl: 1    LamoTRIgine 250 MG TABLET SR 24 HR, TAKE 1 TABLET BY MOUTH EVERY MORNING, Disp: 90 Tablet, Rfl: 3    SYNTHROID 88 MCG Tab, Take 88 mcg by mouth Every morning on an empty stomach., Disp: , Rfl:     Probiotic Product (PROBIOTIC PO), Take  by mouth every day., Disp: , Rfl:     Flaxseed, Linseed, (FLAXSEED OIL PO), Take  by mouth every bedtime. Unsure of dose , Disp: , Rfl:     Coenzyme Q10 (CO Q 10 PO), Take  by mouth every bedtime. Unsure of dose , Disp: , Rfl:     Multiple Vitamin (MULTIVITAMIN PO), Take  by mouth every bedtime., Disp: , Rfl:   ALLERGIES: No Known Allergies  SURGHX:   Past Surgical History:   Procedure Laterality Date    SHOULDER DECOMPRESSION ARTHROSCOPIC  5/29/2014    Performed by Mendel Guzman M.D. at SURGERY [facility] ORS    CLAVICLE DISTAL EXCISION  5/29/2014    Performed by Mendel Guzman M.D. at SURGERY [facility] ORS    SHOULDER ARTHROSCOPY W/ ROTATOR CUFF REPAIR  5/29/2014    Performed by Mendel Guzman M.D. at SURGERY [facility] ORS    NERVE ULNAR TRANSFER  5/11/2012    Performed by ROXY HAMILTON at SURGERY SAME DAY [facility] ORS    EPICONDYLAR STRIPPING  5/11/2012    Performed by ROXY HAMILTON at SURGERY SAME DAY [facility] ORS    DILATION AND CURETTAGE  12/2005    D & C    PRIMARY C SECTION  2000    WRIST ARTHROSCOPY  1997    left wrist reconstruction    SHOULDER ARTHROSCOPY  1993    left acromioplasty     SOCHX:  reports that she quit smoking about 23 years ago. Her smoking use included cigarettes. She started smoking  "about 28 years ago. She has a 1.3 pack-year smoking history. She has never used smokeless tobacco. She reports current alcohol use. She reports that she does not use drugs.  FH:   Family History   Problem Relation Age of Onset    Asthma Mother     Other Mother         A Fib    Other Father         Parkinsons    Hypertension Other      Review of Systems   Constitutional:  Negative for fever.   HENT:  Positive for congestion, sinus pressure and sinus pain. Negative for sore throat.    Respiratory:  Positive for cough (Minimal). Negative for shortness of breath.    Gastrointestinal:  Negative for vomiting.        Objective:   Ht 1.702 m (5' 7\")   Wt 86.2 kg (190 lb)   BMI 29.76 kg/m²   Physical Exam  Vitals and nursing note reviewed.   Constitutional:       Appearance: Normal appearance.   HENT:      Head: Normocephalic.      Right Ear: External ear normal.      Left Ear: External ear normal.   Eyes:      Conjunctiva/sclera: Conjunctivae normal.   Pulmonary:      Effort: No respiratory distress.   Skin:     Findings: No rash.   Neurological:      Mental Status: She is alert and oriented to person, place, and time.           Assessment/Plan:   1. Sinusitis, unspecified chronicity, unspecified location  - amoxicillin-clavulanate (AUGMENTIN) 875-125 MG Tab; Take 1 Tablet by mouth 2 times a day for 7 days.  Dispense: 14 Tablet; Refill: 0  - fluticasone (FLONASE) 50 MCG/ACT nasal spray; Administer 2 Sprays into affected nostril(S) every day.  Dispense: 1 g; Refill: 1    2. Sinus pressure  - amoxicillin-clavulanate (AUGMENTIN) 875-125 MG Tab; Take 1 Tablet by mouth 2 times a day for 7 days.  Dispense: 14 Tablet; Refill: 0  - fluticasone (FLONASE) 50 MCG/ACT nasal spray; Administer 2 Sprays into affected nostril(S) every day.  Dispense: 1 g; Refill: 1        Medical Decision Making/Course:  In the course of preparing for this visit with review of the pertinent past medical history, recent and past clinic visits, current " medications, and performing chart, immunization, medical history and medication reconciliation, and in the further course of obtaining the current history pertinent to the clinic visit today, performing an exam and evaluation, ordering and independently evaluating labs, tests  , and/or procedures, prescribing any recommended new medications as noted above, providing any pertinent counseling and education and recommending further coordination of care including recommendations for symptomatic and supportive measures and recommendations to continue follow-up with otolaryngology as scheduled, at least  14 minutes of total time were spent during this encounter.        Differential diagnosis, natural history, supportive care, and indications for immediate follow-up discussed.     Advised the patient to follow-up with the primary care physician for recheck, reevaluation, and consideration of further management.    Please note that this dictation was created using voice recognition software. I have worked with consultants from the vendor as well as technical experts from [employer] [employer] to optimize the interface. I have made every reasonable attempt to correct obvious errors, but I expect that there are errors of grammar and possibly content that I did not discover before finalizing the note.

## 2023-09-08 ENCOUNTER — HOSPITAL ENCOUNTER (OUTPATIENT)
Dept: RADIOLOGY | Facility: MEDICAL CENTER | Age: 53
End: 2023-09-08
Attending: OTOLARYNGOLOGY
Payer: COMMERCIAL

## 2023-09-08 ENCOUNTER — NON-PROVIDER VISIT (OUTPATIENT)
Dept: NEUROLOGY | Facility: MEDICAL CENTER | Age: 53
End: 2023-09-08
Attending: STUDENT IN AN ORGANIZED HEALTH CARE EDUCATION/TRAINING PROGRAM
Payer: COMMERCIAL

## 2023-09-08 DIAGNOSIS — J32.0 CHRONIC MAXILLARY SINUSITIS: ICD-10-CM

## 2023-09-08 DIAGNOSIS — R56.9 SEIZURE (HCC): ICD-10-CM

## 2023-09-08 PROCEDURE — 95819 EEG AWAKE AND ASLEEP: CPT | Performed by: STUDENT IN AN ORGANIZED HEALTH CARE EDUCATION/TRAINING PROGRAM

## 2023-09-08 PROCEDURE — 70486 CT MAXILLOFACIAL W/O DYE: CPT

## 2023-09-08 NOTE — PROCEDURES
OUTPATIENT ROUTINE VIDEO ELECTROENCEPHALOGRAM REPORT    REFERRING PROVIDER: Alexa English M.D.  [address] [address]  [place] [id number]  DOS: 09/08/2023    STUDY DURATION: 0 hours and 27 minutes of total recording time.     INDICATION:  Mir Cisneros 53 y.o. female presenting with seizure(s)    RELEVANT MEDICATIONS/TREATMENTS:   Current Outpatient Medications   Medication Instructions    Coenzyme Q10 (CO Q 10 PO) EVERY BEDTIME    Flaxseed, Linseed, (FLAXSEED OIL PO) EVERY BEDTIME    fluticasone (FLONASE) 100 mcg, Nasal, DAILY    LamoTRIgine 250 MG TABLET SR 24 HR TAKE 1 TABLET BY MOUTH EVERY MORNING    Multiple Vitamin (MULTIVITAMIN PO) EVERY BEDTIME    Probiotic Product (PROBIOTIC PO) DAILY    Synthroid 88 mcg, Oral, EACH MORNING ON EMPTY STOMACH        TECHNIQUE:   Routine VEEG was set up by a Neurodiagnostic technologist who performed education to the patient and staff. A minimum of 23 electrodes and 23 channel recording was setup and performed by Neurodiagnostic technologist, in accordance with the international 10-20 system. The study was reviewed in bipolar and referential montages. The recording examined the patient in the  awake, drowsy, and sleep state(s).     DESCRIPTION OF THE RECORD:  During wakefulness, the background was continuous and showed a 11 Hz posterior dominant rhythm.  There was reactivity to eye closure/opening.  An anterior-posterior gradient was noted with faster beta frequencies seen anteriorly.  During drowsiness, theta/delta frequencies were seen.    EEG Sleep: Sleep was captured and was characterized by diffuse background delta/theta activity with a loss of myogenic artifact.  N2 sleep transients in the form of sleep spindles and vertex waves were seen in the leads over the central regions.     ICTAL AND INTERICTAL FINDINGS:   1) Occasional bifrontal spike waves, maximal Fp2. Morphologically, these have features of artifact however given field and presence more than  once on the study they are favored to represent spike waves.     Two examples of #1, sensitivity 7 uV/mm      No regional slowing or persistent focal asymmetries were seen.    No seizures.     ACTIVATION PROCEDURES:   Hyperventilation was performed by the patient for a total of 3 minutes. The technician performing the test noted good effort. This technique did not elicited any abnormalities on EEG.  and Intermittent Photic stimulation was performed in a stepwise fashion from 1 to 30 Hz and did not elicited any abnormalities on EEG.     EKG: Sampling of the EKG recording did not demonstrate any abnormalities    EVENTS:  None    INTERPRETATION:   Abnormal video EEG recording in the awake, drowsy, and sleep state(s) due to:  Occasional right maximal bifrontal spike waves suggestive of a predisposition for seizures to arise from this region. No seizures or epileptiform discharges. Clinical correlation recommended.     Alexa English MD  Department of Neurology at [facility]  General Neurologist and Epileptologist   of Clinical Neurology, [facility].

## 2023-09-12 DIAGNOSIS — R94.01 ABNORMAL EEG: ICD-10-CM

## 2023-09-12 NOTE — PROGRESS NOTES
Ambulatory EEG 24 hours ordered after discussing results of routine EEG with patient via myChart.

## 2023-10-17 ENCOUNTER — APPOINTMENT (OUTPATIENT)
Dept: NEUROLOGY | Facility: MEDICAL CENTER | Age: 53
End: 2023-10-17
Attending: STUDENT IN AN ORGANIZED HEALTH CARE EDUCATION/TRAINING PROGRAM
Payer: COMMERCIAL

## 2023-10-18 ENCOUNTER — APPOINTMENT (OUTPATIENT)
Dept: NEUROLOGY | Facility: MEDICAL CENTER | Age: 53
End: 2023-10-18
Attending: STUDENT IN AN ORGANIZED HEALTH CARE EDUCATION/TRAINING PROGRAM
Payer: COMMERCIAL

## 2023-11-10 ENCOUNTER — HOSPITAL ENCOUNTER (OUTPATIENT)
Dept: RADIOLOGY | Facility: MEDICAL CENTER | Age: 53
End: 2023-11-10
Attending: OBSTETRICS & GYNECOLOGY
Payer: COMMERCIAL

## 2023-11-10 DIAGNOSIS — Z12.31 SCREENING MAMMOGRAM, ENCOUNTER FOR: ICD-10-CM

## 2023-11-10 PROCEDURE — 77063 BREAST TOMOSYNTHESIS BI: CPT

## 2023-12-02 ENCOUNTER — OFFICE VISIT (OUTPATIENT)
Dept: URGENT CARE | Facility: PHYSICIAN GROUP | Age: 53
End: 2023-12-02
Payer: COMMERCIAL

## 2023-12-02 ENCOUNTER — HOSPITAL ENCOUNTER (OUTPATIENT)
Dept: RADIOLOGY | Facility: MEDICAL CENTER | Age: 53
End: 2023-12-02
Attending: NURSE PRACTITIONER
Payer: COMMERCIAL

## 2023-12-02 VITALS
WEIGHT: 200 LBS | TEMPERATURE: 98.6 F | OXYGEN SATURATION: 99 % | HEART RATE: 79 BPM | DIASTOLIC BLOOD PRESSURE: 78 MMHG | RESPIRATION RATE: 16 BRPM | SYSTOLIC BLOOD PRESSURE: 124 MMHG | HEIGHT: 67 IN | BODY MASS INDEX: 31.39 KG/M2

## 2023-12-02 DIAGNOSIS — S29.011A MUSCLE STRAIN OF CHEST WALL, INITIAL ENCOUNTER: ICD-10-CM
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
## 2023-12-02 DIAGNOSIS — R07.81 RIB PAIN ON LEFT SIDE: ICD-10-CM

## 2023-12-02 DIAGNOSIS — S59.901A INJURY OF RIGHT ELBOW, INITIAL ENCOUNTER: ICD-10-CM

## 2023-12-02 DIAGNOSIS — S50.01XA CONTUSION OF RIGHT ELBOW, INITIAL ENCOUNTER: ICD-10-CM

## 2023-12-02 PROCEDURE — 3074F SYST BP LT 130 MM HG: CPT | Performed by: NURSE PRACTITIONER

## 2023-12-02 PROCEDURE — 99213 OFFICE O/P EST LOW 20 MIN: CPT | Performed by: NURSE PRACTITIONER

## 2023-12-02 PROCEDURE — 73080 X-RAY EXAM OF ELBOW: CPT | Mod: RT

## 2023-12-02 PROCEDURE — 71101 X-RAY EXAM UNILAT RIBS/CHEST: CPT | Mod: LT

## 2023-12-02 PROCEDURE — 3078F DIAST BP <80 MM HG: CPT | Performed by: NURSE PRACTITIONER

## 2023-12-02 ASSESSMENT — ENCOUNTER SYMPTOMS
SENSORY CHANGE: 0
FALLS: 1
TINGLING: 0
WEAKNESS: 0
BRUISES/BLEEDS EASILY: 0
MYALGIAS: 1

## 2023-12-02 NOTE — PROGRESS NOTES
Subjective     Manal Heena Cisneros is a 53 y.o. female who presents with Fall (Fell on concrete, right arm in pain and restricted mobility/X couple hours )            Fall  Pertinent negatives include no tingling.   States that she was walking her dog have the Mirena this evening when she tripped and fall over her dog falling on her left side.  States experiencing left-sided rib pain as well as right elbow pain after fall.  States did not take any over-the-counter pain reliever or apply ice as she came over to clinic after incident.  History of bilateral shoulder surgeries.  States unable to move her right elbow.    PMH:  has a past medical history of Allergic rhinitis, Anesthesia, Angioma, venous, Asthma in adult, Back pain, Breath shortness, Chickenpox, Cough, Daytime sleepiness, Epilepsy (HCC), Heart burn, Hypothyroidism, Kidney stone, Nasal drainage, Pain (05-09-12), Pain (5/27/2014), Painful joint, Rhinitis, Seizure (HCC), Shortness of breath, Sleep apnea, Sore muscles, Sore throat, chronic, Unspecified disorder of thyroid, Wears glasses, and Wheezing.  MEDS:   Current Outpatient Medications:     fluticasone (FLONASE) 50 MCG/ACT nasal spray, Administer 2 Sprays into affected nostril(S) every day., Disp: 1 g, Rfl: 1    LamoTRIgine 250 MG TABLET SR 24 HR, TAKE 1 TABLET BY MOUTH EVERY MORNING, Disp: 90 Tablet, Rfl: 3    SYNTHROID 88 MCG Tab, Take 88 mcg by mouth Every morning on an empty stomach., Disp: , Rfl:     Probiotic Product (PROBIOTIC PO), Take  by mouth every day., Disp: , Rfl:     Flaxseed, Linseed, (FLAXSEED OIL PO), Take  by mouth every bedtime. Unsure of dose , Disp: , Rfl:     Coenzyme Q10 (CO Q 10 PO), Take  by mouth every bedtime. Unsure of dose , Disp: , Rfl:     Multiple Vitamin (MULTIVITAMIN PO), Take  by mouth every bedtime., Disp: , Rfl:   ALLERGIES: No Known Allergies  SURGHX:   Past Surgical History:   Procedure Laterality Date    SHOULDER DECOMPRESSION ARTHROSCOPIC  5/29/2014     "Performed by Mendel Guzman M.D. at SURGERY [facility] ORS    CLAVICLE DISTAL EXCISION  5/29/2014    Performed by Mendel Guzman M.D. at SURGERY [facility]    SHOULDER ARTHROSCOPY W/ ROTATOR CUFF REPAIR  5/29/2014    Performed by Mendel Guzman M.D. at SURGERY [facility]    NERVE ULNAR TRANSFER  5/11/2012    Performed by ROXY HAMILTON at SURGERY SAME DAY [facility]    EPICONDYLAR STRIPPING  5/11/2012    Performed by ROXY HAMILTON at SURGERY SAME DAY [facility] ORS    DILATION AND CURETTAGE  12/2005    D & C    PRIMARY C SECTION  2000    WRIST ARTHROSCOPY  1997    left wrist reconstruction    SHOULDER ARTHROSCOPY  1993    left acromioplasty     SOCHX:  reports that she quit smoking about 23 years ago. Her smoking use included cigarettes. She started smoking about 28 years ago. She has a 1.3 pack-year smoking history. She has never used smokeless tobacco. She reports current alcohol use. She reports that she does not use drugs.  FH: Family history was reviewed, no pertinent findings to report      Review of Systems   Musculoskeletal:  Positive for falls, joint pain and myalgias.   Skin:  Negative for itching and rash.   Neurological:  Negative for tingling, sensory change and weakness.   Endo/Heme/Allergies:  Does not bruise/bleed easily.   All other systems reviewed and are negative.             Objective     /78   Pulse 79   Temp 37 °C (98.6 °F) (Temporal)   Resp 16   Ht 1.702 m (5' 7\")   Wt 90.7 kg (200 lb)   SpO2 99%   BMI 31.32 kg/m²      Physical Exam  Vitals reviewed.   Constitutional:       General: She is awake. She is not in acute distress.     Appearance: Normal appearance. She is well-developed. She is not ill-appearing, toxic-appearing or diaphoretic.   HENT:      Head: Normocephalic.   Cardiovascular:      Rate and Rhythm: Normal rate.   Pulmonary:      Effort: Pulmonary effort is normal. No tachypnea, accessory muscle usage or respiratory distress.   Chest:      Chest " wall: Tenderness present. No lacerations, deformity, swelling, crepitus or edema.          Comments: Tenderness on palpation of her anterior left side rib cage at approximate ribs 7-8.  No bruising seen at this site.  Musculoskeletal:      Right elbow: No swelling, deformity or effusion. Decreased range of motion. Tenderness present.      Cervical back: Normal range of motion and neck supple.      Comments: Tenderness on palpation of olecranon bone without swelling, deformity, bruising but does have small abrasion at the site.  Tenderness on palpation along proximal right forearm as well as distal humerus.  Decreased range of motion with flexion and extension.  Will hold up right arm with left hand.   Skin:     General: Skin is warm and dry.      Findings: Abrasion and signs of injury present. No bruising, erythema, laceration or wound.   Neurological:      Mental Status: She is alert and oriented to person, place, and time.   Psychiatric:         Attention and Perception: Attention normal.         Mood and Affect: Mood normal.         Speech: Speech normal.         Behavior: Behavior normal. Behavior is cooperative.                             Assessment & Plan        1. Fall from slip, trip, or stumble, initial encounter    - DX-ELBOW-COMPLETE 3+ RIGHT; Future  - [id number] (WITH 1-VIEW CXR) LEFT; Future  - Referral to Sports Medicine    2. Injury of right elbow, initial encounter    - DX-ELBOW-COMPLETE 3+ RIGHT; Future  - Referral to Sports Medicine    3. Rib pain on left side    - [id number] (WITH 1-VIEW CXR) LEFT; Future  - Referral to Sports Medicine    4. Contusion of right elbow, initial encounter    - Referral to Sports Medicine    5. Muscle strain of chest wall, initial encounter    - Referral to Sports Medicine     Declines arm sling, declines muscle relaxant at this time  -Take over the counter NSAID/Tylenol as needed for additional pain relief  -May use cool compresses for any swelling  /throbbing pain and warm compresses for muscle stiffness   -May perform gentle back muscle stretches as tolerated after warm compresses to maintain mobility, avoid abdominal crunches, heavy lifting or repetitive motions  -May use arm sling as needed (patient has this at home already)  -May apply topical analgesics as needed (preferred lidocaine based topical like salon Pas)  -Perform proper body mechanics with lifting, twisting, bending and walking. Ask for assistance with heavy objects as needed   -Monitor for numbness/tingling in upper extremities, decreased range of carlos with lifting difficulty- need re-evaluation  Follow-up with sports medicine as needed

## 2023-12-05 ENCOUNTER — TELEPHONE (OUTPATIENT)
Dept: HEALTH INFORMATION MANAGEMENT | Facility: OTHER | Age: 53
End: 2023-12-05

## 2023-12-11 ENCOUNTER — TELEPHONE (OUTPATIENT)
Dept: HEALTH INFORMATION MANAGEMENT | Facility: OTHER | Age: 53
End: 2023-12-11

## 2023-12-15 ENCOUNTER — HOSPITAL ENCOUNTER (OUTPATIENT)
Dept: RADIOLOGY | Facility: MEDICAL CENTER | Age: 53
End: 2023-12-15
Attending: OBSTETRICS & GYNECOLOGY
Payer: COMMERCIAL

## 2023-12-15 DIAGNOSIS — R92.2 DENSE BREASTS: ICD-10-CM

## 2023-12-15 DIAGNOSIS — R92.30 DENSE BREASTS: ICD-10-CM

## 2023-12-15 PROCEDURE — 76641 ULTRASOUND BREAST COMPLETE: CPT

## 2024-01-09 ENCOUNTER — NON-PROVIDER VISIT (OUTPATIENT)
Dept: NEUROLOGY | Facility: MEDICAL CENTER | Age: 54
End: 2024-01-09
Attending: STUDENT IN AN ORGANIZED HEALTH CARE EDUCATION/TRAINING PROGRAM
Payer: COMMERCIAL

## 2024-01-09 DIAGNOSIS — R94.01 ABNORMAL EEG: ICD-10-CM

## 2024-01-09 DIAGNOSIS — R56.9 SEIZURE (HCC): ICD-10-CM

## 2024-01-09 PROCEDURE — 95708 EEG WO VID EA 12-26HR UNMNTR: CPT | Performed by: STUDENT IN AN ORGANIZED HEALTH CARE EDUCATION/TRAINING PROGRAM

## 2024-01-09 PROCEDURE — 95700 EEG CONT REC W/VID EEG TECH: CPT | Performed by: STUDENT IN AN ORGANIZED HEALTH CARE EDUCATION/TRAINING PROGRAM

## 2024-01-09 PROCEDURE — 95719 EEG PHYS/QHP EA INCR W/O VID: CPT | Performed by: STUDENT IN AN ORGANIZED HEALTH CARE EDUCATION/TRAINING PROGRAM

## 2024-01-09 NOTE — PROCEDURES
AMBULATORY ELECTROENCEPHALOGRAM REPORT      REFERRING PROVIDER:  Alexa English M.D.  [address] [facility] [place] [address]  DOS: 01/09/2024   DURATION OF RECORDING: (22 hours and 59 minutes)    INDICATION:  Mir Cisneros 53 y.o. female presenting with  seizure(s)    CURRENT OUTPATIENT MEDICATION LIST:   Current Outpatient Medications   Medication Instructions    Coenzyme Q10 (CO Q 10 PO) EVERY BEDTIME    Flaxseed, Linseed, (FLAXSEED OIL PO) EVERY BEDTIME    fluticasone (FLONASE) 100 mcg, Nasal, DAILY    LamoTRIgine 250 MG TABLET SR 24 HR TAKE 1 TABLET BY MOUTH EVERY MORNING    Multiple Vitamin (MULTIVITAMIN PO) EVERY BEDTIME    Probiotic Product (PROBIOTIC PO) DAILY    Synthroid 88 mcg, Oral, EACH MORNING ON EMPTY STOMACH       TECHNIQUE:   The EEG was set up and taken down by a Neurodiagnostic technologist who performed education to patient and staff.  A minimum but not limited to 23 electrodes and 23 channel recording was setup and performed by Neurodiagnostic technologist. Impedances, electrode integrity, and technical impressions were documented a minimum of every 2-24 hour period by a Neurodiagnostic Technologist and reviewed by Interpreting Physician.    DESCRIPTION OF THE RECORD:  During maximal wakefulness, the background was continuous and showed a 11 Hz posterior dominant rhythm.  Reactivity and state changes were present.  During drowsiness, theta/delta frequencies were seen.    Sleep was captured and was characterized by diffuse background delta/theta activity with a loss of myogenic artifact.  N2 sleep transients in the form of sleep spindles and vertex waves were seen in the leads over the central regions.     ICTAL AND INTERICTAL FINDINGS:   No focal or generalized epileptiform activity noted.     No regional slowing or persistent focal asymmetries were seen.    No seizures.    ACTIVATION PROCEDURES:   Hyperventilation was performed by the patient for a total of 3 minutes. The  technician performing the test noted good effort. This technique did not elicited any abnormalities on EEG.  and Intermittent Photic stimulation was performed in a stepwise fashion from 1 to 30 Hz and did not elicited any abnormalities on EEG.     EKG: Sampling of the EKG recording showed sinus rhythm    EVENTS:  Push button events and/or ambulatory diary events: none    INTERPRETATION:  Normal ambulatory EEG recording in the awake and drowsy/sleep state(s):  -No regional slowing or persistent focal asymmetries were seen.  -No epileptiform discharges or other epileptiform phenomena seen.  -No seizures. Clinical correlation is recommended.  -Clinical Events: none      Alexa English M.D.   Diplomate, Neurology with Special Qualification in Epilepsy, American Board of Psychiatry and Neurology   of Clinical Neurology, [facility]  Level III Epilepsy Center, Department of Neurology at [facility]   1/11/2024

## 2024-01-10 ENCOUNTER — NON-PROVIDER VISIT (OUTPATIENT)
Dept: NEUROLOGY | Facility: MEDICAL CENTER | Age: 54
End: 2024-01-10
Attending: STUDENT IN AN ORGANIZED HEALTH CARE EDUCATION/TRAINING PROGRAM
Payer: COMMERCIAL

## 2024-01-18 ENCOUNTER — APPOINTMENT (RX ONLY)
Dept: URBAN - METROPOLITAN AREA CLINIC 22 | Facility: CLINIC | Age: 54
Setting detail: DERMATOLOGY
End: 2024-01-18

## 2024-01-18 DIAGNOSIS — Z71.89 OTHER SPECIFIED COUNSELING: ICD-10-CM

## 2024-01-18 DIAGNOSIS — I78.8 OTHER DISEASES OF CAPILLARIES: ICD-10-CM

## 2024-01-18 DIAGNOSIS — L81.4 OTHER MELANIN HYPERPIGMENTATION: ICD-10-CM

## 2024-01-18 PROBLEM — D23.39 OTHER BENIGN NEOPLASM OF SKIN OF OTHER PARTS OF FACE: Status: ACTIVE | Noted: 2024-01-18

## 2024-01-18 PROCEDURE — ? COUNSELING

## 2024-01-18 PROCEDURE — 99213 OFFICE O/P EST LOW 20 MIN: CPT

## 2024-01-18 PROCEDURE — ? LIQUID NITROGEN

## 2024-01-18 PROCEDURE — ? ADDITIONAL NOTES

## 2024-01-18 ASSESSMENT — LOCATION SIMPLE DESCRIPTION DERM
LOCATION SIMPLE: LEFT CHEEK
LOCATION SIMPLE: RIGHT CHEEK
LOCATION SIMPLE: LEFT ZYGOMA
LOCATION SIMPLE: NOSE
LOCATION SIMPLE: LEFT FOREHEAD

## 2024-01-18 ASSESSMENT — LOCATION ZONE DERM
LOCATION ZONE: NOSE
LOCATION ZONE: FACE

## 2024-01-18 ASSESSMENT — LOCATION DETAILED DESCRIPTION DERM
LOCATION DETAILED: NASAL SUPRATIP
LOCATION DETAILED: LEFT INFERIOR MEDIAL FOREHEAD
LOCATION DETAILED: RIGHT CENTRAL MALAR CHEEK
LOCATION DETAILED: LEFT INFERIOR CENTRAL MALAR CHEEK
LOCATION DETAILED: LEFT MEDIAL ZYGOMA

## 2024-01-18 NOTE — PROCEDURE: LIQUID NITROGEN
Render Note In Bullet Format When Appropriate: No
Duration Of Freeze Thaw-Cycle (Seconds): 0
Medical Necessity Information: It is in your best interest to select a reason for this procedure from the list below. All of these items fulfill various CMS LCD requirements except the new and changing color options.
Post-Care Instructions: I reviewed with the patient in detail post-care instructions. Patient is to wear sunprotection, and avoid picking at any of the treated lesions. Pt may apply Vaseline to crusted or scabbing areas.
Detail Level: Detailed
Show Spray Paint Technique Variable?: Yes
Medical Necessity Clause: This procedure was medically necessary because the lesions that were treated were:
Spray Paint Text: The liquid nitrogen was applied to the skin utilizing a spray paint frosting technique.
Consent: The patient's consent was obtained including but not limited to risks of crusting, scabbing, blistering, scarring, darker or lighter pigmentary change, recurrence, incomplete removal and infection.

## 2024-01-22 ENCOUNTER — TELEPHONE (OUTPATIENT)
Dept: NEUROLOGY | Facility: MEDICAL CENTER | Age: 54
End: 2024-01-22
Payer: COMMERCIAL

## 2024-01-22 NOTE — TELEPHONE ENCOUNTER
01/22/24 Called and left a voicemail for patient to call me back regarding making a F/U  appointment with Dr English. HL

## 2024-01-23 ENCOUNTER — TELEPHONE (OUTPATIENT)
Dept: NEUROLOGY | Facility: MEDICAL CENTER | Age: 54
End: 2024-01-23
Payer: COMMERCIAL

## 2024-01-23 NOTE — TELEPHONE ENCOUNTER
VOICEMAIL  1. Caller Name: Mir                      Call Back Number: [phone]    2. Message: Mir returned my call to schedule her F/U appointment    3. Patient approves office to leave a detailed voicemail/MyChart message: yes    I RETURNED PATIENTS CALL AND LEFT A VOICEMAIL LETTING HER KNOW I WAS CALLING TO SCHEDULE HER APPOINTMENT AND ASKED HER TO CALL ME BACK. I LEFT MY DIRECT NUMBER SO SHE COULD REACH ME.

## 2024-01-25 ENCOUNTER — TELEPHONE (OUTPATIENT)
Dept: NEUROLOGY | Facility: MEDICAL CENTER | Age: 54
End: 2024-01-25
Payer: COMMERCIAL

## 2024-01-25 NOTE — TELEPHONE ENCOUNTER
01/25/24 Called and left a voicemail for patient to schedule a F/U appointment with Dr English. Left the office number for her to call. HL

## 2024-02-05 ENCOUNTER — HOSPITAL ENCOUNTER (OUTPATIENT)
Dept: LAB | Facility: MEDICAL CENTER | Age: 54
End: 2024-02-05
Attending: FAMILY MEDICINE
Payer: COMMERCIAL

## 2024-02-05 LAB
ALBUMIN SERPL BCP-MCNC: 4.1 G/DL (ref 3.2–4.9)
ALBUMIN/GLOB SERPL: 1.4 G/DL
ALP SERPL-CCNC: 107 U/L (ref 30–99)
ALT SERPL-CCNC: 11 U/L (ref 2–50)
ANION GAP SERPL CALC-SCNC: 11 MMOL/L (ref 7–16)
AST SERPL-CCNC: 11 U/L (ref 12–45)
BASOPHILS # BLD AUTO: 0.4 % (ref 0–1.8)
BASOPHILS # BLD: 0.04 K/UL (ref 0–0.12)
BILIRUB SERPL-MCNC: 0.9 MG/DL (ref 0.1–1.5)
BUN SERPL-MCNC: 8 MG/DL (ref 8–22)
CALCIUM ALBUM COR SERPL-MCNC: 9.3 MG/DL (ref 8.5–10.5)
CALCIUM SERPL-MCNC: 9.4 MG/DL (ref 8.5–10.5)
CHLORIDE SERPL-SCNC: 107 MMOL/L (ref 96–112)
CHOLEST SERPL-MCNC: 168 MG/DL (ref 100–199)
CO2 SERPL-SCNC: 25 MMOL/L (ref 20–33)
CREAT SERPL-MCNC: 0.78 MG/DL (ref 0.5–1.4)
EOSINOPHIL # BLD AUTO: 0.11 K/UL (ref 0–0.51)
EOSINOPHIL NFR BLD: 1.2 % (ref 0–6.9)
ERYTHROCYTE [DISTWIDTH] IN BLOOD BY AUTOMATED COUNT: 41.3 FL (ref 35.9–50)
GFR SERPLBLD CREATININE-BSD FMLA CKD-EPI: 90 ML/MIN/1.73 M 2
GLOBULIN SER CALC-MCNC: 2.9 G/DL (ref 1.9–3.5)
GLUCOSE SERPL-MCNC: 107 MG/DL (ref 65–99)
HCT VFR BLD AUTO: 45.5 % (ref 37–47)
HDLC SERPL-MCNC: 61 MG/DL
HGB BLD-MCNC: 14.9 G/DL (ref 12–16)
IMM GRANULOCYTES # BLD AUTO: 0.07 K/UL (ref 0–0.11)
IMM GRANULOCYTES NFR BLD AUTO: 0.7 % (ref 0–0.9)
LDLC SERPL CALC-MCNC: 88 MG/DL
LYMPHOCYTES # BLD AUTO: 2.52 K/UL (ref 1–4.8)
LYMPHOCYTES NFR BLD: 26.5 % (ref 22–41)
MCH RBC QN AUTO: 28.5 PG (ref 27–33)
MCHC RBC AUTO-ENTMCNC: 32.7 G/DL (ref 32.2–35.5)
MCV RBC AUTO: 87 FL (ref 81.4–97.8)
MONOCYTES # BLD AUTO: 1 K/UL (ref 0–0.85)
MONOCYTES NFR BLD AUTO: 10.5 % (ref 0–13.4)
NEUTROPHILS # BLD AUTO: 5.76 K/UL (ref 1.82–7.42)
NEUTROPHILS NFR BLD: 60.7 % (ref 44–72)
NRBC # BLD AUTO: 0 K/UL
NRBC BLD-RTO: 0 /100 WBC (ref 0–0.2)
PLATELET # BLD AUTO: 287 K/UL (ref 164–446)
PMV BLD AUTO: 10.5 FL (ref 9–12.9)
POTASSIUM SERPL-SCNC: 4.7 MMOL/L (ref 3.6–5.5)
PROT SERPL-MCNC: 7 G/DL (ref 6–8.2)
RBC # BLD AUTO: 5.23 M/UL (ref 4.2–5.4)
SODIUM SERPL-SCNC: 143 MMOL/L (ref 135–145)
TRIGL SERPL-MCNC: 93 MG/DL (ref 0–149)
TSH SERPL DL<=0.005 MIU/L-ACNC: 0.29 UIU/ML (ref 0.38–5.33)
WBC # BLD AUTO: 9.5 K/UL (ref 4.8–10.8)

## 2024-02-05 PROCEDURE — 80061 LIPID PANEL: CPT

## 2024-02-05 PROCEDURE — 84443 ASSAY THYROID STIM HORMONE: CPT

## 2024-02-05 PROCEDURE — 36415 COLL VENOUS BLD VENIPUNCTURE: CPT

## 2024-02-05 PROCEDURE — 85025 COMPLETE CBC W/AUTO DIFF WBC: CPT

## 2024-02-05 PROCEDURE — 80053 COMPREHEN METABOLIC PANEL: CPT

## 2024-02-19 ENCOUNTER — HOSPITAL ENCOUNTER (OUTPATIENT)
Dept: RADIOLOGY | Facility: MEDICAL CENTER | Age: 54
End: 2024-02-19
Attending: FAMILY MEDICINE
Payer: COMMERCIAL

## 2024-02-19 DIAGNOSIS — R10.11 RIGHT UPPER QUADRANT ABDOMINAL PAIN: ICD-10-CM

## 2024-02-19 DIAGNOSIS — R94.8 NONSPECIFIC ABNORMAL RESULTS OF FUNCTION STUDY: ICD-10-CM

## 2024-02-19 PROCEDURE — 76705 ECHO EXAM OF ABDOMEN: CPT

## 2024-03-24 NOTE — PROGRESS NOTES
"[facility] Neurology Epilepsy Center  Follow up visit    Patient name: Mir Cisneros  YOB: 1970  MRN: [id number]  Date of visit: 3/25/2024      Background:    Mir Cisneros is a 53 y.o. right handed woman who is being seen in follow up for probable seizures. She is taking Lamictal 250 mg XR nightly and has been event free for since 2018.     Details of history per Dr. Mcleod:  Dominant hand: right handed      In brief, her pertinent medical history is remarkable for stable right frontal venous angioma/developmental venous anomaly with most recent MRI in .      Seizure type 1 - focal seizure aware      The onset of seizure was , she was sitting on the stool and sudden onset room spinning and fell off, had difficulty speaking.   She also developed bad headache at that time.   Then she started to have \"simple partial seizure\"  The ictal behavior was stereotyped and described as closed in for 30 seconds --> post-ictal feels dizzy sensation for 30 minutes, fatigue afterwards. No LOC.  Frequency: she was having fairly frequent on weekly basis.  The last seizure was in 2018        The longest seizure free period was 3 years   The seizures were isolated. There was               no cluster of seizures,               no history of status epilepticus     Special features:  They were noted only during the wake, there was no specific timing.      Potential triggering factors were reviewed              Sleep deprivation              Alcohol              Stress              Periods              Other     Epilepsy risk factors:      -Positive: None  -Negative: Normal prenatal and  course. Normal development physically and intellectually. No febrile convulsions. No history of severe head trauma. No meningitis. No stroke. No alcohol abuse. No significant exposure to recreational drugs. There is no family history of epilepsy, or spells.         Current AEDs:  LTG  mg qam      Previous " "workup:     1. EEG data: 9/2015 (Dr. Ordoñez) normal awake and sleep EEG     2. Neuroimaging data: MRI brain 2020 Right frontal venous angioma/developmental venous anomaly again seen. No evidence of interval hemorrhage or hemosiderin deposition. No change from prior exam.     3. Prior antiepileptic medications were reviewed  Lamotrigine (Lamictal)            Interval history:  The patient is unaccompanied to the visit and provides a history independently.     She has been doing well overall with no breakthrough seizures.  Her last seizure was in 2018.  She is hoping to be able to come off of the Lamictal if possible.  The diagnosis of epilepsy was made based on the presence of a postictal period though it was not fully clear whether the episodes she was having were seizures. She underwent a routine EEG which was concerning for frontal sharp vs artifact thus subsequently underwent 24h ambulatory EEG which was normal.     No history of depressed mood. She has been affected emotionally [place], family is from [place], though she does not report overt symptoms of depressed mood. No history of suicidal ideation.     She is currently driving.         Past Medical History:   Past Medical History:   Diagnosis Date    Pain 5/27/2014    right shoulder    Pain 05-09-12    neck to left wrist, 4/10    Allergic rhinitis     Anesthesia     \"severe nausea\"    Angioma, venous     Asthma in adult     mild    Back pain     Breath shortness     Chickenpox     Cough     Daytime sleepiness     Epilepsy (HCC)     Heart burn     Hypothyroidism     Kidney stone     Nasal drainage     Painful joint     Rhinitis     Seizure (HCC)     \"simple, partial\", last seizure 01/2013    Shortness of breath     Sleep apnea     Sore muscles     Sore throat, chronic     Unspecified disorder of thyroid     hypothyroid    Wears glasses     Wheezing        Current Medications:   Current Outpatient Medications:     albuterol (PROVENTIL) " 2.5mg/3ml Nebu Soln solution for nebulization, , Disp: , Rfl:     LamoTRIgine (LAMICTAL XR) 200 MG TABLET SR 24 HR, Take 1 Tablet by mouth every day., Disp: 30 Tablet, Rfl: 1    fluticasone (FLONASE) 50 MCG/ACT nasal spray, Administer 2 Sprays into affected nostril(S) every day., Disp: 1 g, Rfl: 1    Probiotic Product (PROBIOTIC PO), Take  by mouth every day., Disp: , Rfl:     Flaxseed, Linseed, (FLAXSEED OIL PO), Take  by mouth every bedtime. Unsure of dose , Disp: , Rfl:     Coenzyme Q10 (CO Q 10 PO), Take  by mouth every bedtime. Unsure of dose , Disp: , Rfl:     Multiple Vitamin (MULTIVITAMIN PO), Take  by mouth every bedtime., Disp: , Rfl:     SYNTHROID 88 MCG Tab, Take 88 mcg by mouth Every morning on an empty stomach., Disp: , Rfl:     Allergies: No Known Allergies      Physical Exam:   Ambulatory Vitals  Vitals:    03/25/24 0729   BP: 134/82   Pulse: 86   Temp: 36.2 °C (97.2 °F)   SpO2: 97%         Constitutional: Well-developed, well-nourished, good hygiene. Appears stated age..   Skin: Warm, dry, intact. No rashes observed.  Neurologic:   Mental Status: Awake, alert, oriented x 3.   Speech: Fluent with normal prosody.   Memory: Able to recall recent and remote events accurately.    Concentration: Attentive. Able to focus on history and follow multi-step commands.   Fund of Knowledge: Appropriate.   Cranial Nerves:    CN II: PERRL     CN III, IV, VI: EOMI without nystagmus    CN VII: No facial asymmetry    CN VIII: Hearing intact to voice   Motor: moving all 4 extremities fully and equally    Gait: ambulates steadily without assistive device   Movements: No resting tremors or abnormal movements observed.     Studies:      Labs reviewed:      Imaging:     MRI brain wwo contrast, 3T epilepsy protocol 7/22/2020  IMPRESSION:     1.  Right frontal venous angioma/developmental venous anomaly again seen. No evidence of interval hemorrhage or hemosiderin deposition. No change from prior exam.  2.  Remainder of the  study is unremarkable.    EEG Results:   Ambulatory EEG 1/9/2024  INTERPRETATION:  Normal ambulatory EEG recording in the awake and drowsy/sleep state(s):  -No regional slowing or persistent focal asymmetries were seen.  -No epileptiform discharges or other epileptiform phenomena seen.  -No seizures. Clinical correlation is recommended.  -Clinical Events: none    Routine EEG 9/8/2023  INTERPRETATION:   Abnormal video EEG recording in the awake, drowsy, and sleep state(s) due to:  Occasional right maximal bifrontal spike waves suggestive of a predisposition for seizures to arise from this region. No seizures or epileptiform discharges. Clinical correlation recommended.     Routine EEG 9/4/2015  INTERPRETATION:  This is a normal awake and asleep EEG.     COMMENTS:  A normal EEG, does not rule out the presence of epilepsy.  Clinical   correlation is necessary.         Assessment/Plan:   Mir Cisneros is a 53 y.o. with possible epilepsy who is being seen in follow-up regarding seizures.  She has a history of events without loss of consciousness that involve  an abnormal sensation of closing and followed by ~30 minutes of a dizzy sensation.  She has not had any events since 2018.  She had a normal routine EEG in 2015.  MRI epilepsy protocol was notable for a right frontal developmental venous anomaly which has been thought to be incidental rather than epileptogenic.  Recent ambulatory EEG was normal, suggesting that the abnormalities reporte on routine EEG may have been artifactual.    Given long-term seizure freedom and the possibility that these events are not definitively epileptic in etiology, it is reasonable to trial a wean of the Lamictal. Discussed the option of EMU monitoring for wean; since the events did not involve loss of consciousness and her overall stability over years I believe it is safe to wean slowly on an outpatient basis.     Advised patient to decrease from Lamictal  mg nightly to  200 mg nightly for one month and to closely track symptoms. If she remains free of typical previous episodes we can decrease to 150 mg nightly for 1 month. Also advised her to note any mood changes as Lamictal has mood stabilizing properties.     Lamictal and vitamin D levels ordered in routine screening. No driving restrictions given seizure freedom.     Follow-up in 3 months.    Alexa English M.D.   Diplomate, Neurology with Special Qualification in Epilepsy, American Board of Psychiatry and Neurology   of Clinical Neurology, [facility] [employer]  Level III Epilepsy Center, Department of Neurology at [facility]       During today's encounter we discussed available treatment options and their individual side effect profiles. Total encounter time caring for patient today 30 minutes.

## 2024-03-25 ENCOUNTER — OFFICE VISIT (OUTPATIENT)
Dept: NEUROLOGY | Facility: MEDICAL CENTER | Age: 54
End: 2024-03-25
Attending: STUDENT IN AN ORGANIZED HEALTH CARE EDUCATION/TRAINING PROGRAM
Payer: COMMERCIAL

## 2024-03-25 VITALS
OXYGEN SATURATION: 97 % | DIASTOLIC BLOOD PRESSURE: 82 MMHG | HEART RATE: 86 BPM | TEMPERATURE: 97.2 F | HEIGHT: 67 IN | WEIGHT: 210.98 LBS | BODY MASS INDEX: 33.11 KG/M2 | SYSTOLIC BLOOD PRESSURE: 134 MMHG

## 2024-03-25 DIAGNOSIS — R56.9 SEIZURE (HCC): ICD-10-CM

## 2024-03-25 PROCEDURE — 99214 OFFICE O/P EST MOD 30 MIN: CPT | Performed by: STUDENT IN AN ORGANIZED HEALTH CARE EDUCATION/TRAINING PROGRAM

## 2024-03-25 PROCEDURE — 99212 OFFICE O/P EST SF 10 MIN: CPT | Performed by: STUDENT IN AN ORGANIZED HEALTH CARE EDUCATION/TRAINING PROGRAM

## 2024-03-25 PROCEDURE — 3075F SYST BP GE 130 - 139MM HG: CPT | Performed by: STUDENT IN AN ORGANIZED HEALTH CARE EDUCATION/TRAINING PROGRAM

## 2024-03-25 PROCEDURE — 3079F DIAST BP 80-89 MM HG: CPT | Performed by: STUDENT IN AN ORGANIZED HEALTH CARE EDUCATION/TRAINING PROGRAM

## 2024-03-25 RX ORDER — LAMOTRIGINE 200 MG/1
200 TABLET, EXTENDED RELEASE ORAL DAILY
Qty: 30 TABLET | Refills: 1 | Status: SHIPPED | OUTPATIENT
Start: 2024-03-25

## 2024-03-25 RX ORDER — ALBUTEROL SULFATE 2.5 MG/3ML
SOLUTION RESPIRATORY (INHALATION)
COMMUNITY

## 2024-03-25 ASSESSMENT — PATIENT HEALTH QUESTIONNAIRE - PHQ9
5. POOR APPETITE OR OVEREATING: 1 - SEVERAL DAYS
CLINICAL INTERPRETATION OF PHQ2 SCORE: 2
SUM OF ALL RESPONSES TO PHQ QUESTIONS 1-9: 8

## 2024-03-25 ASSESSMENT — FIBROSIS 4 INDEX: FIB4 SCORE: 0.62

## 2024-03-25 NOTE — PATIENT INSTRUCTIONS
-Decrease Lamictal to 200 mg XR daily for 1 month  -If no episodes concerning for seizures we will plan to decrease to 150 mg daily for 1 month

## 2024-05-06 ENCOUNTER — HOSPITAL ENCOUNTER (OUTPATIENT)
Dept: LAB | Facility: MEDICAL CENTER | Age: 54
End: 2024-05-06
Attending: FAMILY MEDICINE
Payer: COMMERCIAL

## 2024-05-06 LAB
25(OH)D3 SERPL-MCNC: 47 NG/ML (ref 30–100)
T3FREE SERPL-MCNC: 2.91 PG/ML (ref 2–4.4)
T4 FREE SERPL-MCNC: 1.45 NG/DL (ref 0.93–1.7)
TSH SERPL DL<=0.005 MIU/L-ACNC: 1.83 UIU/ML (ref 0.38–5.33)

## 2024-05-29 DIAGNOSIS — R56.9 SEIZURE (HCC): ICD-10-CM

## 2024-05-29 NOTE — TELEPHONE ENCOUNTER
Received request via: Pharmacy    Medication Name/Dosage LamoTRIgine (LAMICTAL XR) 200 MG TABLET SR 24 HR     When was medication last prescribed 03/25/24    How many refills were previously provided 1    How many Refills does he patient have left from last prescription 0    Was the patient seen in the last year in this department? Yes   Date of last office visit 03/25/24     Per last Neurology Office Visit, when was the date of next follow up visit set for?                            Date of office visit follow up request 3 months      Does the patient have an upcoming appointment? Yes   If yes, when 06/10/24             If no, schedule appointment N/A     Does the patient have [facility] and need 100 day supply (blood pressure, diabetes and cholesterol meds only)? Patient does not have SCP

## 2024-05-30 RX ORDER — LAMOTRIGINE 200 MG/1
1 TABLET, EXTENDED RELEASE ORAL
Qty: 30 TABLET | Refills: 0 | Status: SHIPPED | OUTPATIENT
Start: 2024-05-30

## 2024-06-10 ENCOUNTER — APPOINTMENT (OUTPATIENT)
Dept: NEUROLOGY | Facility: MEDICAL CENTER | Age: 54
End: 2024-06-10
Attending: STUDENT IN AN ORGANIZED HEALTH CARE EDUCATION/TRAINING PROGRAM
Payer: COMMERCIAL

## 2024-06-11 NOTE — PROGRESS NOTES
"[facility] Neurology Epilepsy Center  Follow up visit    Patient name: Mir Cisneros  YOB: 1970  MRN: [id number]  Date of visit: 2024    Background:    Mir Cisneros is a 53 y.o. right handed woman who is being seen in follow up for probable seizures. She is taking Lamictal 250 mg XR nightly and has been event free for since 2018.     Details of history per Dr. Mcleod:  Dominant hand: right handed      In brief, her pertinent medical history is remarkable for stable right frontal venous angioma/developmental venous anomaly with most recent MRI in .      Seizure type 1 - focal seizure aware      The onset of seizure was , she was sitting on the stool and sudden onset room spinning and fell off, had difficulty speaking.   She also developed bad headache at that time.   Then she started to have \"simple partial seizure\"  The ictal behavior was stereotyped and described as closed in for 30 seconds --> post-ictal feels dizzy sensation for 30 minutes, fatigue afterwards. No LOC.  Frequency: she was having fairly frequent on weekly basis.  The last seizure was in 2018        The longest seizure free period was 3 years   The seizures were isolated. There was               no cluster of seizures,               no history of status epilepticus     Special features:  They were noted only during the wake, there was no specific timing.      Potential triggering factors were reviewed              Sleep deprivation              Alcohol              Stress              Periods              Other     Epilepsy risk factors:      -Positive: None  -Negative: Normal prenatal and  course. Normal development physically and intellectually. No febrile convulsions. No history of severe head trauma. No meningitis. No stroke. No alcohol abuse. No significant exposure to recreational drugs. There is no family history of epilepsy, or spells.         Current AEDs:  LTG  mg qam      Previous " "workup:     1. EEG data: 9/2015 (Dr. Ordoñez) normal awake and sleep EEG     2. Neuroimaging data: MRI brain 2020 Right frontal venous angioma/developmental venous anomaly again seen. No evidence of interval hemorrhage or hemosiderin deposition. No change from prior exam.     3. Prior antiepileptic medications were reviewed  Lamotrigine (Lamictal)      Visit 3/25/24:  She has been doing well overall with no breakthrough seizures.  Her last seizure was in 2018.  She is hoping to be able to come off of the Lamictal if possible.  The diagnosis of epilepsy was made based on the presence of a postictal period though it was not fully clear whether the episodes she was having were seizures. She underwent a routine EEG which was concerning for frontal sharp vs artifact thus subsequently underwent 24h ambulatory EEG which was normal.     Advised patient to decrease from Lamictal  mg nightly to 200 mg nightly for one month and to closely track symptoms. If she remains free of typical previous episodes we can decrease to 150 mg nightly for 1 month.     Interval history:  The patient is unaccompanied to the visit and provides a history independently.     She decreased Lamictal XR from 250 mg daily to 200 mg. She is having an easier time going to sleep at night with this change.     She denies a history of any abnormal confusional episodes, extremity jerking/shaking, witnessed staring spells, cognitive changes, or other events concerning for breakthrough seizures.     No changes in mood on lower dose of Lamictal.     She is currently driving.         Past Medical History:   Past Medical History:   Diagnosis Date    Pain 5/27/2014    right shoulder    Pain 05-09-12    neck to left wrist, 4/10    Allergic rhinitis     Anesthesia     \"severe nausea\"    Angioma, venous     Asthma in adult     mild    Back pain     Breath shortness     Chickenpox     Cough     Daytime sleepiness     Epilepsy (HCC)     Heart burn     " "Hypothyroidism     Kidney stone     Nasal drainage     Painful joint     Rhinitis     Seizure (HCC)     \"simple, partial\", last seizure 01/2013    Shortness of breath     Sleep apnea     Sore muscles     Sore throat, chronic     Unspecified disorder of thyroid     hypothyroid    Wears glasses     Wheezing        Current Medications:   Current Outpatient Medications:     LamoTRIgine 100 MG TABLET SR 24 HR, Take 100 mg by mouth every day., Disp: 60 Tablet, Rfl: 0    LamoTRIgine 50 MG TABLET SR 24 HR, Take 50 mg by mouth every day., Disp: 60 Tablet, Rfl: 0    albuterol (PROVENTIL) 2.5mg/3ml Nebu Soln solution for nebulization, , Disp: , Rfl:     fluticasone (FLONASE) 50 MCG/ACT nasal spray, Administer 2 Sprays into affected nostril(S) every day., Disp: 1 g, Rfl: 1    SYNTHROID 88 MCG Tab, Take 88 mcg by mouth Every morning on an empty stomach., Disp: , Rfl:     Probiotic Product (PROBIOTIC PO), Take  by mouth every day., Disp: , Rfl:     Flaxseed, Linseed, (FLAXSEED OIL PO), Take  by mouth every bedtime. Unsure of dose , Disp: , Rfl:     Coenzyme Q10 (CO Q 10 PO), Take  by mouth every bedtime. Unsure of dose , Disp: , Rfl:     Multiple Vitamin (MULTIVITAMIN PO), Take  by mouth every bedtime., Disp: , Rfl:     Allergies: No Known Allergies      Physical Exam:   Ambulatory Vitals  Vitals:    06/12/24 0825   BP: 120/78   Pulse: 76   Temp: 35.9 °C (96.7 °F)   SpO2: 96%     Constitutional: Well-developed, well-nourished, good hygiene. Appears stated age..   Skin: Warm, dry, intact. No rashes observed.  Neurologic:   Mental Status: Awake, alert, oriented x 3.   Speech: Fluent with normal prosody.   Memory: Able to recall recent and remote events accurately.    Concentration: Attentive. Able to focus on history and follow multi-step commands.   Fund of Knowledge: Appropriate.   Cranial Nerves:    CN II: PERRL     CN III, IV, VI: EOMI without nystagmus    CN VII: No facial asymmetry    CN VIII: Hearing intact to voice   Motor: " moving all 4 extremities fully and equally    Gait: ambulates steadily without assistive device   Movements: No resting tremors or abnormal movements observed.     Studies:      Labs reviewed:      Imaging:     MRI brain wwo contrast, 3T epilepsy protocol 7/22/2020  IMPRESSION:     1.  Right frontal venous angioma/developmental venous anomaly again seen. No evidence of interval hemorrhage or hemosiderin deposition. No change from prior exam.  2.  Remainder of the study is unremarkable.    EEG Results:   Ambulatory EEG 1/9/2024  INTERPRETATION:  Normal ambulatory EEG recording in the awake and drowsy/sleep state(s):  -No regional slowing or persistent focal asymmetries were seen.  -No epileptiform discharges or other epileptiform phenomena seen.  -No seizures. Clinical correlation is recommended.  -Clinical Events: none    Routine EEG 9/8/2023  INTERPRETATION:   Abnormal video EEG recording in the awake, drowsy, and sleep state(s) due to:  Occasional right maximal bifrontal spike waves suggestive of a predisposition for seizures to arise from this region. No seizures or epileptiform discharges. Clinical correlation recommended.     Routine EEG 9/4/2015  INTERPRETATION:  This is a normal awake and asleep EEG.     COMMENTS:  A normal EEG, does not rule out the presence of epilepsy.  Clinical   correlation is necessary.         Assessment/Plan:   Mir Cisneros is a 53 y.o. with possible epilepsy who is being seen in follow-up regarding seizures.  She has a history of events without loss of consciousness that involve an abnormal sensation of closing and followed by ~30 minutes of a dizzy sensation.  She has not had any events since 2018.  She had a normal routine EEG in 2015.  MRI epilepsy protocol was notable for a right frontal developmental venous anomaly which has been thought to be incidental rather than epileptogenic.  Ambulatory EEG 9/2023 was normal, suggesting that the abnormalities reported on routine  EEG may have been artifactual.    Given long-term seizure freedom and the possibility that these events are not definitively epileptic in etiology, it is reasonable to trial a wean of the Lamictal. Discussed the option of EMU monitoring for wean; since the events did not involve loss of consciousness and her overall stability over years I believe it is safe to wean slowly on an outpatient basis.     Interval history:  Decreased from Lamictal  mg nightly to 200 mg daily since last visit. No symptoms concerning for breakthrough seizures. No changes in mood with adjustment of dose.     Plan to decrease to 150 mg daily for 2 months. Patient advised to closely track symptoms and report any concern for breakthrough seizures to the clinic.     If stable at 150 mg daily, will plan to further decrease to 100 mg daily with a repeat 24h ambulatory EEG at that time. This can be done without an interval office visit if she remains stable clinically.      Follow-up in 6 months, or sooner if needed.     Alexa English M.D.   Diplomate, Neurology with Special Qualification in Epilepsy, American Board of Psychiatry and Neurology   of Clinical Neurology, [facility] [employer]  Level III Epilepsy Center, Department of Neurology at [facility]       During today's encounter we discussed available treatment options and their individual side effect profiles. Total encounter time caring for patient today 20 minutes.

## 2024-06-12 ENCOUNTER — OFFICE VISIT (OUTPATIENT)
Dept: NEUROLOGY | Facility: MEDICAL CENTER | Age: 54
End: 2024-06-12
Attending: STUDENT IN AN ORGANIZED HEALTH CARE EDUCATION/TRAINING PROGRAM
Payer: COMMERCIAL

## 2024-06-12 VITALS
DIASTOLIC BLOOD PRESSURE: 78 MMHG | OXYGEN SATURATION: 96 % | TEMPERATURE: 96.7 F | BODY MASS INDEX: 32.7 KG/M2 | HEIGHT: 67 IN | SYSTOLIC BLOOD PRESSURE: 120 MMHG | HEART RATE: 76 BPM | WEIGHT: 208.34 LBS

## 2024-06-12 DIAGNOSIS — Z87.898 HISTORY OF SEIZURE: ICD-10-CM

## 2024-06-12 DIAGNOSIS — R56.9 SEIZURE (HCC): ICD-10-CM

## 2024-06-12 PROCEDURE — 99212 OFFICE O/P EST SF 10 MIN: CPT | Performed by: STUDENT IN AN ORGANIZED HEALTH CARE EDUCATION/TRAINING PROGRAM

## 2024-06-12 PROCEDURE — 3078F DIAST BP <80 MM HG: CPT | Performed by: STUDENT IN AN ORGANIZED HEALTH CARE EDUCATION/TRAINING PROGRAM

## 2024-06-12 PROCEDURE — 99213 OFFICE O/P EST LOW 20 MIN: CPT | Performed by: STUDENT IN AN ORGANIZED HEALTH CARE EDUCATION/TRAINING PROGRAM

## 2024-06-12 PROCEDURE — 3074F SYST BP LT 130 MM HG: CPT | Performed by: STUDENT IN AN ORGANIZED HEALTH CARE EDUCATION/TRAINING PROGRAM

## 2024-06-12 RX ORDER — LAMOTRIGINE 100 MG/1
100 TABLET, EXTENDED RELEASE ORAL DAILY
Qty: 60 TABLET | Refills: 0 | Status: SHIPPED | OUTPATIENT
Start: 2024-06-12

## 2024-06-12 RX ORDER — LAMOTRIGINE 50 MG/1
50 TABLET, EXTENDED RELEASE ORAL DAILY
Qty: 60 TABLET | Refills: 0 | Status: SHIPPED | OUTPATIENT
Start: 2024-06-12

## 2024-06-12 ASSESSMENT — FIBROSIS 4 INDEX: FIB4 SCORE: 0.62

## 2024-07-14 ENCOUNTER — OFFICE VISIT (OUTPATIENT)
Dept: URGENT CARE | Facility: PHYSICIAN GROUP | Age: 54
End: 2024-07-14
Payer: COMMERCIAL

## 2024-07-14 VITALS
OXYGEN SATURATION: 98 % | WEIGHT: 208.11 LBS | HEART RATE: 82 BPM | BODY MASS INDEX: 32.66 KG/M2 | RESPIRATION RATE: 15 BRPM | SYSTOLIC BLOOD PRESSURE: 112 MMHG | DIASTOLIC BLOOD PRESSURE: 76 MMHG | HEIGHT: 67 IN | TEMPERATURE: 97.4 F

## 2024-07-14 DIAGNOSIS — L03.116 CELLULITIS OF LEFT LOWER EXTREMITY: ICD-10-CM

## 2024-07-14 PROCEDURE — 3078F DIAST BP <80 MM HG: CPT | Performed by: FAMILY MEDICINE

## 2024-07-14 PROCEDURE — 3074F SYST BP LT 130 MM HG: CPT | Performed by: FAMILY MEDICINE

## 2024-07-14 PROCEDURE — 99213 OFFICE O/P EST LOW 20 MIN: CPT | Performed by: FAMILY MEDICINE

## 2024-07-14 RX ORDER — SULFAMETHOXAZOLE AND TRIMETHOPRIM 800; 160 MG/1; MG/1
1 TABLET ORAL 2 TIMES DAILY
Qty: 14 TABLET | Refills: 0 | Status: SHIPPED | OUTPATIENT
Start: 2024-07-14 | End: 2024-07-21

## 2024-07-14 RX ORDER — TRIAMCINOLONE ACETONIDE 1 MG/G
1 OINTMENT TOPICAL 2 TIMES DAILY
Qty: 15 G | Refills: 0 | Status: SHIPPED | OUTPATIENT
Start: 2024-07-14 | End: 2024-07-21

## 2024-07-14 ASSESSMENT — FIBROSIS 4 INDEX: FIB4 SCORE: 0.62

## 2024-07-15 DIAGNOSIS — Z87.898 HISTORY OF SEIZURE: ICD-10-CM

## 2024-07-19 RX ORDER — LAMOTRIGINE 100 MG/1
100 TABLET, EXTENDED RELEASE ORAL DAILY
Qty: 60 TABLET | Refills: 0 | Status: SHIPPED | OUTPATIENT
Start: 2024-07-19

## 2024-07-19 RX ORDER — LAMOTRIGINE 50 MG/1
50 TABLET, EXTENDED RELEASE ORAL DAILY
Qty: 60 TABLET | Refills: 0 | Status: SHIPPED | OUTPATIENT
Start: 2024-07-19

## 2024-09-12 ENCOUNTER — APPOINTMENT (OUTPATIENT)
Dept: SLEEP MEDICINE | Facility: MEDICAL CENTER | Age: 54
End: 2024-09-12
Payer: COMMERCIAL

## 2024-09-22 ENCOUNTER — APPOINTMENT (OUTPATIENT)
Dept: URGENT CARE | Facility: PHYSICIAN GROUP | Age: 54
End: 2024-09-22

## 2024-11-11 ENCOUNTER — OFFICE VISIT (OUTPATIENT)
Dept: NEUROLOGY | Facility: MEDICAL CENTER | Age: 54
End: 2024-11-11
Attending: STUDENT IN AN ORGANIZED HEALTH CARE EDUCATION/TRAINING PROGRAM
Payer: COMMERCIAL

## 2024-11-11 VITALS
SYSTOLIC BLOOD PRESSURE: 110 MMHG | DIASTOLIC BLOOD PRESSURE: 60 MMHG | TEMPERATURE: 97.1 F | HEIGHT: 67 IN | RESPIRATION RATE: 16 BRPM | OXYGEN SATURATION: 94 % | WEIGHT: 210.1 LBS | BODY MASS INDEX: 32.98 KG/M2 | HEART RATE: 76 BPM

## 2024-11-11 DIAGNOSIS — G40.109 FOCAL EPILEPSY (HCC): ICD-10-CM

## 2024-11-11 DIAGNOSIS — Z87.898 HISTORY OF SEIZURE: ICD-10-CM

## 2024-11-11 DIAGNOSIS — R45.89 DEPRESSED MOOD: ICD-10-CM

## 2024-11-11 PROCEDURE — 3074F SYST BP LT 130 MM HG: CPT | Performed by: STUDENT IN AN ORGANIZED HEALTH CARE EDUCATION/TRAINING PROGRAM

## 2024-11-11 PROCEDURE — 3078F DIAST BP <80 MM HG: CPT | Performed by: STUDENT IN AN ORGANIZED HEALTH CARE EDUCATION/TRAINING PROGRAM

## 2024-11-11 PROCEDURE — 99212 OFFICE O/P EST SF 10 MIN: CPT | Performed by: STUDENT IN AN ORGANIZED HEALTH CARE EDUCATION/TRAINING PROGRAM

## 2024-11-11 PROCEDURE — 99214 OFFICE O/P EST MOD 30 MIN: CPT | Performed by: STUDENT IN AN ORGANIZED HEALTH CARE EDUCATION/TRAINING PROGRAM

## 2024-11-11 RX ORDER — LAMOTRIGINE 100 MG/1
2 TABLET, EXTENDED RELEASE ORAL
Qty: 180 TABLET | Refills: 2 | Status: SHIPPED | OUTPATIENT
Start: 2024-11-11

## 2024-11-11 ASSESSMENT — PATIENT HEALTH QUESTIONNAIRE - PHQ9
5. POOR APPETITE OR OVEREATING: 0 - NOT AT ALL
SUM OF ALL RESPONSES TO PHQ QUESTIONS 1-9: 12
CLINICAL INTERPRETATION OF PHQ2 SCORE: 4

## 2024-11-11 ASSESSMENT — FIBROSIS 4 INDEX: FIB4 SCORE: 0.64

## 2024-11-11 NOTE — PROGRESS NOTES
"[facility]  Follow up visit    Patient name: Mir Cisneros  YOB: 1970  MRN: [id number]  Date of visit: 2024    Background:    Mir Cisneros is a 54 y.o. right handed woman who is being seen in follow up for episodes clinically consistent with epileptic seizures.     Details of history per Dr. Mcleod:  Dominant hand: right handed      In brief, her pertinent medical history is remarkable for stable right frontal venous angioma/developmental venous anomaly with most recent MRI in .      Seizure type 1 - focal seizure aware      The onset of seizure was , she was sitting on the stool and sudden onset room spinning and fell off, had difficulty speaking.   She also developed bad headache at that time.   Then she started to have \"simple partial seizure\"  The ictal behavior was stereotyped and described as closed in for 30 seconds --> post-ictal feels dizzy sensation for 30 minutes, fatigue afterwards. No LOC.  Frequency: she was having fairly frequent on weekly basis.  The last seizure was in         The longest seizure free period was 3 years   The seizures were isolated. There was               no cluster of seizures,               no history of status epilepticus     Special features:  They were noted only during the wake, there was no specific timing.      Potential triggering factors were reviewed              Sleep deprivation              Alcohol              Stress              Periods              Other     Epilepsy risk factors:      -Positive: None  -Negative: Normal prenatal and  course. Normal development physically and intellectually. No febrile convulsions. No history of severe head trauma. No meningitis. No stroke. No alcohol abuse. No significant exposure to recreational drugs. There is no family history of epilepsy, or spells.         Current AEDs:  LTG  mg qam      Previous workup:     1. EEG data: 2015 (Dr. Ordoñez) " "normal awake and sleep EEG     2. Neuroimaging data: MRI brain 2020 Right frontal venous angioma/developmental venous anomaly again seen. No evidence of interval hemorrhage or hemosiderin deposition. No change from prior exam.     3. Prior antiepileptic medications were reviewed  Lamotrigine (Lamictal)      Visit 3/25/24:  She has been doing well overall with no breakthrough seizures.  Her last seizure was in 2018.  She is hoping to be able to come off of the Lamictal if possible.  The diagnosis of epilepsy was made based on the presence of a postictal period though it was not fully clear whether the episodes she was having were seizures. She underwent a routine EEG which was concerning for frontal sharp vs. artifact thus subsequently underwent 24h ambulatory EEG which was normal.     Advised patient to decrease from Lamictal  mg nightly to 200 mg nightly for one month and to closely track symptoms. If she remains free of typical previous episodes we can decrease to 150 mg nightly for 1 month.     6/2024 visit:   She decreased Lamictal XR from 250 mg daily to 200 mg - sleep improved.     Interval history:  The patient is unaccompanied to the visit and provides a history independently.     She has had a recurrence of auras since decreasing from 200mg to 150 mg.    Aura symptoms: sense of \"things closing in\", earth shifting for a second. Mildly nauseated for a few minutes afterward.     She is taking ambien for sleep, prescribed by PCP.    Mood is \"not great\", she has experienced a great deal of stress related to the crisis in the [place] due to family/close ties to the region. Mood has possibly been worse since she decreased the Lamictal dose, but it coincided with situational factors affecting mood as well. No suicidal ideation or thoughts.     She is currently driving.     She is employed. Runs a nonprofit organization.    Past Medical History:   Past Medical History:   Diagnosis Date    Pain 5/27/2014    " "right shoulder    Pain 05-09-12    neck to left wrist, 4/10    Allergic rhinitis     Anesthesia     \"severe nausea\"    Angioma, venous     Asthma in adult     mild    Back pain     Breath shortness     Chickenpox     Cough     Daytime sleepiness     Epilepsy (HCC)     Heart burn     Hypothyroidism     Kidney stone     Nasal drainage     Painful joint     Rhinitis     Seizure (HCC)     \"simple, partial\", last seizure 01/2013    Shortness of breath     Sleep apnea     Sore muscles     Sore throat, chronic     Unspecified disorder of thyroid     hypothyroid    Wears glasses     Wheezing        Current Medications:   Current Outpatient Medications:     LamoTRIgine 100 MG TABLET SR 24 HR, Take 2 Tablets by mouth every day., Disp: 180 Tablet, Rfl: 2    albuterol (PROVENTIL) 2.5mg/3ml Nebu Soln solution for nebulization, , Disp: , Rfl:     fluticasone (FLONASE) 50 MCG/ACT nasal spray, Administer 2 Sprays into affected nostril(S) every day., Disp: 1 g, Rfl: 1    Probiotic Product (PROBIOTIC PO), Take  by mouth every day., Disp: , Rfl:     Flaxseed, Linseed, (FLAXSEED OIL PO), Take  by mouth every bedtime. Unsure of dose , Disp: , Rfl:     Coenzyme Q10 (CO Q 10 PO), Take  by mouth every bedtime. Unsure of dose , Disp: , Rfl:     Multiple Vitamin (MULTIVITAMIN PO), Take  by mouth every bedtime., Disp: , Rfl:     SYNTHROID 88 MCG Tab, Take 88 mcg by mouth Every morning on an empty stomach., Disp: , Rfl:     Allergies: No Known Allergies      Physical Exam:   Ambulatory Vitals  Vitals:    11/11/24 0811   BP: 110/60   Pulse: 76   Resp: 16   Temp: 36.2 °C (97.1 °F)   SpO2: 94%       Constitutional: Well-developed, well-nourished, good hygiene. Appears stated age..   Skin: Warm, dry, intact. No rashes observed.  Neurologic:   Mental Status: Awake, alert, oriented x 3.   Speech: Fluent with normal prosody.   Memory: Able to recall recent and remote events accurately.    Concentration: Attentive. Able to focus on history and follow " multi-step commands.   Fund of Knowledge: Appropriate.   Cranial Nerves:    CN II: PERRL     CN III, IV, VI: EOMI without nystagmus    CN VII: No facial asymmetry    CN VIII: Hearing intact to voice   Motor: moving all 4 extremities fully and equally    Gait: ambulates steadily without assistive device   Movements: No resting tremors or abnormal movements observed.     Studies:      Imaging:     MRI brain wwo contrast, 3T epilepsy protocol 7/22/2020  IMPRESSION:     1.  Right frontal venous angioma/developmental venous anomaly again seen. No evidence of interval hemorrhage or hemosiderin deposition. No change from prior exam.  2.  Remainder of the study is unremarkable.    EEG Results:   Ambulatory EEG 1/9/2024  INTERPRETATION:  Normal ambulatory EEG recording in the awake and drowsy/sleep state(s):  -No regional slowing or persistent focal asymmetries were seen.  -No epileptiform discharges or other epileptiform phenomena seen.  -No seizures. Clinical correlation is recommended.  -Clinical Events: none    Routine EEG 9/8/2023  INTERPRETATION:   Abnormal video EEG recording in the awake, drowsy, and sleep state(s) due to:  Occasional right maximal bifrontal spike waves suggestive of a predisposition for seizures to arise from this region. No seizures or epileptiform discharges. Clinical correlation recommended.     Routine EEG 9/4/2015  INTERPRETATION:  This is a normal awake and asleep EEG.     COMMENTS:  A normal EEG, does not rule out the presence of epilepsy.  Clinical   correlation is necessary.         Assessment/Plan:   Mir Cisneros is a 54 y.o. with possible epilepsy who is being seen in follow-up regarding seizures.  She has a history of events without loss of consciousness that involve an abnormal sensation of closing and followed by ~30 minutes of a dizzy sensation.  No events since 2018, until she recently decreased her Lamictal dose.     She had a normal routine EEG in 2015, normal ambulatory  EEG in 9/2023.  MRI epilepsy protocol in 7/2020 was notable for a right frontal developmental venous anomaly which has been thought to be incidental rather than epileptogenic. 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     Plan to increase to lowest effective dose of Lamictal XR which was 200 mg daily in the morning.     For depressed mood, discussed starting a new medication and referral to therapy. In shared decision making, patient is amenable to psychology/therapy referral. Higher dose of Lamictal may also help with mood. She will consider additional medication but we will wait to see if these other interventions help. Also discussed bi-directional relationship between epilepsy and depression.      Follow-up in 3 months, or sooner if needed.     Alexa English M.D.   Diplomate, Neurology with Special Qualification in Epilepsy, American Board of Psychiatry and Neurology   of Clinical Neurology, [facility] of [facility]  Level III Epilepsy Center, Department of Neurology at [facility]       During today's encounter we discussed available treatment options and their individual side effect profiles. Total encounter time caring for patient today 35 minutes.

## 2024-11-13 ENCOUNTER — APPOINTMENT (OUTPATIENT)
Dept: RADIOLOGY | Facility: MEDICAL CENTER | Age: 54
End: 2024-11-13
Attending: FAMILY MEDICINE
Payer: COMMERCIAL

## 2024-11-13 DIAGNOSIS — Z12.31 VISIT FOR SCREENING MAMMOGRAM: ICD-10-CM

## 2024-11-20 ENCOUNTER — OFFICE VISIT (OUTPATIENT)
Dept: SLEEP MEDICINE | Facility: MEDICAL CENTER | Age: 54
End: 2024-11-20
Payer: COMMERCIAL

## 2024-11-20 VITALS
SYSTOLIC BLOOD PRESSURE: 122 MMHG | HEIGHT: 67 IN | DIASTOLIC BLOOD PRESSURE: 76 MMHG | HEART RATE: 75 BPM | OXYGEN SATURATION: 98 % | WEIGHT: 204 LBS | BODY MASS INDEX: 32.02 KG/M2

## 2024-11-20 DIAGNOSIS — J30.89 ENVIRONMENTAL AND SEASONAL ALLERGIES: ICD-10-CM

## 2024-11-20 DIAGNOSIS — J45.40 MODERATE PERSISTENT ASTHMA NOT DEPENDENT ON SYSTEMIC STEROIDS: ICD-10-CM

## 2024-11-20 DIAGNOSIS — G47.33 OSA ON CPAP: ICD-10-CM

## 2024-11-20 DIAGNOSIS — J32.0 CHRONIC MAXILLARY SINUSITIS: ICD-10-CM

## 2024-11-20 DIAGNOSIS — J45.20 MILD INTERMITTENT ASTHMA WITHOUT COMPLICATION: ICD-10-CM

## 2024-11-20 DIAGNOSIS — G47.33 OSA (OBSTRUCTIVE SLEEP APNEA): ICD-10-CM

## 2024-11-20 PROCEDURE — 99214 OFFICE O/P EST MOD 30 MIN: CPT

## 2024-11-20 PROCEDURE — 3078F DIAST BP <80 MM HG: CPT

## 2024-11-20 PROCEDURE — 99212 OFFICE O/P EST SF 10 MIN: CPT

## 2024-11-20 PROCEDURE — 3074F SYST BP LT 130 MM HG: CPT

## 2024-11-20 RX ORDER — LEVOTHYROXINE SODIUM 75 UG/1
TABLET ORAL
COMMUNITY
Start: 2024-11-07

## 2024-11-20 RX ORDER — BUDESONIDE AND FORMOTEROL FUMARATE DIHYDRATE 160; 4.5 UG/1; UG/1
2 AEROSOL RESPIRATORY (INHALATION) 2 TIMES DAILY
Qty: 10.2 G | Refills: 11 | Status: SHIPPED | OUTPATIENT
Start: 2024-11-20

## 2024-11-20 ASSESSMENT — ENCOUNTER SYMPTOMS
SINUS PAIN: 0
HEMOPTYSIS: 0
MYALGIAS: 0
CHILLS: 0
WEAKNESS: 0
DIAPHORESIS: 0
VOMITING: 0
SPUTUM PRODUCTION: 0
PALPITATIONS: 0
DIARRHEA: 0
COUGH: 1
HEADACHES: 0
FEVER: 0
FALLS: 0
DIZZINESS: 0
NAUSEA: 0
WHEEZING: 0
SHORTNESS OF BREATH: 0
HEARTBURN: 0

## 2024-11-20 ASSESSMENT — FIBROSIS 4 INDEX: FIB4 SCORE: 0.64

## 2024-11-20 NOTE — ASSESSMENT & PLAN NOTE
She also would like to get reestablished with the sleep center and get a new CPAP machine, as her previous one was a Demetrio Respironics and was recalled.  -- New CPAP order placed to [facility]  -- Referral to sleep center the patient to become reestablished.

## 2024-11-20 NOTE — ASSESSMENT & PLAN NOTE
PFTs on 3/1/2021 shows a normal pulmonary function test with a FVC of 3.08 L or 83%, FEV1 2.53 L or 86%, FEV1/FVC 82%, %, TLC 94%, and DLCO 120% predicted.  She is currently on albuterol as needed.  Triggers include seasonal allergies.  -- Restart Symbicort 160, as I feel the patient is having some reactive airways component post-COVID.  -- Continue albuterol as needed  -- Continue Zyrtec  -- Continue sinus rinses twice daily followed by Flonase

## 2024-11-20 NOTE — ASSESSMENT & PLAN NOTE
Resolved since ballooning.  --Continue to follow-up with ENT  --Continue saline sinus rinse, Flonase, Zyrtec, and Mucinex as directed by ENT

## 2024-11-20 NOTE — PATIENT INSTRUCTIONS
Please make sure that you are seen within 90 days of receiving your machine, with at least 30 days of use.  Please call scheduling at [phone] if your appointment needs to be moved to meet these parameters.     To meet compliance requirements for insurance please ensure that you use the machine at least 6/7 days a week for at least 4 or more hours a night.    I advise patients to research different mask options before picking up the new machine.  You should also ask what the [employer] company's return policy is for the mask because if you do not like the mask and don't return it within that time frame, you will have to wait 6 months for insurance to cover another mask.     Please bring your entire machine and chip to your first appointment, regardless if the machine is set up for wireless access.    Please do not return the machine without discussing any issues with a sleep provider, as you would be forfeiting therapy and would have to restart the testing process over.

## 2024-11-20 NOTE — PROGRESS NOTES
Pulmonary Clinic Note    Date of Visit: 11/20/2024     Chief Complaint:  Chief Complaint   Patient presents with    Follow-Up     Last seen 5/16/22 SIVA Tellez     HPI:   Mir Cisneros is a very pleasant 54 y.o. year old female former smoker (1.25 pack-years, quit in 1995), with a PMHx of asthma, chronic sinusitis, focal epilepsy, and GAIL (PSG 2016 AHI 15.5), on CPAP last seen in sleep clinic 2017  who presented to the Pulmonary Clinic for a regular follow up. Last seen in the office on 5/16/2022 with myself.     Patient is followed by the pulmonary office for asthma.  PFTs on 3/1/2021 shows a normal pulmonary function test with a FVC of 3.08 L or 83%, FEV1 2.53 L or 86%, FEV1/FVC 82%, %, TLC 94%, and DLCO 120% predicted.  CXR in 2020 was unremarkable.  Patient is currently on albuterol as needed.  Allergy panel did show a positive reactions to John and mold.   Patient is also on Zyrtec and saline nasal rinses followed by Flonase for chronic sinusitis and seasonal allergies.    Interval events:  11/20/2024-patient states that she had COVID back in August and does not feel like she is back to her baseline.  She did present to the emergency department in October 2024 at [facility] for wheezing and chest pain.  They apparently did a CTA which was negative.  They did prescribe her with a steroid pack.  She states that she has a persistent persistent dry cough that is not currently on any maintenance inhalers.  At most she will use her albuterol inhalers twice a day and it is usually correlated with increased allergies.  She denies any significant shortness of breath, sputum production, or wheezing.  She also had a ballooning of her sinuses with ENT in January 2024 and has not had any sinus issues since then.  She continues to use saline sinus rinses occasionally and Flonase.  She is also on Zyrtec and Mucinex, which have also been effective for her.  She also would like to get reestablished with  "the sleep center and get a new CPAP machine, as her previous one was a Demetrio Respironics and was recalled.    Exacerbations this year:  1- October 2024    Current medication regimen: Albuterol as needed.    Oxygen use: None    MMRC Grade: 0- Breathless only during strenuous exercise      Past Medical History:   Diagnosis Date    Allergic rhinitis     Anesthesia     \"severe nausea\"    Angioma, venous     Asthma in adult     mild    Back pain     Breath shortness     Chickenpox     Cough     Daytime sleepiness     Epilepsy (HCC)     Heart burn     Hypothyroidism     Kidney stone     Nasal drainage     Pain 05-09-12    neck to left wrist, 4/10    Pain 5/27/2014    right shoulder    Painful joint     Rhinitis     Seizure (HCC)     \"simple, partial\", last seizure 01/2013    Shortness of breath     Sleep apnea     Sore muscles     Sore throat, chronic     Unspecified disorder of thyroid     hypothyroid    Wears glasses     Wheezing      Past Surgical History:   Procedure Laterality Date    SHOULDER DECOMPRESSION ARTHROSCOPIC  5/29/2014    Performed by Mendel Guzman M.D. at [facility]ERY [facility]    CLAVICLE DISTAL EXCISION  5/29/2014    Performed by Mendel Guzman M.D. at [facility]    SHOULDER ARTHROSCOPY W/ ROTATOR CUFF REPAIR  5/29/2014    Performed by Mendel Guzman M.D. at [facility]    NERVE ULNAR TRANSFER  5/11/2012    Performed by ROXY HAMILTON at [facility]    EPICONDYLAR STRIPPING  5/11/2012    Performed by ROXY HAMILTON at [facility]    DILATION AND CURETTAGE  12/2005    D & C    PRIMARY C SECTION  2000    WRIST ARTHROSCOPY  1997    left wrist reconstruction    SHOULDER ARTHROSCOPY  1993    left acromioplasty     Social History     Socioeconomic History    Marital status:      Spouse name: Not on file    Number of children: Not on file    Years of education: Not on file    Highest education level: Not on file   Occupational " History    Not on file   Tobacco Use    Smoking status: Former     Current packs/day: 0.00     Average packs/day: 0.3 packs/day for 5.0 years (1.3 ttl pk-yrs)     Types: Cigarettes     Start date:      Quit date:      Years since quittin.9    Smokeless tobacco: Never   Vaping Use    Vaping status: Never Used   Substance and Sexual Activity    Alcohol use: Yes     Comment: 3-4 TIMES A WEEK     Drug use: No    Sexual activity: Not on file   Other Topics Concern    Not on file   Social History Narrative    Not on file     Social Drivers of Health     Financial Resource Strain: Not on file   Food Insecurity: Not on file   Transportation Needs: Not on file   Physical Activity: Not on file   Stress: Not on file   Social Connections: Not on file   Intimate Partner Violence: Not on file   Housing Stability: Not on file        Family History   Problem Relation Age of Onset    Asthma Mother     Other Mother         A Fib    Other Father         Parkinsons    Hypertension Other      Current Outpatient Medications on File Prior to Visit   Medication Sig Dispense Refill    levothyroxine (SYNTHROID) 75 MCG Tab       LamoTRIgine 100 MG TABLET SR 24 HR Take 2 Tablets by mouth every day. 180 Tablet 2    albuterol (PROVENTIL) 2.5mg/3ml Nebu Soln solution for nebulization       Probiotic Product (PROBIOTIC PO) Take  by mouth every day.      Flaxseed, Linseed, (FLAXSEED OIL PO) Take  by mouth every bedtime. Unsure of dose       Coenzyme Q10 (CO Q 10 PO) Take  by mouth every bedtime. Unsure of dose       Multiple Vitamin (MULTIVITAMIN PO) Take  by mouth every bedtime.      fluticasone (FLONASE) 50 MCG/ACT nasal spray Administer 2 Sprays into affected nostril(S) every day. 1 g 1    SYNTHROID 88 MCG Tab Take 88 mcg by mouth Every morning on an empty stomach.       No current facility-administered medications on file prior to visit.     Allergies: Patient has no known allergies.    ROS:   Review of Systems   Constitutional:   "Negative for chills, diaphoresis, fever and malaise/fatigue.   HENT:  Negative for congestion and sinus pain.    Respiratory:  Positive for cough. Negative for hemoptysis, sputum production, shortness of breath and wheezing.    Cardiovascular:  Negative for chest pain, palpitations and leg swelling.   Gastrointestinal:  Negative for diarrhea, heartburn, nausea and vomiting.   Musculoskeletal:  Negative for falls and myalgias.   Neurological:  Negative for dizziness, weakness and headaches.     Vitals:  /76 (BP Location: Right arm, Patient Position: Sitting, BP Cuff Size: Adult)   Pulse 75   Ht 1.702 m (5' 7\")   Wt 92.5 kg (204 lb)   SpO2 98%     Physical Exam  Constitutional:       General: She is not in acute distress.     Appearance: Normal appearance. She is not ill-appearing, toxic-appearing or diaphoretic.   Cardiovascular:      Rate and Rhythm: Normal rate and regular rhythm.      Pulses: Normal pulses.      Heart sounds: Normal heart sounds. No murmur heard.     No friction rub. No gallop.   Pulmonary:      Effort: Pulmonary effort is normal. No respiratory distress.      Breath sounds: Normal breath sounds. No stridor. No wheezing, rhonchi or rales.   Musculoskeletal:         General: No swelling.      Right lower leg: No edema.      Left lower leg: No edema.   Skin:     General: Skin is warm.   Neurological:      General: No focal deficit present.      Mental Status: She is alert and oriented to person, place, and time.   Psychiatric:         Mood and Affect: Mood normal.         Behavior: Behavior normal.         Thought Content: Thought content normal.         Judgment: Judgment normal.         Laboratory Data:  PFTs (Date: 3/1/2021)-    Impression:  1.  Baseline spirometry shows normal airflows.  2.  No significant bronchodilator response.  3.  Lung volumes are within normal limits other than ERV which may be due to patient body habitus.  4.  Diffusion capacity is at the upper limits of normal " at 120% predicted.  Normal pulmonary function testing with normal flow volume loop.  This does not rule out reactive airways disease.  Suggest clinical correlation.     CXR: (Date: 6/8/2020)-  Impression:  No active disease.       Assessment and Plan:    Problem List Items Addressed This Visit          Pulmonary/Sleep Medicine Problems    GAIL (obstructive sleep apnea)     She also would like to get reestablished with the sleep center and get a new CPAP machine, as her previous one was a Demetrio Respironics and was recalled.  -- New CPAP order placed to [facility]  -- Referral to sleep center the patient to become reestablished.         Relevant Orders    Referral to Pulmonary and Sleep Medicine       Other    Mild intermittent asthma without complication     PFTs on 3/1/2021 shows a normal pulmonary function test with a FVC of 3.08 L or 83%, FEV1 2.53 L or 86%, FEV1/FVC 82%, %, TLC 94%, and DLCO 120% predicted.  She is currently on albuterol as needed.  Triggers include seasonal allergies.  -- Restart Symbicort 160, as I feel the patient is having some reactive airways component post-COVID.  -- Continue albuterol as needed  -- Continue Zyrtec  -- Continue sinus rinses twice daily followed by Flonase         Relevant Medications    budesonide-formoterol (SYMBICORT) 160-4.5 MCG/ACT Aerosol    Chronic maxillary sinusitis     Resolved since ballooning.  --Continue to follow-up with ENT  --Continue saline sinus rinse, Flonase, Zyrtec, and Mucinex as directed by ENT         Environmental and seasonal allergies     As above.          Other Visit Diagnoses       GAIL on CPAP        Relevant Orders    DME CPAP            Diagnostic studies have been reviewed with the patient.    Return in about 6 months (around 5/20/2025), or if symptoms worsen or fail to improve, for ASTHMA, with Zander.     This note was generated using voice recognition software which has a chance of producing errors of grammar and possibly content.  I  have made every reasonable attempt to find and correct any obvious errors, but it should be expected that some may not be found prior to finalization of this note.    Time spent in record review prior to patient arrival, reviewing results, and in face-to-face encounter totaled 30 min.  __________  SIVA Haynes  Pulmonary Medicine  [facility]

## 2024-11-26 ENCOUNTER — HOSPITAL ENCOUNTER (OUTPATIENT)
Dept: RADIOLOGY | Facility: MEDICAL CENTER | Age: 54
End: 2024-11-26
Attending: FAMILY MEDICINE
Payer: COMMERCIAL

## 2024-11-26 DIAGNOSIS — Z12.31 VISIT FOR SCREENING MAMMOGRAM: ICD-10-CM

## 2024-11-26 PROCEDURE — 77067 SCR MAMMO BI INCL CAD: CPT

## 2024-12-30 ENCOUNTER — OFFICE VISIT (OUTPATIENT)
Dept: SLEEP MEDICINE | Facility: MEDICAL CENTER | Age: 54
End: 2024-12-30
Payer: COMMERCIAL

## 2024-12-30 VITALS
HEART RATE: 69 BPM | OXYGEN SATURATION: 97 % | BODY MASS INDEX: 32.18 KG/M2 | RESPIRATION RATE: 16 BRPM | SYSTOLIC BLOOD PRESSURE: 130 MMHG | HEIGHT: 67 IN | DIASTOLIC BLOOD PRESSURE: 80 MMHG | WEIGHT: 205 LBS

## 2024-12-30 DIAGNOSIS — G47.33 OSA (OBSTRUCTIVE SLEEP APNEA): ICD-10-CM

## 2024-12-30 PROCEDURE — 99213 OFFICE O/P EST LOW 20 MIN: CPT | Performed by: STUDENT IN AN ORGANIZED HEALTH CARE EDUCATION/TRAINING PROGRAM

## 2024-12-30 ASSESSMENT — FIBROSIS 4 INDEX: FIB4 SCORE: 0.64

## 2024-12-30 NOTE — PATIENT INSTRUCTIONS
CBT-I Online Resources    Path to Better Sleep (free) - https://www.veterantraining.va.gov/insomnia/index.asp  This free online Cognitive Behavioral Therapy for Insomnia course, which was developed for  veterans, takes you through a sleep &quot;check-in&quot; and the various components of CBT-I,  including modifying unhelpful behaviors and unhelpful thoughts around sleep.  Insomnia  (free) - https://insomniacoach.com/  Offers a self-guided 5-week training plan designed to change sleep habits. Smart phone alfredo  available.  CBT for Insomnia - Leonardo Antonio, PhD, CBSM ($50-$70) -  https://www.cbtforinsomnia.com/  CBT for Insomnia is a five week PDF-based CBT-I program based on Dr. Leonardo Antonio&#39;  twenty years of CBT-I research and clinical practice at [employer].  Go! To Sleep - [facility] ($40) - Go! to Sleep ([employer])  A 6-week online course for improving sleep that teaches you to identify and then reframe  specific thoughts and behaviors that interfere with your ability to sleep deeply.  Sleepio - Layo Dumas PhD, CBSM ($50-$70) - https://www.PayAllies/  Sleepio is a 6-module online CBT-I program developed with Layo Dumas, a renowned  insomnia clinician and researcher, that is available through some Employee Assistance  Programs.  Sleepfitness.com - Ros Barillas PhD, CBSM ($129) - https://Bourbon & Boots/  A 6-week self-guided insomnia program based on Cognitive Behavioral Treatment for  Insomnia (CBTi) that is in online format. You can sign up for the 7-day free trial and learn  how CBTi can improve your sleep.  Insomnia Solved - Mp River MD ($89) - http://www.mpVIRxSYS.Be At One/fix-  my-insomnia/  Insomnia Solved is a self-guided CBTI program created by Mp River M.D., a board-  certified medical doctor. The complete program includes full access to exclusive multimedia  content, including an 154-page eBook and online modules and audiofiles.  CBT-I  Books  Pedro Mind: Turn Off Your Noisy Thoughts and Get a Good Night&#39;s Sleep -  Melanie Cool, PhD and Carol Clark, PhD  Accessible, enjoyable, and grounded in evidence-based cognitive behavioral therapy (CBT),  Pedro Mind directly addresses the effects of rumination?or having an overactive  brain?on your ability to sleep well. Written by two psychologists who specialize in sleep  disorders, the book contains helpful exercises and insights into how you can better manage  your thoughts at bedtime, and finally get some sleep.  Quiet Your Mind and Get to Sleep: Solutions to Insomnia for Those with Depression,  Anxiety or Chronic Pain - Carol Clark, PhD  This workbook uses cognitive behavior therapy, which has been shown to work as well as  sleep medications and produce longer-lasting effects. Research shows that it also works  well for those whose insomnia is experienced in the context of anxiety, depression, and  chronic pain. The complete program in this book goes to the root of your insomnia and offers  the same techniques used by experienced sleep specialists.  Sleep Through Insomnia - Kojo River MD  Cognitive behavioral therapy for insomnia (CBTI) is often structured as a 6-week treatment  program that can help people who have difficulty falling asleep, staying asleep, or find that  sleep is unrefreshing. CBTI is scientifically proven, highly effective, and does not rely on    medications. CBTI has life-long benefits and most participants report improved sleep  satisfaction. Insomnia Solved is based on the core features of this treatment.  Here are some guidelines to help you establish healthy sleep habits:      Keep a consistent sleep schedule. Get up at the same time every day, even on weekends or  during vacations.      Set a bedtime that is early enough for you to get at least 7 hours of sleep. Establish relaxing  bedtime rituals (like a warm bath or nighttime reading routine) so that  your body knows it&#39;s time  to wind down.      Limit exposure to light in the evenings. The light from screens (including smartphones, tablets,  televisions, and computers) can convince your brain that it&#39;s daytime and make you feel less  tired than you actually are. Lamplight is fine. Starting 2 hours before bedtime, turn off the  screens and choose quiet, relaxing activities that you enjoy.      Use your bed only for sleep and sex. Watching TV, using your laptop, etc in bed will make you  start to associate bed with the violent movie on TV, the stressful emails on your computer, etc.  Keep your bedroom quiet, dark, and cool, and let it be a haven from the rest of your busy life.      If you don’t fall asleep after what feels like about 20 minutes (don&#39;t keep your eye on the  clock!), get out of bed. The same is true for falling asleep after waking up in the middle of the  night. Leave the bedroom, find a book to read (or other quiet activity -- no screens), get a  simple, light snack or make a cup of hot milk or herbal tea, and relax in a your favorite chair. If  you feel tired enough to go back to sleep, return to bed. If not, don&#39;t worry. You&#39;ll be sleepier at  bedtime the next night and more likely to sleep well.      Exercise regularly and maintain a healthy diet. But, avoid vigorous exercise within 2 hours of  bedtime, and don’t eat a large meal before bedtime. If you are hungry at night, eat a light,  healthy snack.      Avoid consuming caffeine after 2 pm. If you are very sensitive to caffeine, don&#39;t use it after  noon.      Avoid consuming alcohol before bedtime. Alcohol may make you feel sleepy initially but will  actually reduce the quality of your sleep and make it likelier that you will wake up in the middle  of the night.                 Allow 1-2 hours of “wind down” before bedtime with relaxing, non-stimulating activities              Try limiting screen use and/or using blue  light blockers (apps for filters) starting 2 hours  before bedtime to prevent suppression of melatonin.              Get up and out of bed within 15 minutes of your desired time in the morning to set your  internal clock.              Make sure to get early exposure to bright light.  This will help you feel more awake and  set your internal clock to make it easier to wake up in the morning.

## 2024-12-30 NOTE — PROGRESS NOTES
[facility] Follow-up Visit    CC: re-establish care for GAIL      HPI:  Mir Cisneros is a 54 y.o. postmenopausal female former smoker, BMI 32, asthma, hashimoto's, chronic sinusitis s/p balloon dilating (Jan 2023), focal epilepsy, GAIL (PSG 2016 AHI 15.5), on CPAP who presents to Sleep Clinic to reestablish care.     She also would like to get reestablished with the sleep center and get a new CPAP machine, as her previous one was a [employer] and was recalled.     Goes to bed 11 PM on weekdays and 12:30 AM on weekends.  She gets out of bed at 7:30 AM on weekdays and 8:30 AM on the weekends.  She averages about 7 to 8 hours of sleep. 1-2 x nocturia  Sleep aids: sometimes with take ambien (1/3 pill) given she has been going through a lot of stressors -she has friends and family in genocide    Last used her machine 3 years ago. She previously noted a difference in CPAP. Her  is complaining that she is snoring, gasping. She has dreams where she is holding her breath. No daytime naps.  Denies parasomnias, RLS, narcolepsy symptoms.  Her father had Parkinson's so she is very aware to look for parasomnia symptoms.  She went through menopause 2 years ago.  Work: run a non-profit, [employer], works at the [employer]    ESS - 7    Sleep History  11/23/2016 -AHI 15.5/h.  Supine AHI 34.4, REM AHI 62.3.  Jamarcus SpO2 82%, 2.6 of total recording time less than 90%.    CPAP titration 12/11/2016 -CPAP titrated 5-7 cmH2O.  Patient did best at CPAP 7 where she achieved both supine and REM sleep.  Resultant AHI was 0.6, min SpO2 93%.      Patient Active Problem List    Diagnosis Date Noted    Environmental and seasonal allergies 11/20/2024    Chronic maxillary sinusitis 12/02/2020    Cavernous angioma 07/15/2020    Insomnia 12/16/2016    BMI 31.0-31.9,adult 12/16/2016    GAIL (obstructive sleep apnea) 10/31/2016    Right shoulder pain 05/29/2014    Cervicalgia 01/14/2014    Mild intermittent asthma without  "complication     Seizure ([facility])     Heart burn     Disorder of thyroid        Past Medical History:   Diagnosis Date    Allergic rhinitis     Anesthesia     \"severe nausea\"    Angioma, venous     Asthma in adult     mild    Back pain     Breath shortness     Chickenpox     Cough     Daytime sleepiness     Epilepsy ([facility])     Heart burn     Hypothyroidism     Kidney stone     Nasal drainage     Pain 05-09-12    neck to left wrist, 4/10    Pain 5/27/2014    right shoulder    Painful joint     Rhinitis     Seizure ([facility])     \"simple, partial\", last seizure 01/2013    Shortness of breath     Sleep apnea     Sore muscles     Sore throat, chronic     Unspecified disorder of thyroid     hypothyroid    Wears glasses     Wheezing         Past Surgical History:   Procedure Laterality Date    SHOULDER DECOMPRESSION ARTHROSCOPIC  5/29/2014    Performed by Mendel Guzman M.D. at SURGERY [facility]    CLAVICLE DISTAL EXCISION  5/29/2014    Performed by Mendel Guzman M.D. at SURGERY [facility]    SHOULDER ARTHROSCOPY W/ ROTATOR CUFF REPAIR  5/29/2014    Performed by Mendel Guzman M.D. at SURGERY [facility]    NERVE ULNAR TRANSFER  5/11/2012    Performed by ROXY HAMILTON at SURGERY SAME DAY [facility]    EPICONDYLAR STRIPPING  5/11/2012    Performed by ROXY HAMILTON at SURGERY SAME DAY [facility]    DILATION AND CURETTAGE  12/2005    D & C    PRIMARY C SECTION  2000    WRIST ARTHROSCOPY  1997    left wrist reconstruction    SHOULDER ARTHROSCOPY  1993    left acromioplasty       Family History   Problem Relation Age of Onset    Asthma Mother     Other Mother         A Fib    Other Father         Parkinsons    Hypertension Other        Social History     Socioeconomic History    Marital status:      Spouse name: Not on file    Number of children: Not on file    Years of education: Not on file    Highest education level: Not on file   Occupational History    Not on file   Tobacco Use    Smoking " status: Former     Current packs/day: 0.00     Average packs/day: 0.3 packs/day for 5.0 years (1.3 ttl pk-yrs)     Types: Cigarettes     Start date:      Quit date:      Years since quittin.0    Smokeless tobacco: Never   Vaping Use    Vaping status: Never Used   Substance and Sexual Activity    Alcohol use: Yes     Comment: 3-4 TIMES A WEEK     Drug use: No    Sexual activity: Not on file   Other Topics Concern    Not on file   Social History Narrative    Not on file     Social Drivers of Health     Financial Resource Strain: Not on file   Food Insecurity: Not on file   Transportation Needs: Not on file   Physical Activity: Not on file   Stress: Not on file   Social Connections: Not on file   Intimate Partner Violence: Not on file   Housing Stability: Not on file       Current Outpatient Medications   Medication Sig Dispense Refill    levothyroxine (SYNTHROID) 75 MCG Tab       budesonide-formoterol (SYMBICORT) 160-4.5 MCG/ACT Aerosol Inhale 2 Puffs 2 times a day. Use spacer. Rinse mouth after each use. 10.2 g 11    LamoTRIgine 100 MG TABLET SR 24 HR Take 2 Tablets by mouth every day. 180 Tablet 2    albuterol (PROVENTIL) 2.5mg/3ml Nebu Soln solution for nebulization       fluticasone (FLONASE) 50 MCG/ACT nasal spray Administer 2 Sprays into affected nostril(S) every day. 1 g 1    Probiotic Product (PROBIOTIC PO) Take  by mouth every day.      Flaxseed, Linseed, (FLAXSEED OIL PO) Take  by mouth every bedtime. Unsure of dose       Coenzyme Q10 (CO Q 10 PO) Take  by mouth every bedtime. Unsure of dose       Multiple Vitamin (MULTIVITAMIN PO) Take  by mouth every bedtime.       No current facility-administered medications for this visit.        ALLERGIES: Patient has no known allergies.    ROS  Constitutional: Denies fevers, Denies weight changes  Ears/Nose/Throat/Mouth: Denies nasal congestion or sore throat   Cardiovascular: Denies chest pain  Respiratory: Denies shortness of breath, Denies  "cough  Gastrointestinal/Hepatic: Denies nausea, vomiting  Sleep: see HPI      PHYSICAL EXAM  /80 (BP Location: Left arm, Patient Position: Sitting, BP Cuff Size: Large adult)   Pulse 69   Resp 16   Ht 1.702 m (5' 7\")   Wt 93 kg (205 lb)   SpO2 97%   BMI 32.11 kg/m²   Appearance: Well-nourished, well-developed, no acute distress  Eyes:  No scleral icterus , EOMI  Lung auscultation:  No wheezes rhonchi rubs or rales  Cardiac: No murmurs, rubs, or gallops; regular rhythm, normal rate; no edema  Musculoskeletal:  Grossly normal; gait and station normal; digits and nails normal  Skin:  No rashes, petechiae, cyanosis  Neurologic: without focal signs; oriented to person, time, place, and purpose; judgement intact      PFTs on 3/1/2021: FVC of 3.08 L or 83%, FEV1 2.53 L or 86%, FEV1/FVC 82%, %, TLC 94%, and DLCO 120%          Medical Decision Making   Assessment and Plan  Previous history of obstructive sleep apnea - Diagnostic and titration nocturnal polysomnogram's, home sleep apnea tests reviewed.  She has been off therapy for 3 years now ever since COVID pandemic and not being able to obtain a new device after the [employer] video recall.  She has worsening symptoms of snoring, gasping, fragmented sleep with nocturia.  She is enthusiastic to get back on therapy for her GAIL.    PLAN:   -HST to reevaluate severity of GAIL  -Pending results we will likely order CPAP based on our discussion regarding treatment options  -CBT-I resources also given for subacute insomnia  -Advised to reach out via MyChart with questions     Patients with GAIL are at increased risk of cardiovascular disease including coronary artery disease, systemic arterial hypertension, pulmonary arterial hypertension, cardiac arrythmias, and stroke. The patient was advised to avoid driving a motor vehicle when drowsy.    Positive airway pressure will favorably impact many of the adverse conditions and effects provoked by GAIL.    Have " advised the patient to follow up with the appropriate healthcare practitioners for all other medical problems and issues.    Return in about 1 month (around 1/30/2025) for sleep study results, please do video visit.      Please note portions of this record was created using voice recognition software. I have made every reasonable attempt to correct obvious errors, but I expect that there are errors of grammar and possibly content I did not discover before finalizing the note.

## 2025-01-23 ENCOUNTER — PATIENT MESSAGE (OUTPATIENT)
Dept: SLEEP MEDICINE | Facility: MEDICAL CENTER | Age: 55
End: 2025-01-23
Payer: COMMERCIAL

## 2025-01-27 ENCOUNTER — HOME STUDY (OUTPATIENT)
Dept: SLEEP MEDICINE | Facility: MEDICAL CENTER | Age: 55
End: 2025-01-27
Attending: STUDENT IN AN ORGANIZED HEALTH CARE EDUCATION/TRAINING PROGRAM
Payer: COMMERCIAL

## 2025-01-27 DIAGNOSIS — G47.33 OSA (OBSTRUCTIVE SLEEP APNEA): ICD-10-CM

## 2025-01-27 PROCEDURE — 95800 SLP STDY UNATTENDED: CPT | Performed by: STUDENT IN AN ORGANIZED HEALTH CARE EDUCATION/TRAINING PROGRAM

## 2025-02-03 NOTE — PROCEDURES
DIAGNOSTIC HOME SLEEP TEST (HST) REPORT WatchPAT      PATIENT ID:  NAME:  Mir Cisneros  MRN:               [id number]  YOB: 1970  DATE OF STUDY: 1/27/2025      Impression:     This study shows evidence of:      1. Moderate obstructive sleep apnea with PAT apnea hypopnea index(3% pAHI) of 25 per hour.  PAT respiratory disturbance index (pRDI) was 25.7 per hour. These findings are based on 7 channels recording of PAT signal with sleep staging, heart rate, pulse oximetry, actigraphy, body position, snoring and respiratory movement.     2. Oxygenation O2 Sat. mean O2 sat was 93%,  yue was 83%,  and maximum O2 at 98 %. O2 sat was at or  below 88% for 7.8 min of evaluation time. Oxygen Desaturation (>=4%) Index was 13.6/hr. AVG HR was 79 BPM.      TECHNICAL DESCRIPTION: Patient underwent home sleep apnea testing with peripheral arterial tone signal (WatchPAT™). This is a Type IV portable monitor and device per Medicare. Monitoring was done with 7 channels recording of PAT signal with sleep staging, heart rate, pulse oximetry, actigraphy, body position, snoring and respiratory movement. Prior to using the device, the patient received verbal and written instructions for its application and was provided with the help desk phone number for additional telephonic instruction with 24-hour availability of qualified personnel to answer questions.    Respiratory events:            General sleep summary: Total recording time is 6 hours and 56 minutes and total Sleep time is 6 hours and 2 minutes. The patient spent 117 minutes in the supine position and 245.1 minutes in the nonsupine position.      Recommendations:    1. CPAP titration study vs Auto CPAP trial. If trial empiric autoCPAP 5-15 cmH20 with EPR 3 with heated tube and proper mask fit    2. I also recommend 30 day compliance download to assess the efficacy to the recommended pressure, measure leak, apnea hypopnea index and compliance for  further outpatient monitoring and management of PAP therapy. In some cases alternative treatment options may be proven effective in resolving sleep apnea. These options include upper airway surgery, the use of a dental orthotic, weight loss, or positional therapy. Clinical correlation is required. In general patients with sleep apnea are advised to avoid alcohol, sedatives and not to operate a motor vehicle while drowsy. Untreated sleep apnea increases the risk for cardiovascular and neurovascular disease.            Riri Chávez MD

## 2025-02-18 ENCOUNTER — TELEMEDICINE (OUTPATIENT)
Dept: SLEEP MEDICINE | Facility: MEDICAL CENTER | Age: 55
End: 2025-02-18
Attending: STUDENT IN AN ORGANIZED HEALTH CARE EDUCATION/TRAINING PROGRAM
Payer: COMMERCIAL

## 2025-02-18 ENCOUNTER — APPOINTMENT (OUTPATIENT)
Dept: NEUROLOGY | Facility: MEDICAL CENTER | Age: 55
End: 2025-02-18
Attending: STUDENT IN AN ORGANIZED HEALTH CARE EDUCATION/TRAINING PROGRAM
Payer: COMMERCIAL

## 2025-02-18 VITALS — BODY MASS INDEX: 31.39 KG/M2 | WEIGHT: 200 LBS | HEIGHT: 67 IN

## 2025-02-18 DIAGNOSIS — G47.33 OSA (OBSTRUCTIVE SLEEP APNEA): ICD-10-CM

## 2025-02-18 ASSESSMENT — FIBROSIS 4 INDEX: FIB4 SCORE: 0.64

## 2025-02-18 NOTE — PROGRESS NOTES
Sleep Clinic Telemedicine Follow Up Note    This visit was conducted via Zoom using secure and encrypted videoconferencing technology. The patient was in a private location in the state [facility]. The patient's identity was confirmed and verbal consent was obtained for this virtual visit. Given the importance of social distancing and other strategies recommended to reduce the risk of COVID-19 transmission, I am providing medical care to this patient via audio/video visit in place of an in person visit at the request of the patient. Verbal consent to telehealth, risks, benefits, and consequences were discussed. Patient retains the right to withdraw at any time. All existing confidentiality protections apply. The patient has access to all transmitted medical information. No dissemination of any patient images or information to other entities without further written consent.     Place of Service: Home    The patient was seen by Riri Chávez M.D.    Reason for follow up: Sleep study follow-up    Patient confirmed that they are currently in the state [facility] during this visit.    Interval history:   Mir Cisneros is a 54 y.o.postmenopausal female former smoker, BMI 32, asthma, hashimoto's, chronic sinusitis s/p balloon dilating (Jan 2023), focal epilepsy, GAIL (PSG 2016 AHI 15.5), on CPAP who presents for sleep study results.    Last seen by me 12/30/2024 -to establish care for GAIL management.  She last used her machine 3 years ago.  She previously did not notice a difference in CPAP.  Now her  is complaining that she is snoring and gasping for air having witnessed apnea events.  Plan was to follow-up with HST to reevaluate severity of GAIL    Sleep History  HST 1/27/2025 -3% AHI 25.  Jamarcus SpO2 83%, 7.8 minutes of evaluation time less than 88%. CPAP titration study vs Auto CPAP trial. If trial empiric autoCPAP 5-15 cmH20 with EPR 3 with heated tube and proper mask fit       Current  "Outpatient Medications   Medication Sig Dispense Refill    levothyroxine (SYNTHROID) 75 MCG Tab       budesonide-formoterol (SYMBICORT) 160-4.5 MCG/ACT Aerosol Inhale 2 Puffs 2 times a day. Use spacer. Rinse mouth after each use. 10.2 g 11    LamoTRIgine 100 MG TABLET SR 24 HR Take 2 Tablets by mouth every day. 180 Tablet 2    albuterol (PROVENTIL) 2.5mg/3ml Nebu Soln solution for nebulization       fluticasone (FLONASE) 50 MCG/ACT nasal spray Administer 2 Sprays into affected nostril(S) every day. 1 g 1    Probiotic Product (PROBIOTIC PO) Take  by mouth every day.      Flaxseed, Linseed, (FLAXSEED OIL PO) Take  by mouth every bedtime. Unsure of dose       Coenzyme Q10 (CO Q 10 PO) Take  by mouth every bedtime. Unsure of dose       Multiple Vitamin (MULTIVITAMIN PO) Take  by mouth every bedtime.       No current facility-administered medications for this visit.       Past Medical History:   Diagnosis Date    Allergic rhinitis     Anesthesia     \"severe nausea\"    Angioma, venous     Asthma in adult     mild    Back pain     Breath shortness     Chickenpox     Cough     Daytime sleepiness     Epilepsy ([facility])     Heart burn     Hypothyroidism     Kidney stone     Nasal drainage     Pain 05-09-12    neck to left wrist, 4/10    Pain 5/27/2014    right shoulder    Painful joint     Rhinitis     Seizure ([facility])     \"simple, partial\", last seizure 01/2013    Shortness of breath     Sleep apnea     Sore muscles     Sore throat, chronic     Unspecified disorder of thyroid     hypothyroid    Wears glasses     Wheezing        Review of Systems  Negative unless otherwise stated in HPI    Physical Exam:  Ht 1.702 m (5' 7\")   Wt 90.7 kg (200 lb)   BMI 31.32 kg/m²  Body mass index is 31.32 kg/m².  General appearance: well appearing, no acute distress  HENT: deferred  Neurologic: A&amp;Ox4  Psychiatric: normal mood. Speech non tangential. Pleasant affect      Assessment:  GAIL not yet on therapy    Plan:  Amenable to initiation of " empiric auto CPAP 5-15 cmH2O with heated tubing and proper mask fit  Follow-up in 3 months for PAP compliance    Discussed pathophysiology of GAIL and increased cardiovascular risk for untreated moderate to severe GAIL    Riri Chávez MD    RTC in 3 months

## 2025-03-12 ENCOUNTER — HOSPITAL ENCOUNTER (OUTPATIENT)
Dept: LAB | Facility: MEDICAL CENTER | Age: 55
End: 2025-03-12
Attending: STUDENT IN AN ORGANIZED HEALTH CARE EDUCATION/TRAINING PROGRAM
Payer: COMMERCIAL

## 2025-03-12 DIAGNOSIS — R56.9 SEIZURE (HCC): ICD-10-CM

## 2025-03-12 LAB — 25(OH)D3 SERPL-MCNC: 61 NG/ML (ref 30–100)

## 2025-03-12 PROCEDURE — 36415 COLL VENOUS BLD VENIPUNCTURE: CPT

## 2025-03-12 PROCEDURE — 82306 VITAMIN D 25 HYDROXY: CPT

## 2025-03-12 PROCEDURE — 80175 DRUG SCREEN QUAN LAMOTRIGINE: CPT

## 2025-03-14 ENCOUNTER — OFFICE VISIT (OUTPATIENT)
Dept: NEUROLOGY | Facility: MEDICAL CENTER | Age: 55
End: 2025-03-14
Attending: STUDENT IN AN ORGANIZED HEALTH CARE EDUCATION/TRAINING PROGRAM
Payer: COMMERCIAL

## 2025-03-14 VITALS
RESPIRATION RATE: 16 BRPM | OXYGEN SATURATION: 98 % | BODY MASS INDEX: 33.29 KG/M2 | TEMPERATURE: 97.3 F | DIASTOLIC BLOOD PRESSURE: 74 MMHG | HEIGHT: 67 IN | SYSTOLIC BLOOD PRESSURE: 116 MMHG | HEART RATE: 97 BPM | WEIGHT: 212.08 LBS

## 2025-03-14 DIAGNOSIS — Z13.31 DEPRESSION SCREENING: ICD-10-CM

## 2025-03-14 DIAGNOSIS — Q28.3 DEVELOPMENTAL VENOUS ANOMALY, CEREBRAL: ICD-10-CM

## 2025-03-14 DIAGNOSIS — G40.109 FOCAL EPILEPSY (HCC): Primary | ICD-10-CM

## 2025-03-14 LAB — LAMOTRIGINE SERPL-MCNC: 4.3 UG/ML (ref 3–15)

## 2025-03-14 PROCEDURE — 99212 OFFICE O/P EST SF 10 MIN: CPT | Performed by: STUDENT IN AN ORGANIZED HEALTH CARE EDUCATION/TRAINING PROGRAM

## 2025-03-14 PROCEDURE — 3074F SYST BP LT 130 MM HG: CPT | Performed by: STUDENT IN AN ORGANIZED HEALTH CARE EDUCATION/TRAINING PROGRAM

## 2025-03-14 PROCEDURE — 3078F DIAST BP <80 MM HG: CPT | Performed by: STUDENT IN AN ORGANIZED HEALTH CARE EDUCATION/TRAINING PROGRAM

## 2025-03-14 PROCEDURE — 99214 OFFICE O/P EST MOD 30 MIN: CPT | Performed by: STUDENT IN AN ORGANIZED HEALTH CARE EDUCATION/TRAINING PROGRAM

## 2025-03-14 ASSESSMENT — PATIENT HEALTH QUESTIONNAIRE - PHQ9
CLINICAL INTERPRETATION OF PHQ2 SCORE: 4
5. POOR APPETITE OR OVEREATING: 1 - SEVERAL DAYS
SUM OF ALL RESPONSES TO PHQ QUESTIONS 1-9: 10

## 2025-03-14 ASSESSMENT — FIBROSIS 4 INDEX: FIB4 SCORE: 0.64

## 2025-03-14 NOTE — PROGRESS NOTES
"[facility]  Follow up visit    Patient name: Mir Cisneros  YOB: 1970  MRN: [id number]  Date of visit: 3/14/2025        Background:    Mir Cisneros is a 54 y.o. right handed woman who is being seen in follow up for episodes clinically consistent with epileptic seizures. Last visit 2024.    Details of history per Dr. Mcleod:  Dominant hand: right handed      In brief, her pertinent medical history is remarkable for stable right frontal venous angioma/developmental venous anomaly with most recent MRI in .      Seizure type 1 - focal seizure aware      The onset of seizure was , she was sitting on the stool and sudden onset room spinning and fell off, had difficulty speaking.   She also developed bad headache at that time.   Then she started to have \"simple partial seizure\"  The ictal behavior was stereotyped and described as closed in for 30 seconds --> post-ictal feels dizzy sensation for 30 minutes, fatigue afterwards. No LOC.  Frequency: she was having fairly frequent on weekly basis.  The last seizure was in 2018        The longest seizure free period was 3 years   The seizures were isolated. There was               no cluster of seizures,               no history of status epilepticus     Special features:  They were noted only during the wake, there was no specific timing.      Potential triggering factors were reviewed              Sleep deprivation              Alcohol              Stress              Periods              Other     Epilepsy risk factors:      -Positive: None  -Negative: Normal prenatal and  course. Normal development physically and intellectually. No febrile convulsions. No history of severe head trauma. No meningitis. No stroke. No alcohol abuse. No significant exposure to recreational drugs. There is no family history of epilepsy, or spells.         Current AEDs:  LTG  mg qam      Previous workup:     1. EEG " "data: 9/2015 (Dr. Ordoñez) normal awake and sleep EEG     2. Neuroimaging data: MRI brain 2020 Right frontal venous angioma/developmental venous anomaly again seen. No evidence of interval hemorrhage or hemosiderin deposition. No change from prior exam.     3. Prior antiepileptic medications were reviewed  Lamotrigine (Lamictal)      Visit 3/25/24:  She has been doing well overall with no breakthrough seizures.  Her last seizure was in 2018.  She is hoping to be able to come off of the Lamictal if possible.  The diagnosis of epilepsy was made based on the presence of a postictal period though it was not fully clear whether the episodes she was having were seizures. She underwent a routine EEG which was concerning for frontal sharp vs. artifact thus subsequently underwent 24h ambulatory EEG which was normal.     Advised patient to decrease from Lamictal  mg nightly to 200 mg nightly for one month and to closely track symptoms. If she remains free of typical previous episodes we can decrease to 150 mg nightly for 1 month.     6/2024 visit:   She decreased Lamictal XR from 250 mg daily to 200 mg - sleep improved.     11/2024 visit:  After decreasing Lamictal to 150 mg she had a recurrence of auras.  Aura symptoms: sense of \"things closing in\", earth shifting for a second. Mildly nauseated for a few minutes afterward.           Interval history:  The patient is unaccompanied to the visit and provides a history independently.     She has had a recurrence of auras since decreasing from 200mg to 150 mg.      Sleep: She is taking ambien PRN for sleep - a quarter of an ambien is enough for her, prescribed by PCP. History of GAIL needing new CPAP machine which she now has. She used it for about 3 days before her  accidentally threw away one of the components, ordered it again and plans to use it.     Mood: better, referred to behavioral health but appointment was scheduled ~6 months out      3/14/2025     8:20 AM " "11/11/2024     8:20 AM 3/25/2024     7:20 AM 8/11/2023    10:00 AM 5/16/2022    11:00 AM   PHQ-9 Screening   Little interest or pleasure in doing things 2 - more than half the days 1 - several days 0 - not at all 0 - not at all 0 - not at all   Feeling down, depressed, or hopeless 2 - more than half the days 3 - nearly every day 2 - more than half the days 0 - not at all 0 - not at all   Trouble falling or staying asleep, or sleeping too much 2 - more than half the days 3 - nearly every day 2 - more than half the days     Feeling tired or having little energy 2 - more than half the days 2 - more than half the days 1 - several days     Poor appetite or overeating 1 - several days 0 - not at all 1 - several days     Feeling bad about yourself - or that you are a failure or have let yourself or your family down 0 - not at all 0 - not at all 0 - not at all     Trouble concentrating on things, such as reading the newspaper or watching television 1 - several days 3 - nearly every day 2 - more than half the days     Moving or speaking so slowly that other people could have noticed. Or the opposite - being so fidgety or restless that you have been moving around a lot more than usual 0 - not at all 0 - not at all 0 - not at all     Thoughts that you would be better off dead, or of hurting yourself in some way 0 - not at all 0 - not at all 0 - not at all     PHQ-2 Total Score 4 4 2 0 0   PHQ-9 Total Score 10 12 8             She is currently driving. No longer needs [employer] paperwork.    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    Past Medical History:   Past Medical History:   Diagnosis Date    Pain 5/27/2014    right shoulder    Pain 05-09-12    neck to left wrist, 4/10    Allergic rhinitis     Anesthesia     \"severe nausea\"    Angioma, venous     Asthma in adult     mild    Back pain     Breath shortness     Chickenpox     Cough     Daytime sleepiness     Epilepsy (HCC)     Heart burn     Hypothyroidism     Kidney stone " "    Nasal drainage     Painful joint     Rhinitis     Seizure (HCC)     \"simple, partial\", last seizure 01/2013    Shortness of breath     Sleep apnea     Sore muscles     Sore throat, chronic     Unspecified disorder of thyroid     hypothyroid    Wears glasses     Wheezing        Current Medications:   Current Outpatient Medications:     levothyroxine (SYNTHROID) 75 MCG Tab, , Disp: , Rfl:     budesonide-formoterol (SYMBICORT) 160-4.5 MCG/ACT Aerosol, Inhale 2 Puffs 2 times a day. Use spacer. Rinse mouth after each use., Disp: 10.2 g, Rfl: 11    LamoTRIgine 100 MG TABLET SR 24 HR, Take 2 Tablets by mouth every day., Disp: 180 Tablet, Rfl: 2    albuterol (PROVENTIL) 2.5mg/3ml Nebu Soln solution for nebulization, , Disp: , Rfl:     fluticasone (FLONASE) 50 MCG/ACT nasal spray, Administer 2 Sprays into affected nostril(S) every day., Disp: 1 g, Rfl: 1    Probiotic Product (PROBIOTIC PO), Take  by mouth every day., Disp: , Rfl:     Flaxseed, Linseed, (FLAXSEED OIL PO), Take  by mouth every bedtime. Unsure of dose , Disp: , Rfl:     Coenzyme Q10 (CO Q 10 PO), Take  by mouth every bedtime. Unsure of dose , Disp: , Rfl:     Multiple Vitamin (MULTIVITAMIN PO), Take  by mouth every bedtime., Disp: , Rfl:     Allergies: No Known Allergies      Physical Exam:   Ambulatory Vitals  Vitals:    03/14/25 0817   BP: 116/74   Pulse: 97   Resp: 16   Temp: 36.3 °C (97.3 °F)   SpO2: 98%         Constitutional: Well-developed, well-nourished, good hygiene. Appears stated age..   Skin: Warm, dry, intact. No rashes observed.  Neurologic:   Mental Status: Awake, alert, oriented x 3.   Speech: Fluent with normal prosody.   Memory: Able to recall recent and remote events accurately.    Concentration: Attentive. Able to focus on history and follow multi-step commands.   Fund of Knowledge: Appropriate.   Cranial Nerves:    CN II: PERRL     CN III, IV, VI: EOMI without nystagmus    CN VII: No facial asymmetry    CN VIII: Hearing intact to " voice   Motor: moving all 4 extremities fully and equally    Gait: ambulates steadily without assistive device   Movements: No resting tremors or abnormal movements observed.     Studies:      Imaging:     MRI brain wwo contrast, 3T epilepsy protocol 7/22/2020  IMPRESSION:     1.  Right frontal venous angioma/developmental venous anomaly again seen. No evidence of interval hemorrhage or hemosiderin deposition. No change from prior exam.  2.  Remainder of the study is unremarkable.    EEG Results:   Ambulatory EEG 1/9/2024  INTERPRETATION:  Normal ambulatory EEG recording in the awake and drowsy/sleep state(s):  -No regional slowing or persistent focal asymmetries were seen.  -No epileptiform discharges or other epileptiform phenomena seen.  -No seizures. Clinical correlation is recommended.  -Clinical Events: none    Routine EEG 9/8/2023  INTERPRETATION:   Abnormal video EEG recording in the awake, drowsy, and sleep state(s) due to:  Occasional right maximal bifrontal spike waves suggestive of a predisposition for seizures to arise from this region. No seizures or epileptiform discharges. Clinical correlation recommended.     Routine EEG 9/4/2015  INTERPRETATION:  This is a normal awake and asleep EEG.     COMMENTS:  A normal EEG, does not rule out the presence of epilepsy.  Clinical   correlation is necessary.         Assessment/Plan:   Mir Cisneros is a 54 y.o. with epilepsy, probable focal onset who is being seen in follow-up regarding seizures.  She has a history of events without loss of consciousness that involve an abnormal sensation of closing and followed by ~30 minutes of a dizzy sensation.  No events since 2018 until auras returned at a lower dose of Lamictal. .     She had a normal routine EEG in 2015, normal ambulatory EEG in 9/2023.  MRI epilepsy protocol in 7/2020 was notable for a right frontal developmental venous anomaly which has been thought to be incidental rather than epileptogenic.  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 Plan for repeat MRI of the brain wwo contrast to ensure stability of the DVA.     Plan to continue lowest effective dose of Lamictal XR which was 200 mg daily in the morning.     Mood has been better. Behavioral health appointment is pending, scheduled in the distant future.    Patient is taking Vitamin D for bone health. Level 61. DEXA scan due next year, 2026.     Labs to screen for medication side effects/toxicity were completed, LTG level pending.       Follow-up in 6 months, or sooner if needed.     Alexa English M.D.   Diplomate, Neurology with Special Qualification in Epilepsy, American Board of Psychiatry and Neurology   of Clinical Neurology, [employer] [facility] [employer]  Level III Epilepsy Center, Department of Neurology at [facility]

## 2025-06-23 ENCOUNTER — HOSPITAL ENCOUNTER (OUTPATIENT)
Dept: RADIOLOGY | Facility: MEDICAL CENTER | Age: 55
End: 2025-06-23
Attending: STUDENT IN AN ORGANIZED HEALTH CARE EDUCATION/TRAINING PROGRAM
Payer: COMMERCIAL

## 2025-06-23 VITALS
HEART RATE: 70 BPM | RESPIRATION RATE: 16 BRPM | DIASTOLIC BLOOD PRESSURE: 98 MMHG | SYSTOLIC BLOOD PRESSURE: 162 MMHG | OXYGEN SATURATION: 95 %

## 2025-06-23 DIAGNOSIS — G40.109 FOCAL EPILEPSY (HCC): ICD-10-CM

## 2025-06-23 DIAGNOSIS — Q28.3 DEVELOPMENTAL VENOUS ANOMALY, CEREBRAL: ICD-10-CM

## 2025-06-23 PROCEDURE — A9270 NON-COVERED ITEM OR SERVICE: HCPCS | Performed by: RADIOLOGY

## 2025-06-23 PROCEDURE — 700102 HCHG RX REV CODE 250 W/ 637 OVERRIDE(OP): Performed by: RADIOLOGY

## 2025-06-23 PROCEDURE — A9579 GAD-BASE MR CONTRAST NOS,1ML: HCPCS | Mod: JZ | Performed by: STUDENT IN AN ORGANIZED HEALTH CARE EDUCATION/TRAINING PROGRAM

## 2025-06-23 PROCEDURE — 70553 MRI BRAIN STEM W/O & W/DYE: CPT

## 2025-06-23 PROCEDURE — 700117 HCHG RX CONTRAST REV CODE 255: Mod: JZ | Performed by: STUDENT IN AN ORGANIZED HEALTH CARE EDUCATION/TRAINING PROGRAM

## 2025-06-23 RX ORDER — DIAZEPAM 5 MG/1
5 TABLET ORAL
Status: COMPLETED | OUTPATIENT
Start: 2025-06-23 | End: 2025-06-23

## 2025-06-23 RX ORDER — GADOTERIDOL 279.3 MG/ML
20 INJECTION INTRAVENOUS ONCE
Status: COMPLETED | OUTPATIENT
Start: 2025-06-23 | End: 2025-06-23

## 2025-06-23 RX ADMIN — DIAZEPAM 5 MG: 5 TABLET ORAL at 12:32

## 2025-06-23 RX ADMIN — GADOTERIDOL 20 ML: 279.3 INJECTION, SOLUTION INTRAVENOUS at 13:34

## 2025-06-23 NOTE — DISCHARGE INSTRUCTIONS
MRI ADULT DISCHARGE INSTRUCTIONS    You have been medicated today for your scan. Please follow the instructions below to ensure your safe recovery. If you have any questions or problems, feel free to call us at [phone] or [phone].     1.   Have someone stay with you to assist you as needed.    2.   Do not drive or operate any mechanical devices.    3.   Do not perform any activity that requires concentration. Make no major decisions over the next 24 hours.     4.   Be careful changing positions from laying down to sitting or standing, as you may become dizzy.     5.   Do not drink alcohol for 48 hours.    6.   There are no restrictions for eating your normal meals. Drink fluids.    7.   You may continue your usual medications for pain, tranquilizers, muscle relaxants or sedatives when awake.     8.   Tomorrow, you may continue your normal daily activities.     9.   Pressure dressing on 10 - 15 minutes. If swelling or bleeding occurs when removed, continue placing direct pressure on injection site for another 5 minutes, or until bleeding stops.     I have been informed of and understand the above discharge instructions.     Diazepam (VALIUM) Oral solution 5 mg @1235 6/23/25  What is this medicine?  You were prescribed DIAZEPAM (dye AZ e nieves) for the procedure you had today. This medication is a benzodiazepine. It is used to treat anxiety and nervousness. It also can help treat alcohol withdrawal, relax muscles, and treat certain types of seizures.  This medicine may be used for other purposes; ask your health care provider or pharmacist if you have questions.  What side effects may I notice from receiving this medicine?  Side effects that you should report to your doctor or health care professional as soon as possible:  allergic reactions like skin rash, itching or hives, swelling of the face, lips, or tongue  angry, confused, depressed, other mood changes  breathing problems  feeling faint or lightheaded,  falls  muscle cramps  problems with balance, talking, walking  restlessness  tremors  trouble passing urine or change in the amount of urine  unusually weak or tired  Side effects that usually do not require medical attention (report to your doctor or health care professional if they continue or are bothersome):  difficulty sleeping, nightmares  dizziness, drowsiness, clumsiness, or unsteadiness, a hangover effect  headache  nausea, vomiting  This list may not describe all possible side effects. Call your doctor for medical advice about side effects. You may report side effects to FDA at [phone]-FDA-[phone].

## 2025-06-23 NOTE — PROGRESS NOTES
MRI NURSING NOTE:    Mir arrives ambulatory to [facility]. Pt A/Ox4 with responsible adult present. 5 mg Valium administered per order. Discharge instructions discussed and they verbalized understanding. Scan completed and pt discharged with her .

## 2025-06-25 ENCOUNTER — RESULTS FOLLOW-UP (OUTPATIENT)
Dept: NEUROLOGY | Facility: MEDICAL CENTER | Age: 55
End: 2025-06-25

## 2025-07-03 ENCOUNTER — APPOINTMENT (OUTPATIENT)
Dept: BEHAVIORAL HEALTH | Facility: CLINIC | Age: 55
End: 2025-07-03
Payer: COMMERCIAL

## 2025-07-09 DIAGNOSIS — Z87.898 HISTORY OF SEIZURE: ICD-10-CM

## 2025-07-11 RX ORDER — LAMOTRIGINE 100 MG/1
2 TABLET, EXTENDED RELEASE ORAL
Qty: 180 TABLET | Refills: 2 | Status: SHIPPED | OUTPATIENT
Start: 2025-07-11

## 2025-07-11 NOTE — TELEPHONE ENCOUNTER
Received request via: Pharmacy    Medication Name/Dosage LamoTRIgine 100 MG TABLET SR 24 HR     When was medication last prescribed 11/11/2024    How many refills were previously provided 2    How many Refills does he patient have left from last prescription 0    Was the patient seen in the last year in this department? Yes   Date of last office visit 03/14/2025     Per last Neurology Office Visit, when was the date of next follow up visit set for?                            Date of office visit follow up request 6 mo     Does the patient have an upcoming appointment? Yes   If yes, when 09/17/2025             If no, schedule appointment done    Does the patient have USP Plus and need 100 day supply (blood pressure, diabetes and cholesterol meds only)? Patient does not have SCP

## 2025-07-21 ENCOUNTER — APPOINTMENT (OUTPATIENT)
Dept: URBAN - METROPOLITAN AREA CLINIC 22 | Facility: CLINIC | Age: 55
Setting detail: DERMATOLOGY
End: 2025-07-21

## 2025-07-21 DIAGNOSIS — Z71.89 OTHER SPECIFIED COUNSELING: ICD-10-CM

## 2025-07-21 DIAGNOSIS — L70.0 ACNE VULGARIS: ICD-10-CM

## 2025-07-21 DIAGNOSIS — L81.4 OTHER MELANIN HYPERPIGMENTATION: ICD-10-CM

## 2025-07-21 DIAGNOSIS — I83.9 ASYMPTOMATIC VARICOSE VEINS OF LOWER EXTREMITIES: ICD-10-CM

## 2025-07-21 PROBLEM — I83.90 ASYMPTOMATIC VARICOSE VEINS OF UNSPECIFIED LOWER EXTREMITY: Status: ACTIVE | Noted: 2025-07-21

## 2025-07-21 PROCEDURE — ? COUNSELING

## 2025-07-21 PROCEDURE — ? PRESCRIPTION

## 2025-07-21 PROCEDURE — ? TREATMENT REGIMEN

## 2025-07-21 PROCEDURE — ? ADDITIONAL NOTES

## 2025-07-21 RX ORDER — TRIFAROTENE 50 UG/G
THIN LAYER CREAM TOPICAL QHS
Qty: 45 | Refills: 3 | Status: ERX | COMMUNITY
Start: 2025-07-21

## 2025-07-21 RX ADMIN — TRIFAROTENE THIN LAYER: 50 CREAM TOPICAL at 00:00

## 2025-07-21 ASSESSMENT — LOCATION DETAILED DESCRIPTION DERM
LOCATION DETAILED: LEFT SUPERIOR LATERAL MALAR CHEEK
LOCATION DETAILED: RIGHT MEDIAL MALAR CHEEK
LOCATION DETAILED: LEFT CENTRAL MALAR CHEEK
LOCATION DETAILED: RIGHT SUPERIOR LATERAL MALAR CHEEK

## 2025-07-21 ASSESSMENT — LOCATION SIMPLE DESCRIPTION DERM
LOCATION SIMPLE: RIGHT CHEEK
LOCATION SIMPLE: LEFT CHEEK

## 2025-07-21 ASSESSMENT — LOCATION ZONE DERM: LOCATION ZONE: FACE

## 2025-07-21 NOTE — PROCEDURE: COUNSELING
Detail Level: Zone
Prescription Strength Graduated Compression Stockings Recommendations: The patient was counseled that prescription strength graduated compression stockings should be worn for all waking hours. They will follow up with a venous specialist to monitor graduated compression stocking usage and their symptoms.
Azelaic Acid Pregnancy And Lactation Text: This medication is considered safe during pregnancy and breast feeding.
Topical Clindamycin Counseling: Patient counseled that this medication may cause skin irritation or allergic reactions.  In the event of skin irritation, the patient was advised to reduce the amount of the drug applied or use it less frequently.   The patient verbalized understanding of the proper use and possible adverse effects of clindamycin.  All of the patient's questions and concerns were addressed.
Birth Control Pills Counseling: Birth Control Pill Counseling: I discussed with the patient the potential side effects of OCPs including but not limited to increased risk of stroke, heart attack, thrombophlebitis, deep venous thrombosis, hepatic adenomas, breast changes, GI upset, headaches, and depression.  The patient verbalized understanding of the proper use and possible adverse effects of OCPs. All of the patient's questions and concerns were addressed.
Erythromycin Pregnancy And Lactation Text: This medication is Pregnancy Category B and is considered safe during pregnancy. It is also excreted in breast milk.
Sarecycline Counseling: Patient advised regarding possible photosensitivity and discoloration of the teeth, skin, lips, tongue and gums.  Patient instructed to avoid sunlight, if possible.  When exposed to sunlight, patients should wear protective clothing, sunglasses, and sunscreen.  The patient was instructed to call the office immediately if the following severe adverse effects occur:  hearing changes, easy bruising/bleeding, severe headache, or vision changes.  The patient verbalized understanding of the proper use and possible adverse effects of sarecycline.  All of the patient's questions and concerns were addressed.
Aklief Pregnancy And Lactation Text: It is unknown if this medication is safe to use during pregnancy.  It is unknown if this medication is excreted in breast milk.  Breastfeeding women should use the topical cream on the smallest area of the skin for the shortest time needed while breastfeeding.  Do not apply to nipple and areola.
Bactrim Counseling:  I discussed with the patient the risks of sulfa antibiotics including but not limited to GI upset, allergic reaction, drug rash, diarrhea, dizziness, photosensitivity, and yeast infections.  Rarely, more serious reactions can occur including but not limited to aplastic anemia, agranulocytosis, methemoglobinemia, blood dyscrasias, liver or kidney failure, lung infiltrates or desquamative/blistering drug rashes.
Use Enhanced Medication Counseling?: No
Doxycycline Pregnancy And Lactation Text: This medication is Pregnancy Category D and not consider safe during pregnancy. It is also excreted in breast milk but is considered safe for shorter treatment courses.
Tazorac Counseling:  Patient advised that medication is irritating and drying.  Patient may need to apply sparingly and wash off after an hour before eventually leaving it on overnight.  The patient verbalized understanding of the proper use and possible adverse effects of tazorac.  All of the patient's questions and concerns were addressed.
Winlevi Pregnancy And Lactation Text: This medication is considered safe during pregnancy and breastfeeding.
Minocycline Counseling: Patient advised regarding possible photosensitivity and discoloration of the teeth, skin, lips, tongue and gums.  Patient instructed to avoid sunlight, if possible.  When exposed to sunlight, patients should wear protective clothing, sunglasses, and sunscreen.  The patient was instructed to call the office immediately if the following severe adverse effects occur:  hearing changes, easy bruising/bleeding, severe headache, or vision changes.  The patient verbalized understanding of the proper use and possible adverse effects of minocycline.  All of the patient's questions and concerns were addressed.
Topical Retinoid counseling:  Patient advised to apply a pea-sized amount only at bedtime and wait 30 minutes after washing their face before applying.  If too drying, patient may add a non-comedogenic moisturizer. The patient verbalized understanding of the proper use and possible adverse effects of retinoids.  All of the patient's questions and concerns were addressed.
Tetracycline Pregnancy And Lactation Text: This medication is Pregnancy Category D and not consider safe during pregnancy. It is also excreted in breast milk.
High Dose Vitamin A Counseling: Side effects reviewed, pt to contact office should one occur.
Topical Sulfur Applications Pregnancy And Lactation Text: This medication is Pregnancy Category C and has an unknown safety profile during pregnancy. It is unknown if this topical medication is excreted in breast milk.
Dapsone Pregnancy And Lactation Text: This medication is Pregnancy Category C and is not considered safe during pregnancy or breast feeding.
Azithromycin Counseling:  I discussed with the patient the risks of azithromycin including but not limited to GI upset, allergic reaction, drug rash, diarrhea, and yeast infections.
Topical Clindamycin Pregnancy And Lactation Text: This medication is Pregnancy Category B and is considered safe during pregnancy. It is unknown if it is excreted in breast milk.
Benzoyl Peroxide Counseling: Patient counseled that medicine may cause skin irritation and bleach clothing.  In the event of skin irritation, the patient was advised to reduce the amount of the drug applied or use it less frequently.   The patient verbalized understanding of the proper use and possible adverse effects of benzoyl peroxide.  All of the patient's questions and concerns were addressed.
Spironolactone Pregnancy And Lactation Text: This medication can cause feminization of the male fetus and should be avoided during pregnancy. The active metabolite is also found in breast milk.
Birth Control Pills Pregnancy And Lactation Text: This medication should be avoided if pregnant and for the first 30 days post-partum.
Isotretinoin Counseling: Patient should get monthly blood tests, not donate blood, not drive at night if vision affected, not share medication, and not undergo elective surgery for 6 months after tx completed. Side effects reviewed, pt to contact office should one occur.
Tazorac Pregnancy And Lactation Text: This medication is not safe during pregnancy. It is unknown if this medication is excreted in breast milk.
Azelaic Acid Counseling: Patient counseled that medicine may cause skin irritation and to avoid applying near the eyes.  In the event of skin irritation, the patient was advised to reduce the amount of the drug applied or use it less frequently.   The patient verbalized understanding of the proper use and possible adverse effects of azelaic acid.  All of the patient's questions and concerns were addressed.
Bactrim Pregnancy And Lactation Text: This medication is Pregnancy Category D and is known to cause fetal risk.  It is also excreted in breast milk.
Include Pregnancy/Lactation Warning?: Add Automatically Based on Childbearing Potential and Patient Age
Azithromycin Pregnancy And Lactation Text: This medication is considered safe during pregnancy and is also secreted in breast milk.
Erythromycin Counseling:  I discussed with the patient the risks of erythromycin including but not limited to GI upset, allergic reaction, drug rash, diarrhea, increase in liver enzymes, and yeast infections.
Aklief counseling:  Patient advised to apply a pea-sized amount only at bedtime and wait 30 minutes after washing their face before applying.  If too drying, patient may add a non-comedogenic moisturizer.  The most commonly reported side effects including irritation, redness, scaling, dryness, stinging, burning, itching, and increased risk of sunburn.  The patient verbalized understanding of the proper use and possible adverse effects of retinoids.  All of the patient's questions and concerns were addressed.
Topical Retinoid Pregnancy And Lactation Text: This medication is Pregnancy Category C. It is unknown if this medication is excreted in breast milk.
Winlevi Counseling:  I discussed with the patient the risks of topical clascoterone including but not limited to erythema, scaling, itching, and stinging. Patient voiced their understanding.
Doxycycline Counseling:  Patient counseled regarding possible photosensitivity and increased risk for sunburn.  Patient instructed to avoid sunlight, if possible.  When exposed to sunlight, patients should wear protective clothing, sunglasses, and sunscreen.  The patient was instructed to call the office immediately if the following severe adverse effects occur:  hearing changes, easy bruising/bleeding, severe headache, or vision changes.  The patient verbalized understanding of the proper use and possible adverse effects of doxycycline.  All of the patient's questions and concerns were addressed.
Benzoyl Peroxide Pregnancy And Lactation Text: This medication is Pregnancy Category C. It is unknown if benzoyl peroxide is excreted in breast milk.
Tetracycline Counseling: Patient counseled regarding possible photosensitivity and increased risk for sunburn.  Patient instructed to avoid sunlight, if possible.  When exposed to sunlight, patients should wear protective clothing, sunglasses, and sunscreen.  The patient was instructed to call the office immediately if the following severe adverse effects occur:  hearing changes, easy bruising/bleeding, severe headache, or vision changes.  The patient verbalized understanding of the proper use and possible adverse effects of tetracycline.  All of the patient's questions and concerns were addressed. Patient understands to avoid pregnancy while on therapy due to potential birth defects.
High Dose Vitamin A Pregnancy And Lactation Text: High dose vitamin A therapy is contraindicated during pregnancy and breast feeding.
Dapsone Counseling: I discussed with the patient the risks of dapsone including but not limited to hemolytic anemia, agranulocytosis, rashes, methemoglobinemia, kidney failure, peripheral neuropathy, headaches, GI upset, and liver toxicity.  Patients who start dapsone require monitoring including baseline LFTs and weekly CBCs for the first month, then every month thereafter.  The patient verbalized understanding of the proper use and possible adverse effects of dapsone.  All of the patient's questions and concerns were addressed.
Topical Sulfur Applications Counseling: Topical Sulfur Counseling: Patient counseled that this medication may cause skin irritation or allergic reactions.  In the event of skin irritation, the patient was advised to reduce the amount of the drug applied or use it less frequently.   The patient verbalized understanding of the proper use and possible adverse effects of topical sulfur application.  All of the patient's questions and concerns were addressed.
Isotretinoin Pregnancy And Lactation Text: This medication is Pregnancy Category X and is considered extremely dangerous during pregnancy. It is unknown if it is excreted in breast milk.
Spironolactone Counseling: Patient advised regarding risks of diarrhea, abdominal pain, hyperkalemia, birth defects (for female patients), liver toxicity and renal toxicity. The patient may need blood work to monitor liver and kidney function and potassium levels while on therapy. The patient verbalized understanding of the proper use and possible adverse effects of spironolactone.  All of the patient's questions and concerns were addressed.
Detail Level: Generalized

## 2025-09-03 ENCOUNTER — APPOINTMENT (OUTPATIENT)
Dept: LAB | Facility: MEDICAL CENTER | Age: 55
End: 2025-09-03
Payer: COMMERCIAL